# Patient Record
Sex: MALE | Race: WHITE | NOT HISPANIC OR LATINO
[De-identification: names, ages, dates, MRNs, and addresses within clinical notes are randomized per-mention and may not be internally consistent; named-entity substitution may affect disease eponyms.]

---

## 2017-01-03 ENCOUNTER — TRANSCRIPTION ENCOUNTER (OUTPATIENT)
Age: 62
End: 2017-01-03

## 2017-01-06 ENCOUNTER — TRANSCRIPTION ENCOUNTER (OUTPATIENT)
Age: 62
End: 2017-01-06

## 2017-01-11 ENCOUNTER — MEDICATION RENEWAL (OUTPATIENT)
Age: 62
End: 2017-01-11

## 2017-01-19 ENCOUNTER — APPOINTMENT (OUTPATIENT)
Dept: PULMONOLOGY | Facility: CLINIC | Age: 62
End: 2017-01-19

## 2017-01-19 VITALS — HEART RATE: 91 BPM | RESPIRATION RATE: 14 BRPM | OXYGEN SATURATION: 97 %

## 2017-02-28 ENCOUNTER — APPOINTMENT (OUTPATIENT)
Dept: INTERNAL MEDICINE | Facility: CLINIC | Age: 62
End: 2017-02-28

## 2017-02-28 VITALS
HEART RATE: 74 BPM | WEIGHT: 315 LBS | TEMPERATURE: 98.2 F | BODY MASS INDEX: 56.33 KG/M2 | SYSTOLIC BLOOD PRESSURE: 110 MMHG | DIASTOLIC BLOOD PRESSURE: 70 MMHG | OXYGEN SATURATION: 98 %

## 2017-03-13 ENCOUNTER — TRANSCRIPTION ENCOUNTER (OUTPATIENT)
Age: 62
End: 2017-03-13

## 2017-03-30 ENCOUNTER — APPOINTMENT (OUTPATIENT)
Dept: OPHTHALMOLOGY | Facility: CLINIC | Age: 62
End: 2017-03-30

## 2017-04-05 ENCOUNTER — TRANSCRIPTION ENCOUNTER (OUTPATIENT)
Age: 62
End: 2017-04-05

## 2017-04-05 ENCOUNTER — RX RENEWAL (OUTPATIENT)
Age: 62
End: 2017-04-05

## 2017-04-06 ENCOUNTER — OTHER (OUTPATIENT)
Age: 62
End: 2017-04-06

## 2017-04-06 ENCOUNTER — TRANSCRIPTION ENCOUNTER (OUTPATIENT)
Age: 62
End: 2017-04-06

## 2017-04-10 ENCOUNTER — OTHER (OUTPATIENT)
Age: 62
End: 2017-04-10

## 2017-04-10 ENCOUNTER — TRANSCRIPTION ENCOUNTER (OUTPATIENT)
Age: 62
End: 2017-04-10

## 2017-04-19 ENCOUNTER — APPOINTMENT (OUTPATIENT)
Dept: PULMONOLOGY | Facility: CLINIC | Age: 62
End: 2017-04-19

## 2017-04-20 ENCOUNTER — APPOINTMENT (OUTPATIENT)
Dept: PULMONOLOGY | Facility: CLINIC | Age: 62
End: 2017-04-20
Payer: COMMERCIAL

## 2017-04-20 VITALS — OXYGEN SATURATION: 96 %

## 2017-04-20 PROCEDURE — ZZZZZ: CPT

## 2017-04-24 ENCOUNTER — OTHER (OUTPATIENT)
Age: 62
End: 2017-04-24

## 2017-05-02 ENCOUNTER — TRANSCRIPTION ENCOUNTER (OUTPATIENT)
Age: 62
End: 2017-05-02

## 2017-05-30 ENCOUNTER — RX RENEWAL (OUTPATIENT)
Age: 62
End: 2017-05-30

## 2017-06-08 ENCOUNTER — TRANSCRIPTION ENCOUNTER (OUTPATIENT)
Age: 62
End: 2017-06-08

## 2017-06-08 ENCOUNTER — MEDICATION RENEWAL (OUTPATIENT)
Age: 62
End: 2017-06-08

## 2017-06-12 ENCOUNTER — RX RENEWAL (OUTPATIENT)
Age: 62
End: 2017-06-12

## 2017-06-20 ENCOUNTER — APPOINTMENT (OUTPATIENT)
Dept: INTERNAL MEDICINE | Facility: CLINIC | Age: 62
End: 2017-06-20

## 2017-06-20 VITALS
HEART RATE: 82 BPM | SYSTOLIC BLOOD PRESSURE: 130 MMHG | OXYGEN SATURATION: 97 % | BODY MASS INDEX: 47.74 KG/M2 | WEIGHT: 315 LBS | DIASTOLIC BLOOD PRESSURE: 70 MMHG | HEIGHT: 68 IN | TEMPERATURE: 98 F

## 2017-06-23 LAB
ALBUMIN SERPL ELPH-MCNC: 4.1 G/DL
ALP BLD-CCNC: 67 U/L
ALT SERPL-CCNC: 12 U/L
ANION GAP SERPL CALC-SCNC: 16 MMOL/L
AST SERPL-CCNC: 19 U/L
BILIRUB SERPL-MCNC: 0.2 MG/DL
BUN SERPL-MCNC: 20 MG/DL
CALCIUM SERPL-MCNC: 10 MG/DL
CHLORIDE SERPL-SCNC: 104 MMOL/L
CHOLEST SERPL-MCNC: 164 MG/DL
CHOLEST/HDLC SERPL: 3.2 RATIO
CO2 SERPL-SCNC: 23 MMOL/L
CREAT SERPL-MCNC: 1.29 MG/DL
CREAT SPEC-SCNC: 146 MG/DL
GLUCOSE SERPL-MCNC: 114 MG/DL
HBA1C MFR BLD HPLC: 6.3 %
HDLC SERPL-MCNC: 52 MG/DL
LDLC SERPL CALC-MCNC: 78 MG/DL
MICROALBUMIN 24H UR DL<=1MG/L-MCNC: 1.7 MG/DL
MICROALBUMIN/CREAT 24H UR-RTO: 12 MG/G
POTASSIUM SERPL-SCNC: 4.5 MMOL/L
PROT SERPL-MCNC: 6.6 G/DL
SODIUM SERPL-SCNC: 143 MMOL/L
TRIGL SERPL-MCNC: 168 MG/DL

## 2017-06-26 ENCOUNTER — TRANSCRIPTION ENCOUNTER (OUTPATIENT)
Age: 62
End: 2017-06-26

## 2017-06-26 ENCOUNTER — MEDICATION RENEWAL (OUTPATIENT)
Age: 62
End: 2017-06-26

## 2017-07-19 ENCOUNTER — APPOINTMENT (OUTPATIENT)
Dept: INTERNAL MEDICINE | Facility: CLINIC | Age: 62
End: 2017-07-19

## 2017-07-19 VITALS
HEART RATE: 97 BPM | HEIGHT: 67 IN | SYSTOLIC BLOOD PRESSURE: 122 MMHG | DIASTOLIC BLOOD PRESSURE: 84 MMHG | TEMPERATURE: 98.2 F | BODY MASS INDEX: 49.44 KG/M2 | OXYGEN SATURATION: 96 % | WEIGHT: 315 LBS

## 2017-07-20 LAB
25(OH)D3 SERPL-MCNC: 41 NG/ML
BASOPHILS # BLD AUTO: 0.06 K/UL
BASOPHILS NFR BLD AUTO: 0.9 %
EOSINOPHIL # BLD AUTO: 0.44 K/UL
EOSINOPHIL NFR BLD AUTO: 6.6 %
HCT VFR BLD CALC: 42.5 %
HGB BLD-MCNC: 13.3 G/DL
HIV1+2 AB SPEC QL IA.RAPID: NONREACTIVE
IMM GRANULOCYTES NFR BLD AUTO: 0.5 %
LYMPHOCYTES # BLD AUTO: 1.03 K/UL
LYMPHOCYTES NFR BLD AUTO: 15.6 %
MAN DIFF?: NORMAL
MCHC RBC-ENTMCNC: 28.2 PG
MCHC RBC-ENTMCNC: 31.3 GM/DL
MCV RBC AUTO: 90.2 FL
MONOCYTES # BLD AUTO: 0.72 K/UL
MONOCYTES NFR BLD AUTO: 10.9 %
NEUTROPHILS # BLD AUTO: 4.34 K/UL
NEUTROPHILS NFR BLD AUTO: 65.5 %
PLATELET # BLD AUTO: 282 K/UL
RBC # BLD: 4.71 M/UL
RBC # FLD: 15.2 %
WBC # FLD AUTO: 6.62 K/UL

## 2017-08-10 ENCOUNTER — INPATIENT (INPATIENT)
Facility: HOSPITAL | Age: 62
LOS: 4 days | Discharge: HOME | DRG: 394 | End: 2017-08-15
Attending: SURGERY | Admitting: SURGERY
Payer: COMMERCIAL

## 2017-08-10 VITALS
WEIGHT: 315 LBS | SYSTOLIC BLOOD PRESSURE: 153 MMHG | RESPIRATION RATE: 18 BRPM | TEMPERATURE: 98 F | OXYGEN SATURATION: 96 % | HEART RATE: 119 BPM | HEIGHT: 68 IN | DIASTOLIC BLOOD PRESSURE: 73 MMHG

## 2017-08-10 DIAGNOSIS — Z90.49 ACQUIRED ABSENCE OF OTHER SPECIFIED PARTS OF DIGESTIVE TRACT: Chronic | ICD-10-CM

## 2017-08-10 LAB
ALBUMIN SERPL ELPH-MCNC: 3.4 G/DL — SIGNIFICANT CHANGE UP (ref 3.3–5)
ALP SERPL-CCNC: 69 U/L — SIGNIFICANT CHANGE UP (ref 40–120)
ALT FLD-CCNC: 20 U/L — SIGNIFICANT CHANGE UP (ref 10–45)
ANION GAP SERPL CALC-SCNC: 23 MMOL/L — HIGH (ref 5–17)
APPEARANCE UR: CLEAR — SIGNIFICANT CHANGE UP
AST SERPL-CCNC: 32 U/L — SIGNIFICANT CHANGE UP (ref 10–40)
BASOPHILS NFR BLD AUTO: 2 % — SIGNIFICANT CHANGE UP (ref 0–2)
BILIRUB SERPL-MCNC: 0.6 MG/DL — SIGNIFICANT CHANGE UP (ref 0.2–1.2)
BILIRUB UR-MCNC: (no result)
BUN SERPL-MCNC: 27 MG/DL — HIGH (ref 7–23)
CALCIUM SERPL-MCNC: 8.8 MG/DL — SIGNIFICANT CHANGE UP (ref 8.4–10.5)
CHLORIDE SERPL-SCNC: 89 MMOL/L — LOW (ref 96–108)
CO2 SERPL-SCNC: 22 MMOL/L — SIGNIFICANT CHANGE UP (ref 22–31)
COLOR SPEC: YELLOW — SIGNIFICANT CHANGE UP
CREAT SERPL-MCNC: 1.2 MG/DL — SIGNIFICANT CHANGE UP (ref 0.5–1.3)
DIFF PNL FLD: NEGATIVE — SIGNIFICANT CHANGE UP
EXTRA SST TUBE: SIGNIFICANT CHANGE UP
GLUCOSE SERPL-MCNC: 165 MG/DL — HIGH (ref 70–99)
GLUCOSE UR QL: NEGATIVE — SIGNIFICANT CHANGE UP
HCT VFR BLD CALC: 46.9 % — SIGNIFICANT CHANGE UP (ref 39–50)
HGB BLD-MCNC: 15.7 G/DL — SIGNIFICANT CHANGE UP (ref 13–17)
INR BLD: 1.28 — HIGH (ref 0.88–1.16)
KETONES UR-MCNC: 15 MG/DL
LACTATE SERPL-SCNC: 1.5 MMOL/L — SIGNIFICANT CHANGE UP (ref 0.5–2)
LACTATE SERPL-SCNC: 2.2 MMOL/L — HIGH (ref 0.5–2)
LEUKOCYTE ESTERASE UR-ACNC: NEGATIVE — SIGNIFICANT CHANGE UP
LYMPHOCYTES # BLD AUTO: 11 % — LOW (ref 13–44)
MCHC RBC-ENTMCNC: 28.1 PG — SIGNIFICANT CHANGE UP (ref 27–34)
MCHC RBC-ENTMCNC: 33.5 G/DL — SIGNIFICANT CHANGE UP (ref 32–36)
MCV RBC AUTO: 84.1 FL — SIGNIFICANT CHANGE UP (ref 80–100)
MONOCYTES NFR BLD AUTO: 24 % — HIGH (ref 2–14)
NEUTROPHILS NFR BLD AUTO: 50 % — SIGNIFICANT CHANGE UP (ref 43–77)
NITRITE UR-MCNC: NEGATIVE — SIGNIFICANT CHANGE UP
PH UR: 5 — SIGNIFICANT CHANGE UP (ref 5–8)
PLATELET # BLD AUTO: 357 K/UL — SIGNIFICANT CHANGE UP (ref 150–400)
POTASSIUM SERPL-MCNC: 4.2 MMOL/L — SIGNIFICANT CHANGE UP (ref 3.5–5.3)
POTASSIUM SERPL-SCNC: 4.2 MMOL/L — SIGNIFICANT CHANGE UP (ref 3.5–5.3)
PROT SERPL-MCNC: 7.2 G/DL — SIGNIFICANT CHANGE UP (ref 6–8.3)
PROT UR-MCNC: (no result) MG/DL
PROTHROM AB SERPL-ACNC: 14.3 SEC — HIGH (ref 9.8–12.7)
RBC # BLD: 5.58 M/UL — SIGNIFICANT CHANGE UP (ref 4.2–5.8)
RBC # FLD: 14.1 % — SIGNIFICANT CHANGE UP (ref 10.3–16.9)
SODIUM SERPL-SCNC: 134 MMOL/L — LOW (ref 135–145)
SP GR SPEC: 1.02 — SIGNIFICANT CHANGE UP (ref 1–1.03)
UROBILINOGEN FLD QL: 0.2 E.U./DL — SIGNIFICANT CHANGE UP
WBC # BLD: 8.2 K/UL — SIGNIFICANT CHANGE UP (ref 3.8–10.5)
WBC # FLD AUTO: 8.2 K/UL — SIGNIFICANT CHANGE UP (ref 3.8–10.5)

## 2017-08-10 PROCEDURE — 93010 ELECTROCARDIOGRAM REPORT: CPT

## 2017-08-10 PROCEDURE — 71010: CPT | Mod: 26

## 2017-08-10 PROCEDURE — 74177 CT ABD & PELVIS W/CONTRAST: CPT | Mod: 26

## 2017-08-10 PROCEDURE — 99285 EMERGENCY DEPT VISIT HI MDM: CPT | Mod: 25

## 2017-08-10 RX ORDER — MORPHINE SULFATE 50 MG/1
4 CAPSULE, EXTENDED RELEASE ORAL ONCE
Qty: 0 | Refills: 0 | Status: DISCONTINUED | OUTPATIENT
Start: 2017-08-10 | End: 2017-08-10

## 2017-08-10 RX ORDER — DEXTROSE 50 % IN WATER 50 %
25 SYRINGE (ML) INTRAVENOUS ONCE
Qty: 0 | Refills: 0 | Status: DISCONTINUED | OUTPATIENT
Start: 2017-08-10 | End: 2017-08-12

## 2017-08-10 RX ORDER — SODIUM CHLORIDE 9 MG/ML
1000 INJECTION INTRAMUSCULAR; INTRAVENOUS; SUBCUTANEOUS ONCE
Qty: 0 | Refills: 0 | Status: COMPLETED | OUTPATIENT
Start: 2017-08-10 | End: 2017-08-10

## 2017-08-10 RX ORDER — HEPARIN SODIUM 5000 [USP'U]/ML
7500 INJECTION INTRAVENOUS; SUBCUTANEOUS EVERY 8 HOURS
Qty: 0 | Refills: 0 | Status: DISCONTINUED | OUTPATIENT
Start: 2017-08-10 | End: 2017-08-15

## 2017-08-10 RX ORDER — CIPROFLOXACIN LACTATE 400MG/40ML
400 VIAL (ML) INTRAVENOUS ONCE
Qty: 0 | Refills: 0 | Status: COMPLETED | OUTPATIENT
Start: 2017-08-10 | End: 2017-08-10

## 2017-08-10 RX ORDER — DEXTROSE 50 % IN WATER 50 %
25 SYRINGE (ML) INTRAVENOUS ONCE
Qty: 0 | Refills: 0 | Status: DISCONTINUED | OUTPATIENT
Start: 2017-08-10 | End: 2017-08-15

## 2017-08-10 RX ORDER — ONDANSETRON 8 MG/1
4 TABLET, FILM COATED ORAL ONCE
Qty: 0 | Refills: 0 | Status: COMPLETED | OUTPATIENT
Start: 2017-08-10 | End: 2017-08-10

## 2017-08-10 RX ORDER — INSULIN LISPRO 100/ML
VIAL (ML) SUBCUTANEOUS EVERY 6 HOURS
Qty: 0 | Refills: 0 | Status: DISCONTINUED | OUTPATIENT
Start: 2017-08-10 | End: 2017-08-15

## 2017-08-10 RX ORDER — DEXTROSE 50 % IN WATER 50 %
12.5 SYRINGE (ML) INTRAVENOUS ONCE
Qty: 0 | Refills: 0 | Status: DISCONTINUED | OUTPATIENT
Start: 2017-08-10 | End: 2017-08-12

## 2017-08-10 RX ORDER — METRONIDAZOLE 500 MG
500 TABLET ORAL ONCE
Qty: 0 | Refills: 0 | Status: COMPLETED | OUTPATIENT
Start: 2017-08-10 | End: 2017-08-10

## 2017-08-10 RX ORDER — FAMOTIDINE 10 MG/ML
20 INJECTION INTRAVENOUS ONCE
Qty: 0 | Refills: 0 | Status: COMPLETED | OUTPATIENT
Start: 2017-08-10 | End: 2017-08-10

## 2017-08-10 RX ORDER — SODIUM CHLORIDE 9 MG/ML
1000 INJECTION INTRAMUSCULAR; INTRAVENOUS; SUBCUTANEOUS
Qty: 0 | Refills: 0 | Status: DISCONTINUED | OUTPATIENT
Start: 2017-08-10 | End: 2017-08-11

## 2017-08-10 RX ORDER — IOHEXOL 300 MG/ML
50 INJECTION, SOLUTION INTRAVENOUS ONCE
Qty: 0 | Refills: 0 | Status: COMPLETED | OUTPATIENT
Start: 2017-08-10 | End: 2017-08-10

## 2017-08-10 RX ADMIN — MORPHINE SULFATE 4 MILLIGRAM(S): 50 CAPSULE, EXTENDED RELEASE ORAL at 19:41

## 2017-08-10 RX ADMIN — IOHEXOL 50 MILLILITER(S): 300 INJECTION, SOLUTION INTRAVENOUS at 17:24

## 2017-08-10 RX ADMIN — Medication 200 MILLIGRAM(S): at 18:45

## 2017-08-10 RX ADMIN — MORPHINE SULFATE 4 MILLIGRAM(S): 50 CAPSULE, EXTENDED RELEASE ORAL at 20:31

## 2017-08-10 RX ADMIN — MORPHINE SULFATE 4 MILLIGRAM(S): 50 CAPSULE, EXTENDED RELEASE ORAL at 20:29

## 2017-08-10 RX ADMIN — SODIUM CHLORIDE 1000 MILLILITER(S): 9 INJECTION INTRAMUSCULAR; INTRAVENOUS; SUBCUTANEOUS at 17:46

## 2017-08-10 RX ADMIN — ONDANSETRON 4 MILLIGRAM(S): 8 TABLET, FILM COATED ORAL at 17:24

## 2017-08-10 RX ADMIN — FAMOTIDINE 20 MILLIGRAM(S): 10 INJECTION INTRAVENOUS at 17:24

## 2017-08-10 RX ADMIN — MORPHINE SULFATE 4 MILLIGRAM(S): 50 CAPSULE, EXTENDED RELEASE ORAL at 17:24

## 2017-08-10 RX ADMIN — Medication 100 MILLIGRAM(S): at 17:42

## 2017-08-10 RX ADMIN — SODIUM CHLORIDE 2000 MILLILITER(S): 9 INJECTION INTRAMUSCULAR; INTRAVENOUS; SUBCUTANEOUS at 20:25

## 2017-08-10 NOTE — ED ADULT NURSE REASSESSMENT NOTE - NS ED NURSE REASSESS COMMENT FT1
NG tube placed by surgery team.  Connected to medium continuous suctioning.  800mL of brown gastric content in suctioning canister noted.

## 2017-08-10 NOTE — ED ADULT NURSE NOTE - CHPI ED SYMPTOMS NEG
no hematuria/no fever/no diarrhea/no abdominal distension/no chills/no burning urination/no blood in stool/no dysuria

## 2017-08-10 NOTE — ED ADULT NURSE NOTE - OTHER COMPLAINTS
pt c.o abd pain since Saturday with n/v. pt diaphoretic in triage, tachycardic. denies cp/palpitations. no sob. denies fever/chills. ekg in progress.

## 2017-08-10 NOTE — H&P ADULT - HISTORY OF PRESENT ILLNESS
62M Hx of DM, HTN, MO, and a surgical  hx of a partial sigmoidectomy, appendectomy, and cholecystectomy in 2010 at [facility] 2/2 an enlarged sigmoidal diverticulum (according to the pt) presents to the [facility] ED with a 5 day hx of 'burning' mid abdominal pain that at times has migrated. Today he developed nausea and vomiting and reports vomiting gastric content and was NBNB. He notes that his episode of emesis improved his abd pain.  Last BM or flatus was 2 days prior. He has a known hx of a ventral hernia in his LLQ but he says that it never caused him any pain or any obstructive symptoms. Currently he continues to c/o burning mid abdominal pain that ebs and flows. He c/o chills but denies fevers, chest pain, sob, dyspnea, dysuria.     PMH: HTN, DM, MO  Meds: 12u Novolog premeal, 33u Levemir QHS, Metformin 500mg BID, quinapril 20mg qd  All: penicillin --> anaphylaxis  PSH: as stated above  Social: Never smoker, no ETOH, denies IVDA

## 2017-08-10 NOTE — H&P ADULT - ASSESSMENT
62M with MO, DM, HTN and known large ventral hernia here with an SBO 2/2 an incarcerated large ventral hernia.  NGT placed at bedside in ED with immediate return of 1L of succus.  - Will admit to surgery, tele bed  - Trial of nonoperative management   - NPO/IVF/NGT to LIWS for decompression  - Serial abdominal exams  - SQH/SCDs/OOBA  - No current indication for abx  - CXR to confirm placement  - Will hold narcotics and antiemetics  - Discussed with chief and attending on call 62M with MO, DM, HTN and known large ventral hernia here with an SBO 2/2 an incarcerated large ventral hernia.  NGT placed at bedside in ED with immediate return of 1L of succus.  - Will admit to surgery, tele bed  - Trial of nonoperative management   - NPO/IVF/NGT to LIWS for decompression  - Serial abdominal exams  - SQH/SCDs/OOBA  - ISS  - No current indication for abx  - CXR to confirm placement  - Will hold narcotics and antiemetics  - Discussed with chief and attending on call

## 2017-08-10 NOTE — H&P ADULT - NSHPLABSRESULTS_GEN_ALL_CORE
15.7   8.2   )-----------( 357      ( 10 Aug 2017 17:04 )             46.9   08-10    134<L>  |  89<L>  |  27<H>  ----------------------------<  165<H>  4.2   |  22  |  1.20    Ca    8.8      10 Aug 2017 17:04    TPro  7.2  /  Alb  3.4  /  TBili  0.6  /  DBili  x   /  AST  32  /  ALT  20  /  AlkPhos  69  08-10    Lactate: 2.2 --> 1.5    INTERPRETATION:  CT of the ABDOMEN and PELVIS     INDICATION: Abdominal pain. Fever. Tachycardia.    TECHNIQUE: CT of the abdomen and pelvis was performed using oral and   intravenous contrast. Axial, sagittal and coronal images were produced   and reviewed.    PRIOR STUDIES: Right upper quadrant ultrasound from 3/1/2016.    FINDINGS: Images of the lower chest demonstrate small fat-containing left   Bochdalek hernia. There is linear atelectasis in each lower lobe.    The liver is normal in appearance.  Status post cholecystectomy.  The   pancreas, spleen, and adrenal glands adrenal glands are unremarkable.   Right kidney unremarkable. Two left renal cysts, largest 1.2 cm exophytic   cyst lower pole.    No abdominal aortic aneurysm is seen. Small calcified plaque aorta. No   lymphadenopathy is seen.     A large ventral hernia is present in the left lower quadrant. The hernia   is a large that it is incompletely imaged on this exam there is a   high-grade small bowel obstruction with transition point within this   hernia sac. The stomach and multiple small bowel are markedly dilated.   Transition point probably within mid ileum. Collapsed distal ileum and   right colon also present within hernia sac. The remainder of the colon is   collapsed. There are colonic diverticula. Status post partial colonic   resection with anastomosis in sigmoid colon. No free air is seen. There   are inflammatory changes within the subcutaneous fat anterior to the   hernia.    There are also fat-containing supraumbilical ventral hernias.    Images of the pelvis demonstrate the prostate and seminal vesicles to be   normal in appearance.   No filling defects are seen in the urinary   bladder.     Evaluation of the osseous structures demonstrates degenerative changes of   the spine.      IMPRESSION:  There is a high-grade small bowel obstruction with   transition point in a large hernia in the left lower quadrant.

## 2017-08-10 NOTE — H&P ADULT - NSHPPHYSICALEXAM_GEN_ALL_CORE
ICU Vital Signs Last 24 Hrs  T(C): 36.7 (10 Aug 2017 22:58), Max: 36.8 (10 Aug 2017 16:13)  T(F): 98.1 (10 Aug 2017 22:58), Max: 98.3 (10 Aug 2017 16:13)  HR: 97 (10 Aug 2017 22:58) (97 - 119)  BP: 159/70 (10 Aug 2017 22:58) (133/75 - 173/83)  BP(mean): --  ABP: --  ABP(mean): --  RR: 22 (10 Aug 2017 22:58) (16 - 22)  SpO2: 95% (10 Aug 2017 22:58) (95% - 99%)      Gen: Morbidly obese male in NAD  CV: Tachycardic, S1S2   Resp: CTAB  Abd: Protuberant abdomen with faint bowel sounds, soft, ttp mid abdomen without rebound/guarding/rigidity  Difficult to assess distention. Very large pannus with a large LLQ ventral hernia with palpable contents in the pannus.  No overlying skin changes, no deep palpation.  Unable to reduce hernia.  Ext: WWP, minimal peripheral edema ICU Vital Signs Last 24 Hrs  T(C): 36.7 (10 Aug 2017 22:58), Max: 36.8 (10 Aug 2017 16:13)  T(F): 98.1 (10 Aug 2017 22:58), Max: 98.3 (10 Aug 2017 16:13)  HR: 97 (10 Aug 2017 22:58) (97 - 119)  BP: 159/70 (10 Aug 2017 22:58) (133/75 - 173/83)  BP(mean): --  ABP: --  ABP(mean): --  RR: 22 (10 Aug 2017 22:58) (16 - 22)  SpO2: 95% (10 Aug 2017 22:58) (95% - 99%)      Gen: Morbidly obese male in NAD  CV: Tachycardic, S1S2   Resp: CTAB  Abd: Protuberant abdomen with faint bowel sounds, soft, ttp mid abdomen without rebound/guarding/rigidity  Difficult to assess distention. Very large pannus with a large LLQ ventral hernia with palpable contents in the pannus.  No overlying erythema/induration, no tenderness to deep palpation.  Unable to reduce hernia.  Ext: WWP, minimal peripheral edema

## 2017-08-10 NOTE — ED PROVIDER NOTE - OBJECTIVE STATEMENT
61 y/o M y/DM, HTN, obsesity, s/p lap nati, ?CAD_____, p/w intermittant abd pain lower vs epigastric 61 y/o M y/DM, HTN, obsesity, s/p lap nati, ?CAD, p/w intermittant abd pain lower vs epigastric, "moving around" per patient, persistent for past 5 days, now stronger than previous days, passing gas, having normal BMs, + nausea, no vomiting. + night sweats, no documented fever. No travel/sick contacts. Denies melena stool or BRBPR.

## 2017-08-10 NOTE — ED ADULT NURSE NOTE - OBJECTIVE STATEMENT
Pt reports generalized abdominal pain 6/10 x 2 days with nausea and vomiting. pt denies fever/chills, chest pain 0/10, shortness of breathe, dizziness, palpitations. Pt sinus tachycardia on continuous cardiac monitor. Pt denies dysuria, diarrhea.

## 2017-08-10 NOTE — ED PROVIDER NOTE - PHYSICAL EXAMINATION
VITAL SIGNS: I have reviewed nursing notes and confirm.  CONSTITUTIONAL: Well-developed obese man diaphoretic and sitting up in stretcher, no air hunger, speaking clearly in complete sentences, approp affect A&Ox3; in no acute distress.  SKIN: Agree with RN documentation regarding decubitus evaluation. Remainder of skin exam is warm and + diaphoretic, no acute rash.  HEAD: Normocephalic; atraumatic.  EYES: PERRL, EOM intact; conjunctiva and sclera clear.  ENT: No nasal discharge; airway clear.  NECK: Supple; non tender.  CARD: S1, S2 normal; no murmurs, gallops, or rubs. + tachycardic regular rhythm low 100s  RESP: No wheezes, rales or rhonchi, CTA b/l, good excursion  ABD: Soft, + distended/obese abdomen + TTP all quadrants w/out rigidity or guarding, + hyperactive BS  EXT: Normal ROM. No cyanosis, + mild peripheral edema to ankles b/l w/out erythema or TTP, distal pulses intact  LYMPH: No acute cervical adenopathy.  NEURO: Alert, oriented. Grossly unremarkable.  PSYCH: Cooperative, appropriate.

## 2017-08-10 NOTE — ED PROVIDER NOTE - MEDICAL DECISION MAKING DETAILS
abx and fluids given for ?sepsis with elevated lactate, + SBO on CT, surgery bedside placing NGT, pending repeat lactate, admitting to surgical stepdown. pt comfortable in ED, pain well controlled and vitals stable. No vomiting in ED.

## 2017-08-10 NOTE — ED CLERICAL - NS ED CLERK NOTE PRE-ARRIVAL INFORMATION; ADDITIONAL PRE-ARRIVAL INFORMATION
63 Y/O M HANANE THOMAS BEING SENT IN BY DR COLON FROM MEDICAL CLINIC FOR AB PAIN X4 DAYS VOMITING COFFEE GROUNDS DM ULCERTIVE COLITIS

## 2017-08-10 NOTE — ED ADULT TRIAGE NOTE - OTHER COMPLAINTS
pt c.o abd pain since Saturday with n/v. pt diaphoretic in triage, tachycardic. denies cp/palpitations. denies fever/chills. ekg in progress. pt c.o abd pain since Saturday with n/v. pt diaphoretic in triage, tachycardic. denies cp/palpitations. no sob. denies fever/chills. ekg in progress.

## 2017-08-11 LAB
ANION GAP SERPL CALC-SCNC: 12 MMOL/L — SIGNIFICANT CHANGE UP (ref 5–17)
APTT BLD: 26.9 SEC — LOW (ref 27.5–37.4)
BUN SERPL-MCNC: 19 MG/DL — SIGNIFICANT CHANGE UP (ref 7–23)
CALCIUM SERPL-MCNC: 7.5 MG/DL — LOW (ref 8.4–10.5)
CHLORIDE SERPL-SCNC: 100 MMOL/L — SIGNIFICANT CHANGE UP (ref 96–108)
CO2 SERPL-SCNC: 25 MMOL/L — SIGNIFICANT CHANGE UP (ref 22–31)
CREAT SERPL-MCNC: 1.1 MG/DL — SIGNIFICANT CHANGE UP (ref 0.5–1.3)
GLUCOSE SERPL-MCNC: 141 MG/DL — HIGH (ref 70–99)
HBA1C BLD-MCNC: 6 % — HIGH (ref 4–5.6)
HCT VFR BLD CALC: 40.7 % — SIGNIFICANT CHANGE UP (ref 39–50)
HGB BLD-MCNC: 13.5 G/DL — SIGNIFICANT CHANGE UP (ref 13–17)
INR BLD: 1.26 — HIGH (ref 0.88–1.16)
MAGNESIUM SERPL-MCNC: 1.9 MG/DL — SIGNIFICANT CHANGE UP (ref 1.6–2.6)
MCHC RBC-ENTMCNC: 28.5 PG — SIGNIFICANT CHANGE UP (ref 27–34)
MCHC RBC-ENTMCNC: 33.2 G/DL — SIGNIFICANT CHANGE UP (ref 32–36)
MCV RBC AUTO: 85.9 FL — SIGNIFICANT CHANGE UP (ref 80–100)
PHOSPHATE SERPL-MCNC: 3 MG/DL — SIGNIFICANT CHANGE UP (ref 2.5–4.5)
PLATELET # BLD AUTO: 274 K/UL — SIGNIFICANT CHANGE UP (ref 150–400)
POTASSIUM SERPL-MCNC: 3.5 MMOL/L — SIGNIFICANT CHANGE UP (ref 3.5–5.3)
POTASSIUM SERPL-SCNC: 3.5 MMOL/L — SIGNIFICANT CHANGE UP (ref 3.5–5.3)
PROTHROM AB SERPL-ACNC: 14.1 SEC — HIGH (ref 9.8–12.7)
RBC # BLD: 4.74 M/UL — SIGNIFICANT CHANGE UP (ref 4.2–5.8)
RBC # FLD: 14.3 % — SIGNIFICANT CHANGE UP (ref 10.3–16.9)
SODIUM SERPL-SCNC: 137 MMOL/L — SIGNIFICANT CHANGE UP (ref 135–145)
WBC # BLD: 6.2 K/UL — SIGNIFICANT CHANGE UP (ref 3.8–10.5)
WBC # FLD AUTO: 6.2 K/UL — SIGNIFICANT CHANGE UP (ref 3.8–10.5)

## 2017-08-11 PROCEDURE — 71010: CPT | Mod: 26

## 2017-08-11 RX ORDER — SODIUM CHLORIDE 9 MG/ML
1000 INJECTION INTRAMUSCULAR; INTRAVENOUS; SUBCUTANEOUS
Qty: 0 | Refills: 0 | Status: DISCONTINUED | OUTPATIENT
Start: 2017-08-11 | End: 2017-08-14

## 2017-08-11 RX ORDER — TETRACAINE/BENZOCAINE/BUTAMBEN 2%-14%-2%
1 OINTMENT (GRAM) TOPICAL EVERY 6 HOURS
Qty: 0 | Refills: 0 | Status: DISCONTINUED | OUTPATIENT
Start: 2017-08-11 | End: 2017-08-15

## 2017-08-11 RX ADMIN — SODIUM CHLORIDE 150 MILLILITER(S): 9 INJECTION INTRAMUSCULAR; INTRAVENOUS; SUBCUTANEOUS at 07:59

## 2017-08-11 RX ADMIN — HEPARIN SODIUM 7500 UNIT(S): 5000 INJECTION INTRAVENOUS; SUBCUTANEOUS at 16:59

## 2017-08-11 RX ADMIN — Medication 1 SPRAY(S): at 17:16

## 2017-08-11 RX ADMIN — SODIUM CHLORIDE 200 MILLILITER(S): 9 INJECTION INTRAMUSCULAR; INTRAVENOUS; SUBCUTANEOUS at 04:12

## 2017-08-11 RX ADMIN — SODIUM CHLORIDE 200 MILLILITER(S): 9 INJECTION INTRAMUSCULAR; INTRAVENOUS; SUBCUTANEOUS at 00:06

## 2017-08-11 RX ADMIN — Medication 1 SPRAY(S): at 12:34

## 2017-08-11 RX ADMIN — HEPARIN SODIUM 7500 UNIT(S): 5000 INJECTION INTRAVENOUS; SUBCUTANEOUS at 00:06

## 2017-08-11 RX ADMIN — HEPARIN SODIUM 7500 UNIT(S): 5000 INJECTION INTRAVENOUS; SUBCUTANEOUS at 07:55

## 2017-08-11 NOTE — PROGRESS NOTE ADULT - SUBJECTIVE AND OBJECTIVE BOX
O/N: Admitted to surgery with a large obstructing incarcerated ventral hernia with no peritoneal signs.  Lactate cleared (2.2-->1.5) with IVF. Abdominal pain improved with NGT decompression.  Will attempt trial of nonoperative management with adequate NGT decompression and IVF. O/N: Admitted to surgery with a large obstructing incarcerated ventral hernia with no peritoneal signs.  Lactate cleared (2.2-->1.5) with IVF. Abdominal pain improved with NGT decompression.  Will attempt trial of nonoperative management with adequate NGT decompression and IVF.     SUBJECTIVE:  Flatus: [ ] YES [ x] NO             Bowel Movement: [ ] YES [ x] NO  Pain (0-10):            Pain Control Adequate: [x ] YES [ ] NO  Nausea: [ ] YES [ x] NO            Vomiting: [ ] YES [x ] NO  Diarrhea: [ ] YES [ x] NO         Constipation: [ ] YES [ x] NO     Chest Pain: [ ] YES [x ] NO    SOB:  [ ] YES [ x] NO    MEDICATIONS  (STANDING):  heparin  Injectable 7500 Unit(s) SubCutaneous every 8 hours  insulin lispro (HumaLOG) corrective regimen sliding scale   SubCutaneous every 6 hours  dextrose 50% Injectable 12.5 Gram(s) IV Push once  dextrose 50% Injectable 25 Gram(s) IV Push once  dextrose 50% Injectable 25 Gram(s) IV Push once  sodium chloride 0.9%. 1000 milliLiter(s) (150 mL/Hr) IV Continuous <Continuous>    MEDICATIONS  (PRN):      Vital Signs Last 24 Hrs  T(C): 36.9 (10 Aug 2017 23:32), Max: 36.9 (10 Aug 2017 23:32)  T(F): 98.5 (10 Aug 2017 23:32), Max: 98.5 (10 Aug 2017 23:32)  HR: 87 (11 Aug 2017 06:42) (84 - 119)  BP: 140/60 (11 Aug 2017 06:42) (133/75 - 173/83)  BP(mean): 86 (11 Aug 2017 06:42) (86 - 99)  RR: 18 (11 Aug 2017 06:42) (16 - 22)  SpO2: 93% (11 Aug 2017 06:42) (93% - 99%)    PHYSICAL EXAM:      Constitutional: A&Ox3    Respiratory: non labored breathing, no respiratory distress    Cardiovascular: NSR, RRR    Gastrointestinal: soft, non distended, large ventral hernia                 Incision:    Genitourinary: voiding     Extremities: (-) edema                  I&O's Detail    10 Aug 2017 07:01  -  11 Aug 2017 07:00  --------------------------------------------------------  IN:    sodium chloride 0.9%: 1200 mL  Total IN: 1200 mL    OUT:    Nasoenteral Tube: 900 mL    Voided: 400 mL  Total OUT: 1300 mL    Total NET: -100 mL          LABS:                        13.5   6.2   )-----------( 274      ( 11 Aug 2017 06:07 )             40.7     08-11    137  |  100  |  19  ----------------------------<  141<H>  3.5   |  25  |  1.10    Ca    7.5<L>      11 Aug 2017 06:07  Phos  3.0     08-11  Mg     1.9     08-11    TPro  7.2  /  Alb  3.4  /  TBili  0.6  /  DBili  x   /  AST  32  /  ALT  20  /  AlkPhos  69  08-10    PT/INR - ( 11 Aug 2017 06:07 )   PT: 14.1 sec;   INR: 1.26          PTT - ( 11 Aug 2017 06:07 )  PTT:26.9 sec  Urinalysis Basic - ( 10 Aug 2017 20:41 )    Color: x / Appearance: x / SG: x / pH: x  Gluc: x / Ketone: x  / Bili: x / Urobili: x   Blood: x / Protein: x / Nitrite: x   Leuk Esterase: x / RBC: < 5 /HPF / WBC < 5 /HPF   Sq Epi: x / Non Sq Epi: Few /HPF / Bacteria: Present /HPF        RADIOLOGY & ADDITIONAL STUDIES:

## 2017-08-11 NOTE — PROGRESS NOTE ADULT - ATTENDING COMMENTS
Patient seen and examined at bedside.  Reports +flatus around 4PM.  Abdomen obese, soft, NT.  Labs, CT reviewed.  Impression: 62M with small bowel obstruction within large ventral/incisional hernia.  Plan: NPO, NGT, IV fluids  Discussed risks vs. benefits of surgery with patient.  Due to significant loss of domain and current BMI, surgery is likely to have high rate of failure.  However, if patient fails to improve clinically will require operative intervention.

## 2017-08-11 NOTE — PROGRESS NOTE ADULT - ASSESSMENT
- Will admit to surgery, tele bed  - Trial of nonoperative management   - NPO/IVF/NGT to LIWS for decompression  - Serial abdominal exams  - SQH/SCDs/OOBA  - ISS  - No current indication for abx  - CXR to confirm placement  - Will hold narcotics and antiemetics 63YO M w/ SBO 2/2 large ventral hernia   Will admit to surgery, tele bed  - Trial of nonoperative management   - NPO/IVF/NGT to LIWS for decompression  - Serial abdominal exams  - SQH/SCDs/OOBA  - ISS  - No current indication for abx  - CXR to confirm placement  - Will hold narcotics and antiemetics

## 2017-08-12 LAB
ANION GAP SERPL CALC-SCNC: 14 MMOL/L — SIGNIFICANT CHANGE UP (ref 5–17)
BUN SERPL-MCNC: 13 MG/DL — SIGNIFICANT CHANGE UP (ref 7–23)
CALCIUM SERPL-MCNC: 7.6 MG/DL — LOW (ref 8.4–10.5)
CHLORIDE SERPL-SCNC: 103 MMOL/L — SIGNIFICANT CHANGE UP (ref 96–108)
CO2 SERPL-SCNC: 21 MMOL/L — LOW (ref 22–31)
CREAT SERPL-MCNC: 1 MG/DL — SIGNIFICANT CHANGE UP (ref 0.5–1.3)
CULTURE RESULTS: NO GROWTH — SIGNIFICANT CHANGE UP
GLUCOSE SERPL-MCNC: 136 MG/DL — HIGH (ref 70–99)
HCT VFR BLD CALC: 40.6 % — SIGNIFICANT CHANGE UP (ref 39–50)
HGB BLD-MCNC: 13.3 G/DL — SIGNIFICANT CHANGE UP (ref 13–17)
MAGNESIUM SERPL-MCNC: 2.2 MG/DL — SIGNIFICANT CHANGE UP (ref 1.6–2.6)
MCHC RBC-ENTMCNC: 28.5 PG — SIGNIFICANT CHANGE UP (ref 27–34)
MCHC RBC-ENTMCNC: 32.8 G/DL — SIGNIFICANT CHANGE UP (ref 32–36)
MCV RBC AUTO: 87.1 FL — SIGNIFICANT CHANGE UP (ref 80–100)
PHOSPHATE SERPL-MCNC: 2.2 MG/DL — LOW (ref 2.5–4.5)
PLATELET # BLD AUTO: 287 K/UL — SIGNIFICANT CHANGE UP (ref 150–400)
POTASSIUM SERPL-MCNC: 3.6 MMOL/L — SIGNIFICANT CHANGE UP (ref 3.5–5.3)
POTASSIUM SERPL-SCNC: 3.6 MMOL/L — SIGNIFICANT CHANGE UP (ref 3.5–5.3)
RBC # BLD: 4.66 M/UL — SIGNIFICANT CHANGE UP (ref 4.2–5.8)
RBC # FLD: 14.6 % — SIGNIFICANT CHANGE UP (ref 10.3–16.9)
SODIUM SERPL-SCNC: 138 MMOL/L — SIGNIFICANT CHANGE UP (ref 135–145)
SPECIMEN SOURCE: SIGNIFICANT CHANGE UP
WBC # BLD: 7.8 K/UL — SIGNIFICANT CHANGE UP (ref 3.8–10.5)
WBC # FLD AUTO: 7.8 K/UL — SIGNIFICANT CHANGE UP (ref 3.8–10.5)

## 2017-08-12 RX ORDER — POTASSIUM PHOSPHATE, MONOBASIC POTASSIUM PHOSPHATE, DIBASIC 236; 224 MG/ML; MG/ML
15 INJECTION, SOLUTION INTRAVENOUS ONCE
Qty: 0 | Refills: 0 | Status: COMPLETED | OUTPATIENT
Start: 2017-08-12 | End: 2017-08-12

## 2017-08-12 RX ADMIN — POTASSIUM PHOSPHATE, MONOBASIC POTASSIUM PHOSPHATE, DIBASIC 62.5 MILLIMOLE(S): 236; 224 INJECTION, SOLUTION INTRAVENOUS at 17:00

## 2017-08-12 RX ADMIN — Medication 1 SPRAY(S): at 06:55

## 2017-08-12 RX ADMIN — HEPARIN SODIUM 7500 UNIT(S): 5000 INJECTION INTRAVENOUS; SUBCUTANEOUS at 23:27

## 2017-08-12 RX ADMIN — HEPARIN SODIUM 7500 UNIT(S): 5000 INJECTION INTRAVENOUS; SUBCUTANEOUS at 00:28

## 2017-08-12 RX ADMIN — HEPARIN SODIUM 7500 UNIT(S): 5000 INJECTION INTRAVENOUS; SUBCUTANEOUS at 17:03

## 2017-08-12 RX ADMIN — HEPARIN SODIUM 7500 UNIT(S): 5000 INJECTION INTRAVENOUS; SUBCUTANEOUS at 07:53

## 2017-08-12 RX ADMIN — Medication 1 SPRAY(S): at 00:28

## 2017-08-12 NOTE — DIETITIAN INITIAL EVALUATION ADULT. - ENERGY NEEDS
IBW used as pt is currently greater than 120% ideal body weight.   Increased needs secondary to current medical state  Energy: 1960-2240kcal (28-32cal/kg IBW)

## 2017-08-12 NOTE — DIETITIAN INITIAL EVALUATION ADULT. - OTHER INFO
Pt admitted with SBO secondary to incarcerated large ventral hernia; trial of non-operative management with NGT decompression.  Pt remains NPO with NGT to [facility].  Noted (+) flatus.  Pt is a poor historian at present.  No family at bedside.  Nutrition history unable to be obtained.  Pt with no recent complaints of GI distress or pain.  NFKA per EMR documentation.  Skin: intact.

## 2017-08-12 NOTE — CHART NOTE - NSCHARTNOTEFT_GEN_A_CORE
Upon Nutritional Assessment by the Registered Dietitian your patient was determined to meet criteria / has evidence of the following diagnosis/diagnoses:          [ ]  Mild Protein Calorie Malnutrition        [ ]  Moderate Protein Calorie Malnutrition        [ ] Severe Protein Calorie Malnutrition        [ ] Unspecified Protein Calorie Malnutrition        [ ] Underweight / BMI <19        [ x] Morbid Obesity / BMI > 40      Findings as based on:  •  Comprehensive nutrition assessment and consultation  •  Calorie counts (nutrient intake analysis)  •  Food acceptance and intake status from observations by staff  •  Follow up  •  Patient education  •  Intervention secondary to interdisciplinary rounds  •   concerns    Pt with BMI 54.7; 234% IBW.  Nutrition education not appropriate at this time.        Treatment:    The following diet has been recommended:          PROVIDER Section:     By signing this assessment you are acknowledging and agree with the diagnosis/diagnoses assigned by the Registered Dietitian    Comments:

## 2017-08-12 NOTE — PROGRESS NOTE ADULT - ASSESSMENT
- Will admit to surgery, tele bed  - Trial of nonoperative management   - NPO/IVF/NGT to LIWS for decompression  - Serial abdominal exams  - SQH/SCDs/OOBA  - ISS  - No current indication for abx  - CXR to confirm placement  - Will hold narcotics and antiemetics - Will admit to surgery, tele bed  - Trial of nonoperative management   - NPO/IVF/NGT to LIWS for decompression  - Serial abdominal exams  - SQH/SCDs/OOBA  - ISS  - No current indication for abx  - bariatric consult

## 2017-08-12 NOTE — PROGRESS NOTE ADULT - SUBJECTIVE AND OBJECTIVE BOX
O/N: ADRIANNA  8/11: diarrhea x1; + flatus; NGT- 100cc; no flatus O/N: ADRIANNA  8/11: diarrhea x1; + flatus; NGT- 100cc; no flatus     STATUS POST:       SUBJECTIVE: Patient seen and examined bedside by chief resident.    heparin  Injectable 7500 Unit(s) SubCutaneous every 8 hours      Vital Signs Last 24 Hrs  T(C): 36.8 (12 Aug 2017 05:29), Max: 37.4 (11 Aug 2017 18:22)  T(F): 98.2 (12 Aug 2017 05:29), Max: 99.4 (11 Aug 2017 18:22)  HR: 87 (12 Aug 2017 05:30) (78 - 93)  BP: 138/53 (12 Aug 2017 05:30) (120/59 - 155/67)  BP(mean): 79 (12 Aug 2017 05:30) (77 - 100)  RR: 18 (12 Aug 2017 05:30) (16 - 19)  SpO2: 96% (12 Aug 2017 05:30) (93% - 96%)  I&O's Detail    11 Aug 2017 07:01  -  12 Aug 2017 07:00  --------------------------------------------------------  IN:    sodium chloride 0.9%.: 1650 mL  Total IN: 1650 mL    OUT:    Nasoenteral Tube: 500 mL  Total OUT: 500 mL    Total NET: 1150 mL          General: NAD, resting comfortably in bed  Abd: soft, obese, non distended, palpable ventral hernia, non tender     LABS:                        13.3   7.8   )-----------( 287      ( 12 Aug 2017 06:48 )             40.6     08-12    138  |  103  |  13  ----------------------------<  136<H>  3.6   |  21<L>  |  1.00    Ca    7.6<L>      12 Aug 2017 06:48  Phos  2.2     08-12  Mg     2.2     08-12    TPro  7.2  /  Alb  3.4  /  TBili  0.6  /  DBili  x   /  AST  32  /  ALT  20  /  AlkPhos  69  08-10    PT/INR - ( 11 Aug 2017 06:07 )   PT: 14.1 sec;   INR: 1.26          PTT - ( 11 Aug 2017 06:07 )  PTT:26.9 sec  Urinalysis Basic - ( 10 Aug 2017 20:41 )    Color: x / Appearance: x / SG: x / pH: x  Gluc: x / Ketone: x  / Bili: x / Urobili: x   Blood: x / Protein: x / Nitrite: x   Leuk Esterase: x / RBC: < 5 /HPF / WBC < 5 /HPF   Sq Epi: x / Non Sq Epi: Few /HPF / Bacteria: Present /HPF        RADIOLOGY & ADDITIONAL STUDIES:

## 2017-08-13 LAB
ANION GAP SERPL CALC-SCNC: 14 MMOL/L — SIGNIFICANT CHANGE UP (ref 5–17)
BUN SERPL-MCNC: 10 MG/DL — SIGNIFICANT CHANGE UP (ref 7–23)
CALCIUM SERPL-MCNC: 8 MG/DL — LOW (ref 8.4–10.5)
CHLORIDE SERPL-SCNC: 105 MMOL/L — SIGNIFICANT CHANGE UP (ref 96–108)
CO2 SERPL-SCNC: 25 MMOL/L — SIGNIFICANT CHANGE UP (ref 22–31)
CREAT SERPL-MCNC: 0.9 MG/DL — SIGNIFICANT CHANGE UP (ref 0.5–1.3)
GLUCOSE SERPL-MCNC: 116 MG/DL — HIGH (ref 70–99)
HCT VFR BLD CALC: 39.9 % — SIGNIFICANT CHANGE UP (ref 39–50)
HGB BLD-MCNC: 12.9 G/DL — LOW (ref 13–17)
MAGNESIUM SERPL-MCNC: 2.2 MG/DL — SIGNIFICANT CHANGE UP (ref 1.6–2.6)
MCHC RBC-ENTMCNC: 28.3 PG — SIGNIFICANT CHANGE UP (ref 27–34)
MCHC RBC-ENTMCNC: 32.3 G/DL — SIGNIFICANT CHANGE UP (ref 32–36)
MCV RBC AUTO: 87.5 FL — SIGNIFICANT CHANGE UP (ref 80–100)
PHOSPHATE SERPL-MCNC: 2.2 MG/DL — LOW (ref 2.5–4.5)
PLATELET # BLD AUTO: 271 K/UL — SIGNIFICANT CHANGE UP (ref 150–400)
POTASSIUM SERPL-MCNC: 3.8 MMOL/L — SIGNIFICANT CHANGE UP (ref 3.5–5.3)
POTASSIUM SERPL-SCNC: 3.8 MMOL/L — SIGNIFICANT CHANGE UP (ref 3.5–5.3)
RBC # BLD: 4.56 M/UL — SIGNIFICANT CHANGE UP (ref 4.2–5.8)
RBC # FLD: 14.5 % — SIGNIFICANT CHANGE UP (ref 10.3–16.9)
SODIUM SERPL-SCNC: 144 MMOL/L — SIGNIFICANT CHANGE UP (ref 135–145)
WBC # BLD: 12.9 K/UL — HIGH (ref 3.8–10.5)
WBC # FLD AUTO: 12.9 K/UL — HIGH (ref 3.8–10.5)

## 2017-08-13 PROCEDURE — 74177 CT ABD & PELVIS W/CONTRAST: CPT | Mod: 26

## 2017-08-13 RX ORDER — DIATRIZOATE MEGLUMINE 180 MG/ML
30 INJECTION, SOLUTION INTRAVESICAL ONCE
Qty: 0 | Refills: 0 | Status: DISCONTINUED | OUTPATIENT
Start: 2017-08-13 | End: 2017-08-13

## 2017-08-13 RX ORDER — IOHEXOL 300 MG/ML
50 INJECTION, SOLUTION INTRAVENOUS ONCE
Qty: 0 | Refills: 0 | Status: COMPLETED | OUTPATIENT
Start: 2017-08-13 | End: 2017-08-13

## 2017-08-13 RX ORDER — METOPROLOL TARTRATE 50 MG
5 TABLET ORAL ONCE
Qty: 0 | Refills: 0 | Status: COMPLETED | OUTPATIENT
Start: 2017-08-13 | End: 2017-08-13

## 2017-08-13 RX ORDER — POTASSIUM CHLORIDE 20 MEQ
10 PACKET (EA) ORAL
Qty: 0 | Refills: 0 | Status: COMPLETED | OUTPATIENT
Start: 2017-08-13 | End: 2017-08-13

## 2017-08-13 RX ORDER — IOHEXOL 300 MG/ML
50 INJECTION, SOLUTION INTRAVENOUS ONCE
Qty: 0 | Refills: 0 | Status: DISCONTINUED | OUTPATIENT
Start: 2017-08-13 | End: 2017-08-13

## 2017-08-13 RX ORDER — LABETALOL HCL 100 MG
10 TABLET ORAL ONCE
Qty: 0 | Refills: 0 | Status: COMPLETED | OUTPATIENT
Start: 2017-08-13 | End: 2017-08-13

## 2017-08-13 RX ORDER — PANTOPRAZOLE SODIUM 20 MG/1
40 TABLET, DELAYED RELEASE ORAL EVERY 24 HOURS
Qty: 0 | Refills: 0 | Status: DISCONTINUED | OUTPATIENT
Start: 2017-08-13 | End: 2017-08-15

## 2017-08-13 RX ADMIN — Medication 5 MILLIGRAM(S): at 02:07

## 2017-08-13 RX ADMIN — PANTOPRAZOLE SODIUM 40 MILLIGRAM(S): 20 TABLET, DELAYED RELEASE ORAL at 15:46

## 2017-08-13 RX ADMIN — Medication 100 MILLIEQUIVALENT(S): at 18:17

## 2017-08-13 RX ADMIN — SODIUM CHLORIDE 150 MILLILITER(S): 9 INJECTION INTRAMUSCULAR; INTRAVENOUS; SUBCUTANEOUS at 06:45

## 2017-08-13 RX ADMIN — Medication 10 MILLIGRAM(S): at 15:51

## 2017-08-13 RX ADMIN — HEPARIN SODIUM 7500 UNIT(S): 5000 INJECTION INTRAVENOUS; SUBCUTANEOUS at 15:46

## 2017-08-13 RX ADMIN — IOHEXOL 50 MILLILITER(S): 300 INJECTION, SOLUTION INTRAVENOUS at 09:00

## 2017-08-13 RX ADMIN — Medication 100 MILLIEQUIVALENT(S): at 12:21

## 2017-08-13 RX ADMIN — HEPARIN SODIUM 7500 UNIT(S): 5000 INJECTION INTRAVENOUS; SUBCUTANEOUS at 06:55

## 2017-08-13 NOTE — PROGRESS NOTE ADULT - SUBJECTIVE AND OBJECTIVE BOX
O/N: No acute events overnight.   8/12: NGT clamp trial --> 150 cc, NPO/NGT, OOB, No n/v, positive GI fxn O/N: No acute events overnight.   8/12: NGT clamp trial --> 150 cc, NPO/NGT, OOB, No n/v, positive GI fxn          SUBJECTIVE: Patient seen and examined bedside by chief resident. Reports improvement of abd pain, +bm, + flatus. Denied CP, SOB, N, V fever/ chills.    heparin  Injectable 7500 Unit(s) SubCutaneous every 8 hours      Vital Signs Last 24 Hrs  T(C): 37.1 (13 Aug 2017 05:15), Max: 38.1 (12 Aug 2017 21:47)  T(F): 98.7 (13 Aug 2017 05:15), Max: 100.5 (12 Aug 2017 21:47)  HR: 92 (13 Aug 2017 04:22) (80 - 92)  BP: 172/80 (13 Aug 2017 04:22) (138/65 - 174/73)  BP(mean): 115 (13 Aug 2017 04:22) (94 - 115)  RR: 18 (13 Aug 2017 04:22) (14 - 20)  SpO2: 94% (13 Aug 2017 04:22) (93% - 97%)  I&O's Detail    12 Aug 2017 07:01  -  13 Aug 2017 07:00  --------------------------------------------------------  IN:    sodium chloride 0.9%.: 1500 mL  Total IN: 1500 mL    OUT:    Nasoenteral Tube: 700 mL    Voided: 300 mL  Total OUT: 1000 mL    Total NET: 500 mL          General: NAD, resting comfortably in bed  C/V: NSR  Pulm: Nonlabored breathing, no respiratory distress  Abd: soft, obese, large palpable hernia, TTP   Extrem: WWP, no edema, SCDs in place        LABS:                        12.9   12.9  )-----------( 271      ( 13 Aug 2017 07:10 )             39.9     08-13    144  |  105  |  10  ----------------------------<  116<H>  3.8   |  25  |  0.90    Ca    8.0<L>      13 Aug 2017 07:10  Phos  2.2     08-13  Mg     2.2     08-13            RADIOLOGY & ADDITIONAL STUDIES:

## 2017-08-13 NOTE — PROGRESS NOTE ADULT - ASSESSMENT
62 year old male with large ventral hernia repair   pain control   DVT ppx   NPO / NGT  monitor GI fxn   CT with PO contrast through NGT this AM   If okay DC NGT

## 2017-08-14 LAB
ANION GAP SERPL CALC-SCNC: 15 MMOL/L — SIGNIFICANT CHANGE UP (ref 5–17)
BUN SERPL-MCNC: 7 MG/DL — SIGNIFICANT CHANGE UP (ref 7–23)
CALCIUM SERPL-MCNC: 8 MG/DL — LOW (ref 8.4–10.5)
CHLORIDE SERPL-SCNC: 103 MMOL/L — SIGNIFICANT CHANGE UP (ref 96–108)
CO2 SERPL-SCNC: 21 MMOL/L — LOW (ref 22–31)
CREAT SERPL-MCNC: 1 MG/DL — SIGNIFICANT CHANGE UP (ref 0.5–1.3)
GLUCOSE SERPL-MCNC: 137 MG/DL — HIGH (ref 70–99)
HCT VFR BLD CALC: 37.8 % — LOW (ref 39–50)
HGB BLD-MCNC: 12.6 G/DL — LOW (ref 13–17)
MAGNESIUM SERPL-MCNC: 2 MG/DL — SIGNIFICANT CHANGE UP (ref 1.6–2.6)
MCHC RBC-ENTMCNC: 28.5 PG — SIGNIFICANT CHANGE UP (ref 27–34)
MCHC RBC-ENTMCNC: 33.3 G/DL — SIGNIFICANT CHANGE UP (ref 32–36)
MCV RBC AUTO: 85.5 FL — SIGNIFICANT CHANGE UP (ref 80–100)
PHOSPHATE SERPL-MCNC: 2.2 MG/DL — LOW (ref 2.5–4.5)
PLATELET # BLD AUTO: 255 K/UL — SIGNIFICANT CHANGE UP (ref 150–400)
POTASSIUM SERPL-MCNC: 3.5 MMOL/L — SIGNIFICANT CHANGE UP (ref 3.5–5.3)
POTASSIUM SERPL-SCNC: 3.5 MMOL/L — SIGNIFICANT CHANGE UP (ref 3.5–5.3)
RBC # BLD: 4.42 M/UL — SIGNIFICANT CHANGE UP (ref 4.2–5.8)
RBC # FLD: 14.4 % — SIGNIFICANT CHANGE UP (ref 10.3–16.9)
SODIUM SERPL-SCNC: 139 MMOL/L — SIGNIFICANT CHANGE UP (ref 135–145)
WBC # BLD: 13.4 K/UL — HIGH (ref 3.8–10.5)
WBC # FLD AUTO: 13.4 K/UL — HIGH (ref 3.8–10.5)

## 2017-08-14 RX ORDER — POTASSIUM CHLORIDE 20 MEQ
10 PACKET (EA) ORAL
Qty: 0 | Refills: 0 | Status: COMPLETED | OUTPATIENT
Start: 2017-08-14 | End: 2017-08-14

## 2017-08-14 RX ORDER — SODIUM CHLORIDE 9 MG/ML
1000 INJECTION, SOLUTION INTRAVENOUS
Qty: 0 | Refills: 0 | Status: DISCONTINUED | OUTPATIENT
Start: 2017-08-14 | End: 2017-08-15

## 2017-08-14 RX ORDER — POTASSIUM PHOSPHATE, MONOBASIC POTASSIUM PHOSPHATE, DIBASIC 236; 224 MG/ML; MG/ML
15 INJECTION, SOLUTION INTRAVENOUS ONCE
Qty: 0 | Refills: 0 | Status: COMPLETED | OUTPATIENT
Start: 2017-08-14 | End: 2017-08-14

## 2017-08-14 RX ADMIN — Medication 100 MILLIEQUIVALENT(S): at 14:18

## 2017-08-14 RX ADMIN — SODIUM CHLORIDE 150 MILLILITER(S): 9 INJECTION, SOLUTION INTRAVENOUS at 09:44

## 2017-08-14 RX ADMIN — POTASSIUM PHOSPHATE, MONOBASIC POTASSIUM PHOSPHATE, DIBASIC 62.5 MILLIMOLE(S): 236; 224 INJECTION, SOLUTION INTRAVENOUS at 17:43

## 2017-08-14 RX ADMIN — Medication 100 MILLIEQUIVALENT(S): at 09:44

## 2017-08-14 RX ADMIN — Medication 100 MILLIEQUIVALENT(S): at 11:39

## 2017-08-14 RX ADMIN — HEPARIN SODIUM 7500 UNIT(S): 5000 INJECTION INTRAVENOUS; SUBCUTANEOUS at 07:10

## 2017-08-14 RX ADMIN — HEPARIN SODIUM 7500 UNIT(S): 5000 INJECTION INTRAVENOUS; SUBCUTANEOUS at 00:35

## 2017-08-14 RX ADMIN — HEPARIN SODIUM 7500 UNIT(S): 5000 INJECTION INTRAVENOUS; SUBCUTANEOUS at 15:48

## 2017-08-14 RX ADMIN — PANTOPRAZOLE SODIUM 40 MILLIGRAM(S): 20 TABLET, DELAYED RELEASE ORAL at 07:10

## 2017-08-14 NOTE — PROGRESS NOTE ADULT - SUBJECTIVE AND OBJECTIVE BOX
Resolving SBO. No abdominal pain, passing flatus. Abdomen is soft, NT. Advance diet, d/c home tomorrow

## 2017-08-14 NOTE — PROGRESS NOTE ADULT - SUBJECTIVE AND OBJECTIVE BOX
O/n: magdalena  8/13 IV protonix started. Rpt CT w/ contrast, Labetelol given for . NGT fell out durring ct, not replaced. O/n: magdalena  8/13 IV protonix started. Rpt CT w/ contrast, Labetelol given for . NGT fell out durring ct, not replaced.     STATUS POST:       SUBJECTIVE: Patient seen and examined bedside by chief resident. Endorses continued flatus and BM. Denies Cp, SOB, N, V, fever/ chills.     heparin  Injectable 7500 Unit(s) SubCutaneous every 8 hours      Vital Signs Last 24 Hrs  T(C): 37.2 (14 Aug 2017 09:37), Max: 37.2 (14 Aug 2017 09:37)  T(F): 98.9 (14 Aug 2017 09:37), Max: 98.9 (14 Aug 2017 09:37)  HR: 76 (14 Aug 2017 08:51) (76 - 82)  BP: 143/63 (14 Aug 2017 08:51) (143/63 - 172/79)  BP(mean): 91 (14 Aug 2017 08:51) (91 - 113)  RR: 18 (14 Aug 2017 08:51) (18 - 19)  SpO2: 92% (14 Aug 2017 08:51) (92% - 97%)  I&O's Detail    13 Aug 2017 07:01  -  14 Aug 2017 07:00  --------------------------------------------------------  IN:    sodium chloride 0.9%: 3300 mL  Total IN: 3300 mL    OUT:    Nasoenteral Tube: 300 mL    Voided: 1230 mL  Total OUT: 1530 mL    Total NET: 1770 mL          General: NAD, resting comfortably in bed  C/V: NSR  Pulm: Nonlabored breathing, no respiratory distress  Abd: Abd: soft, obese, large palpable hernia, non-tender  Extrem: WWP, no edema, SCDs in place        LABS:                        12.6   13.4  )-----------( 255      ( 14 Aug 2017 07:04 )             37.8     08-14    139  |  103  |  7   ----------------------------<  137<H>  3.5   |  21<L>  |  1.00    Ca    8.0<L>      14 Aug 2017 07:04  Phos  2.2     08-14  Mg     2.0     08-14            RADIOLOGY & ADDITIONAL STUDIES:

## 2017-08-14 NOTE — PROGRESS NOTE ADULT - ASSESSMENT
- Will admit to surgery, tele bed  - Trial of nonoperative management   - NPO/IVF/, Protonix  - Serial abdominal exams  - SQH/SCDs/OOBA  - ISS  - No current indication for abx  - CXR to confirm placement  - Will hold narcotics and antiemetics - Trial of nonoperative management   - NPO/IVF/, Protonix  - Serial abdominal exams  - SQH/SCDs/OOBA  - ISS  - No current indication for abx  - CXR to confirm placement  - Will hold narcotics and antiemetics

## 2017-08-15 ENCOUNTER — TRANSCRIPTION ENCOUNTER (OUTPATIENT)
Age: 62
End: 2017-08-15

## 2017-08-15 VITALS — TEMPERATURE: 98 F

## 2017-08-15 LAB
ANION GAP SERPL CALC-SCNC: 11 MMOL/L — SIGNIFICANT CHANGE UP (ref 5–17)
BUN SERPL-MCNC: 5 MG/DL — LOW (ref 7–23)
CALCIUM SERPL-MCNC: 8.5 MG/DL — SIGNIFICANT CHANGE UP (ref 8.4–10.5)
CHLORIDE SERPL-SCNC: 102 MMOL/L — SIGNIFICANT CHANGE UP (ref 96–108)
CO2 SERPL-SCNC: 26 MMOL/L — SIGNIFICANT CHANGE UP (ref 22–31)
CREAT SERPL-MCNC: 1 MG/DL — SIGNIFICANT CHANGE UP (ref 0.5–1.3)
CULTURE RESULTS: SIGNIFICANT CHANGE UP
CULTURE RESULTS: SIGNIFICANT CHANGE UP
GLUCOSE SERPL-MCNC: 130 MG/DL — HIGH (ref 70–99)
HCT VFR BLD CALC: 37.9 % — LOW (ref 39–50)
HGB BLD-MCNC: 12.3 G/DL — LOW (ref 13–17)
MAGNESIUM SERPL-MCNC: 2 MG/DL — SIGNIFICANT CHANGE UP (ref 1.6–2.6)
MCHC RBC-ENTMCNC: 28 PG — SIGNIFICANT CHANGE UP (ref 27–34)
MCHC RBC-ENTMCNC: 32.5 G/DL — SIGNIFICANT CHANGE UP (ref 32–36)
MCV RBC AUTO: 86.1 FL — SIGNIFICANT CHANGE UP (ref 80–100)
PHOSPHATE SERPL-MCNC: 3 MG/DL — SIGNIFICANT CHANGE UP (ref 2.5–4.5)
PLATELET # BLD AUTO: 255 K/UL — SIGNIFICANT CHANGE UP (ref 150–400)
POTASSIUM SERPL-MCNC: 3.7 MMOL/L — SIGNIFICANT CHANGE UP (ref 3.5–5.3)
POTASSIUM SERPL-SCNC: 3.7 MMOL/L — SIGNIFICANT CHANGE UP (ref 3.5–5.3)
RBC # BLD: 4.4 M/UL — SIGNIFICANT CHANGE UP (ref 4.2–5.8)
RBC # FLD: 14.7 % — SIGNIFICANT CHANGE UP (ref 10.3–16.9)
SODIUM SERPL-SCNC: 139 MMOL/L — SIGNIFICANT CHANGE UP (ref 135–145)
SPECIMEN SOURCE: SIGNIFICANT CHANGE UP
SPECIMEN SOURCE: SIGNIFICANT CHANGE UP
WBC # BLD: 11.3 K/UL — HIGH (ref 3.8–10.5)
WBC # FLD AUTO: 11.3 K/UL — HIGH (ref 3.8–10.5)

## 2017-08-15 PROCEDURE — 96376 TX/PRO/DX INJ SAME DRUG ADON: CPT | Mod: XU

## 2017-08-15 PROCEDURE — 86901 BLOOD TYPING SEROLOGIC RH(D): CPT

## 2017-08-15 PROCEDURE — 43753 TX GASTRO INTUB W/ASP: CPT

## 2017-08-15 PROCEDURE — 96375 TX/PRO/DX INJ NEW DRUG ADDON: CPT | Mod: XU

## 2017-08-15 PROCEDURE — 36415 COLL VENOUS BLD VENIPUNCTURE: CPT

## 2017-08-15 PROCEDURE — 71045 X-RAY EXAM CHEST 1 VIEW: CPT

## 2017-08-15 PROCEDURE — 93005 ELECTROCARDIOGRAM TRACING: CPT | Mod: XU

## 2017-08-15 PROCEDURE — 86850 RBC ANTIBODY SCREEN: CPT

## 2017-08-15 PROCEDURE — 85610 PROTHROMBIN TIME: CPT

## 2017-08-15 PROCEDURE — 84100 ASSAY OF PHOSPHORUS: CPT

## 2017-08-15 PROCEDURE — 83735 ASSAY OF MAGNESIUM: CPT

## 2017-08-15 PROCEDURE — 80048 BASIC METABOLIC PNL TOTAL CA: CPT

## 2017-08-15 PROCEDURE — 83605 ASSAY OF LACTIC ACID: CPT

## 2017-08-15 PROCEDURE — 80053 COMPREHEN METABOLIC PANEL: CPT

## 2017-08-15 PROCEDURE — 83690 ASSAY OF LIPASE: CPT

## 2017-08-15 PROCEDURE — 96374 THER/PROPH/DIAG INJ IV PUSH: CPT | Mod: XU

## 2017-08-15 PROCEDURE — 99285 EMERGENCY DEPT VISIT HI MDM: CPT | Mod: 25

## 2017-08-15 PROCEDURE — 86900 BLOOD TYPING SEROLOGIC ABO: CPT

## 2017-08-15 PROCEDURE — 74177 CT ABD & PELVIS W/CONTRAST: CPT

## 2017-08-15 PROCEDURE — 85027 COMPLETE CBC AUTOMATED: CPT

## 2017-08-15 PROCEDURE — 83036 HEMOGLOBIN GLYCOSYLATED A1C: CPT

## 2017-08-15 PROCEDURE — 81001 URINALYSIS AUTO W/SCOPE: CPT

## 2017-08-15 PROCEDURE — 85730 THROMBOPLASTIN TIME PARTIAL: CPT

## 2017-08-15 PROCEDURE — 87086 URINE CULTURE/COLONY COUNT: CPT

## 2017-08-15 PROCEDURE — 87040 BLOOD CULTURE FOR BACTERIA: CPT

## 2017-08-15 PROCEDURE — 85025 COMPLETE CBC W/AUTO DIFF WBC: CPT

## 2017-08-15 RX ADMIN — SODIUM CHLORIDE 150 MILLILITER(S): 9 INJECTION, SOLUTION INTRAVENOUS at 05:03

## 2017-08-15 RX ADMIN — PANTOPRAZOLE SODIUM 40 MILLIGRAM(S): 20 TABLET, DELAYED RELEASE ORAL at 08:27

## 2017-08-15 RX ADMIN — HEPARIN SODIUM 7500 UNIT(S): 5000 INJECTION INTRAVENOUS; SUBCUTANEOUS at 08:27

## 2017-08-15 RX ADMIN — HEPARIN SODIUM 7500 UNIT(S): 5000 INJECTION INTRAVENOUS; SUBCUTANEOUS at 00:27

## 2017-08-15 NOTE — DISCHARGE NOTE ADULT - ADDITIONAL INSTRUCTIONS
Please follow up with Dr Kelly in clinic in 1-2 weeks after discharge. Please call [phone] for an appointment.

## 2017-08-15 NOTE — DISCHARGE NOTE ADULT - CARE PROVIDER_API CALL
Noel Kelly), Surgery  [address]  [address]  [place] [address]  Phone: [phone]  Fax: [phone]

## 2017-08-15 NOTE — DISCHARGE NOTE ADULT - HOSPITAL COURSE
62 year old male with medical history including MO, DM, HTN and surgical history of colon resection, appendectomy, and cholecystectomy in 2010 with known large ventral hernia here with an small bowel obstruction secondary to an incarcerated large ventral hernia. Patient was made NPO with nasogastric tube in place. He recieved ciprofloxacin and flagyl  antibiotics upon admission, CT abdomen/pelvis with oral contrast on hospital day 3 showed contrast in the rectum.  Nasogastric tube was removed durng hospital day 3. Patient  was stepped down from telemetry to regional floor on hospital day 4. Patient’s diet was advanced as tolerated with continuation of flatus and bowel movements, and resolution of abdominal pain. Upon discharge, patient is  afebrile, hemodynamically stable, tolerating diet, with return of bowel function.

## 2017-08-15 NOTE — DISCHARGE NOTE ADULT - CARE PLAN
Principal Discharge DX:	SBO (small bowel obstruction)  Goal:	Recovery  Instructions for follow-up, activity and diet:	-Continue advancing your diet as tolerated and drinking plenty of fluids.

## 2017-08-15 NOTE — PROGRESS NOTE ADULT - ASSESSMENT
FLD, Protonix  - Serial abdominal exams  - SQH/SCDs/OOBA  - ISS  - No current indication for abx 62M Hx of DM, HTN, MO, and a surgical  hx of a partial sigmoidectomy, appendectomy, and cholecystectomy in 2010, an enlarged sigmoidal diverticulum presented to [facility] ED with a 5 day hx of 'burning' mid abd pain, +n+v gastric content.     - FLD, Protonix  - Serial abdominal exams  - SQH/SCDs/OOBA  - ISS  - No current indication for abx

## 2017-08-15 NOTE — DISCHARGE NOTE ADULT - MEDICATION SUMMARY - MEDICATIONS TO TAKE
I will START or STAY ON the medications listed below when I get home from the hospital:    quinapril 20 mg oral tablet  -- 1 tab(s) by mouth once a day  -- Indication: For HTN (hypertension)    NovoLOG 100 units/mL subcutaneous solution  -- 12 unit(s) subcutaneous 3 times a day  -- Indication: For DM type 2 (diabetes mellitus, type 2)    Levemir 100 units/mL subcutaneous solution  -- 33 unit(s) subcutaneous once a day  -- Indication: For DM type 2 (diabetes mellitus, type 2)    metFORMIN 500 mg oral tablet  -- 1 tab(s) by mouth 2 times a day  -- Indication: For DM type 2 (diabetes mellitus, type 2)

## 2017-08-15 NOTE — PROGRESS NOTE ADULT - SUBJECTIVE AND OBJECTIVE BOX
O/N: ADRIANNA  8/14: Advanced to cld then FLD. Tolerated well. Continued flatus, no BM today. Transfered to regional bed. O/N: ADRIANNA  8/14: Advanced to cld then FLD. Tolerated well. Continued flatus, no BM today. Transfered to regional bed.        SUBJECTIVE: Patient seen and examined bedside by chief resident. Endorses tolerating diet with diarrhea x3 and continued flatus through night. Denies CP, SOB, ABD pain, N, V, Fever/ chills.    heparin  Injectable 7500 Unit(s) SubCutaneous every 8 hours      Vital Signs Last 24 Hrs  T(C): 37.4 (15 Aug 2017 05:21), Max: 37.4 (15 Aug 2017 05:21)  T(F): 99.3 (15 Aug 2017 05:21), Max: 99.3 (15 Aug 2017 05:21)  HR: 88 (15 Aug 2017 06:14) (76 - 88)  BP: 168/67 (15 Aug 2017 06:14) (126/60 - 168/67)  BP(mean): 96 (15 Aug 2017 06:14) (87 - 98)  RR: 18 (15 Aug 2017 06:14) (18 - 18)  SpO2: 95% (15 Aug 2017 06:14) (92% - 95%)  I&O's Detail    14 Aug 2017 07:01  -  15 Aug 2017 07:00  --------------------------------------------------------  IN:    dextrose 5% + sodium chloride 0.45%: 900 mL    IV PiggyBack: 187.5 mL    sodium chloride 0.9%: 150 mL  Total IN: 1237.5 mL    OUT:    Voided: 675 mL  Total OUT: 675 mL    Total NET: 562.5 mL          General: NAD, sitting in chair  C/V: NSR  Pulm: Nonlabored breathing, no respiratory distress  Abd: Abd: soft, obese, large palpable hernia, non-tender  Extrem: WWP, no edema,         LABS:                        12.6   13.4  )-----------( 255      ( 14 Aug 2017 07:04 )             37.8     08-14    139  |  103  |  7   ----------------------------<  137<H>  3.5   |  21<L>  |  1.00    Ca    8.0<L>      14 Aug 2017 07:04  Phos  2.2     08-14  Mg     2.0     08-14            RADIOLOGY & ADDITIONAL STUDIES:

## 2017-08-17 DIAGNOSIS — I10 ESSENTIAL (PRIMARY) HYPERTENSION: ICD-10-CM

## 2017-08-17 DIAGNOSIS — E66.01 MORBID (SEVERE) OBESITY DUE TO EXCESS CALORIES: ICD-10-CM

## 2017-08-17 DIAGNOSIS — K43.7 OTHER AND UNSPECIFIED VENTRAL HERNIA WITH GANGRENE: ICD-10-CM

## 2017-08-17 DIAGNOSIS — E11.9 TYPE 2 DIABETES MELLITUS WITHOUT COMPLICATIONS: ICD-10-CM

## 2017-08-17 DIAGNOSIS — K56.5 INTESTINAL ADHESIONS [BANDS] WITH OBSTRUCTION (POSTINFECTION): ICD-10-CM

## 2017-09-05 ENCOUNTER — TRANSCRIPTION ENCOUNTER (OUTPATIENT)
Age: 62
End: 2017-09-05

## 2017-09-07 ENCOUNTER — TRANSCRIPTION ENCOUNTER (OUTPATIENT)
Age: 62
End: 2017-09-07

## 2017-09-14 ENCOUNTER — TRANSCRIPTION ENCOUNTER (OUTPATIENT)
Age: 62
End: 2017-09-14

## 2017-09-22 ENCOUNTER — TRANSCRIPTION ENCOUNTER (OUTPATIENT)
Age: 62
End: 2017-09-22

## 2017-09-25 ENCOUNTER — OTHER (OUTPATIENT)
Age: 62
End: 2017-09-25

## 2017-09-26 ENCOUNTER — RX RENEWAL (OUTPATIENT)
Age: 62
End: 2017-09-26

## 2017-09-26 LAB
ALBUMIN SERPL ELPH-MCNC: 4.2 G/DL
ALP BLD-CCNC: 63 U/L
ALT SERPL-CCNC: 10 U/L
ANION GAP SERPL CALC-SCNC: 18 MMOL/L
AST SERPL-CCNC: 19 U/L
BASOPHILS # BLD AUTO: 0.05 K/UL
BASOPHILS NFR BLD AUTO: 0.6 %
BILIRUB SERPL-MCNC: 0.5 MG/DL
BUN SERPL-MCNC: 16 MG/DL
CALCIUM SERPL-MCNC: 10 MG/DL
CHLORIDE SERPL-SCNC: 102 MMOL/L
CO2 SERPL-SCNC: 22 MMOL/L
CREAT SERPL-MCNC: 1.18 MG/DL
EOSINOPHIL # BLD AUTO: 0.44 K/UL
EOSINOPHIL NFR BLD AUTO: 5.4 %
GLUCOSE SERPL-MCNC: 125 MG/DL
HCT VFR BLD CALC: 42.9 %
HGB BLD-MCNC: 13.8 G/DL
IMM GRANULOCYTES NFR BLD AUTO: 0.4 %
LYMPHOCYTES # BLD AUTO: 1.46 K/UL
LYMPHOCYTES NFR BLD AUTO: 17.8 %
MAN DIFF?: NORMAL
MCHC RBC-ENTMCNC: 28.7 PG
MCHC RBC-ENTMCNC: 32.2 GM/DL
MCV RBC AUTO: 89.2 FL
MONOCYTES # BLD AUTO: 0.62 K/UL
MONOCYTES NFR BLD AUTO: 7.6 %
NEUTROPHILS # BLD AUTO: 5.61 K/UL
NEUTROPHILS NFR BLD AUTO: 68.2 %
PLATELET # BLD AUTO: 337 K/UL
POTASSIUM SERPL-SCNC: 4.3 MMOL/L
PROT SERPL-MCNC: 7.4 G/DL
RBC # BLD: 4.81 M/UL
RBC # FLD: 15.3 %
SODIUM SERPL-SCNC: 142 MMOL/L
WBC # FLD AUTO: 8.21 K/UL

## 2017-10-02 ENCOUNTER — TRANSCRIPTION ENCOUNTER (OUTPATIENT)
Age: 62
End: 2017-10-02

## 2017-10-02 ENCOUNTER — RESULT REVIEW (OUTPATIENT)
Age: 62
End: 2017-10-02

## 2017-10-05 ENCOUNTER — RX RENEWAL (OUTPATIENT)
Age: 62
End: 2017-10-05

## 2017-10-05 ENCOUNTER — TRANSCRIPTION ENCOUNTER (OUTPATIENT)
Age: 62
End: 2017-10-05

## 2017-10-09 ENCOUNTER — APPOINTMENT (OUTPATIENT)
Dept: PULMONOLOGY | Facility: CLINIC | Age: 62
End: 2017-10-09
Payer: COMMERCIAL

## 2017-10-09 ENCOUNTER — APPOINTMENT (OUTPATIENT)
Dept: OPHTHALMOLOGY | Facility: CLINIC | Age: 62
End: 2017-10-09
Payer: COMMERCIAL

## 2017-10-09 VITALS
HEART RATE: 75 BPM | HEIGHT: 67 IN | WEIGHT: 315 LBS | DIASTOLIC BLOOD PRESSURE: 74 MMHG | OXYGEN SATURATION: 97 % | BODY MASS INDEX: 49.44 KG/M2 | SYSTOLIC BLOOD PRESSURE: 120 MMHG | TEMPERATURE: 97.6 F

## 2017-10-09 DIAGNOSIS — H43.393 OTHER VITREOUS OPACITIES, BILATERAL: ICD-10-CM

## 2017-10-09 PROCEDURE — 99213 OFFICE O/P EST LOW 20 MIN: CPT | Mod: 25

## 2017-10-09 PROCEDURE — 92014 COMPRE OPH EXAM EST PT 1/>: CPT

## 2017-10-09 PROCEDURE — G0008: CPT

## 2017-10-09 PROCEDURE — 94010 BREATHING CAPACITY TEST: CPT

## 2017-10-09 PROCEDURE — 90686 IIV4 VACC NO PRSV 0.5 ML IM: CPT

## 2017-10-09 PROCEDURE — 92134 CPTRZ OPH DX IMG PST SGM RTA: CPT

## 2017-10-11 ENCOUNTER — TRANSCRIPTION ENCOUNTER (OUTPATIENT)
Age: 62
End: 2017-10-11

## 2017-10-17 ENCOUNTER — RX RENEWAL (OUTPATIENT)
Age: 62
End: 2017-10-17

## 2017-10-18 ENCOUNTER — OTHER (OUTPATIENT)
Age: 62
End: 2017-10-18

## 2017-10-20 ENCOUNTER — RX RENEWAL (OUTPATIENT)
Age: 62
End: 2017-10-20

## 2017-10-23 ENCOUNTER — RX RENEWAL (OUTPATIENT)
Age: 62
End: 2017-10-23

## 2017-10-30 ENCOUNTER — TRANSCRIPTION ENCOUNTER (OUTPATIENT)
Age: 62
End: 2017-10-30

## 2017-10-31 ENCOUNTER — APPOINTMENT (OUTPATIENT)
Dept: GASTROENTEROLOGY | Facility: HOSPITAL | Age: 62
End: 2017-10-31

## 2017-10-31 ENCOUNTER — OUTPATIENT (OUTPATIENT)
Dept: OUTPATIENT SERVICES | Facility: HOSPITAL | Age: 62
LOS: 1 days | Discharge: HOME | End: 2017-10-31
Payer: COMMERCIAL

## 2017-10-31 ENCOUNTER — RESULT REVIEW (OUTPATIENT)
Age: 62
End: 2017-10-31

## 2017-10-31 DIAGNOSIS — Z90.49 ACQUIRED ABSENCE OF OTHER SPECIFIED PARTS OF DIGESTIVE TRACT: Chronic | ICD-10-CM

## 2017-10-31 LAB — GLUCOSE BLDC GLUCOMTR-MCNC: 106 MG/DL — HIGH (ref 70–99)

## 2017-10-31 PROCEDURE — 82962 GLUCOSE BLOOD TEST: CPT

## 2017-10-31 PROCEDURE — 45380 COLONOSCOPY AND BIOPSY: CPT | Mod: GC

## 2017-10-31 PROCEDURE — 88305 TISSUE EXAM BY PATHOLOGIST: CPT

## 2017-10-31 PROCEDURE — 45380 COLONOSCOPY AND BIOPSY: CPT

## 2017-11-01 LAB
SURGICAL PATHOLOGY STUDY: SIGNIFICANT CHANGE UP
SURGICAL PATHOLOGY STUDY: SIGNIFICANT CHANGE UP

## 2017-11-08 DIAGNOSIS — Z88.0 ALLERGY STATUS TO PENICILLIN: ICD-10-CM

## 2017-11-08 DIAGNOSIS — Z79.4 LONG TERM (CURRENT) USE OF INSULIN: ICD-10-CM

## 2017-11-08 DIAGNOSIS — J45.909 UNSPECIFIED ASTHMA, UNCOMPLICATED: ICD-10-CM

## 2017-11-08 DIAGNOSIS — K51.90 ULCERATIVE COLITIS, UNSPECIFIED, WITHOUT COMPLICATIONS: ICD-10-CM

## 2017-11-08 DIAGNOSIS — K52.89 OTHER SPECIFIED NONINFECTIVE GASTROENTERITIS AND COLITIS: ICD-10-CM

## 2017-11-08 DIAGNOSIS — E11.9 TYPE 2 DIABETES MELLITUS WITHOUT COMPLICATIONS: ICD-10-CM

## 2017-11-08 DIAGNOSIS — Z79.84 LONG TERM (CURRENT) USE OF ORAL HYPOGLYCEMIC DRUGS: ICD-10-CM

## 2017-11-08 DIAGNOSIS — E66.9 OBESITY, UNSPECIFIED: ICD-10-CM

## 2017-11-08 DIAGNOSIS — G47.33 OBSTRUCTIVE SLEEP APNEA (ADULT) (PEDIATRIC): ICD-10-CM

## 2017-11-08 DIAGNOSIS — I10 ESSENTIAL (PRIMARY) HYPERTENSION: ICD-10-CM

## 2017-11-09 ENCOUNTER — APPOINTMENT (OUTPATIENT)
Dept: GASTROENTEROLOGY | Facility: CLINIC | Age: 62
End: 2017-11-09
Payer: COMMERCIAL

## 2017-11-09 VITALS
DIASTOLIC BLOOD PRESSURE: 85 MMHG | BODY MASS INDEX: 53.25 KG/M2 | HEART RATE: 90 BPM | RESPIRATION RATE: 16 BRPM | WEIGHT: 315 LBS | OXYGEN SATURATION: 98 % | SYSTOLIC BLOOD PRESSURE: 162 MMHG

## 2017-11-09 PROCEDURE — 99213 OFFICE O/P EST LOW 20 MIN: CPT | Mod: GC

## 2017-11-29 ENCOUNTER — RX RENEWAL (OUTPATIENT)
Age: 62
End: 2017-11-29

## 2017-12-04 PROBLEM — E11.9 TYPE 2 DIABETES MELLITUS WITHOUT COMPLICATIONS: Chronic | Status: ACTIVE | Noted: 2017-08-10

## 2017-12-04 PROBLEM — E78.5 HYPERLIPIDEMIA, UNSPECIFIED: Chronic | Status: ACTIVE | Noted: 2017-08-10

## 2017-12-04 PROBLEM — I10 ESSENTIAL (PRIMARY) HYPERTENSION: Chronic | Status: ACTIVE | Noted: 2017-08-10

## 2017-12-11 ENCOUNTER — RX RENEWAL (OUTPATIENT)
Age: 62
End: 2017-12-11

## 2017-12-12 ENCOUNTER — RX RENEWAL (OUTPATIENT)
Age: 62
End: 2017-12-12

## 2017-12-18 ENCOUNTER — APPOINTMENT (OUTPATIENT)
Dept: INTERNAL MEDICINE | Facility: CLINIC | Age: 62
End: 2017-12-18
Payer: COMMERCIAL

## 2017-12-18 ENCOUNTER — RESULT CHARGE (OUTPATIENT)
Age: 62
End: 2017-12-18

## 2017-12-18 VITALS
OXYGEN SATURATION: 95 % | SYSTOLIC BLOOD PRESSURE: 144 MMHG | HEART RATE: 88 BPM | DIASTOLIC BLOOD PRESSURE: 88 MMHG | TEMPERATURE: 98.6 F

## 2017-12-18 LAB — HBA1C MFR BLD HPLC: 6.4

## 2017-12-18 PROCEDURE — 99214 OFFICE O/P EST MOD 30 MIN: CPT | Mod: 25,GC

## 2017-12-18 PROCEDURE — G0447 BEHAVIOR COUNSEL OBESITY 15M: CPT | Mod: 24

## 2017-12-18 PROCEDURE — 83036 HEMOGLOBIN GLYCOSYLATED A1C: CPT | Mod: QW

## 2018-01-02 ENCOUNTER — TRANSCRIPTION ENCOUNTER (OUTPATIENT)
Age: 63
End: 2018-01-02

## 2018-01-03 ENCOUNTER — TRANSCRIPTION ENCOUNTER (OUTPATIENT)
Age: 63
End: 2018-01-03

## 2018-01-03 ENCOUNTER — MEDICATION RENEWAL (OUTPATIENT)
Age: 63
End: 2018-01-03

## 2018-01-04 ENCOUNTER — TRANSCRIPTION ENCOUNTER (OUTPATIENT)
Age: 63
End: 2018-01-04

## 2018-01-09 ENCOUNTER — TRANSCRIPTION ENCOUNTER (OUTPATIENT)
Age: 63
End: 2018-01-09

## 2018-01-16 ENCOUNTER — RX RENEWAL (OUTPATIENT)
Age: 63
End: 2018-01-16

## 2018-01-16 ENCOUNTER — TRANSCRIPTION ENCOUNTER (OUTPATIENT)
Age: 63
End: 2018-01-16

## 2018-02-08 ENCOUNTER — TRANSCRIPTION ENCOUNTER (OUTPATIENT)
Age: 63
End: 2018-02-08

## 2018-02-22 ENCOUNTER — TRANSCRIPTION ENCOUNTER (OUTPATIENT)
Age: 63
End: 2018-02-22

## 2018-02-26 ENCOUNTER — APPOINTMENT (OUTPATIENT)
Dept: SURGERY | Facility: CLINIC | Age: 63
End: 2018-02-26
Payer: COMMERCIAL

## 2018-02-26 VITALS
TEMPERATURE: 98.6 F | OXYGEN SATURATION: 97 % | BODY MASS INDEX: 47.19 KG/M2 | WEIGHT: 315 LBS | DIASTOLIC BLOOD PRESSURE: 100 MMHG | HEART RATE: 96 BPM | SYSTOLIC BLOOD PRESSURE: 161 MMHG | HEIGHT: 68.5 IN

## 2018-02-26 DIAGNOSIS — F15.90 OTHER STIMULANT USE, UNSPECIFIED, UNCOMPLICATED: ICD-10-CM

## 2018-02-26 PROCEDURE — 99204 OFFICE O/P NEW MOD 45 MIN: CPT

## 2018-03-01 ENCOUNTER — RX RENEWAL (OUTPATIENT)
Age: 63
End: 2018-03-01

## 2018-03-05 ENCOUNTER — TRANSCRIPTION ENCOUNTER (OUTPATIENT)
Age: 63
End: 2018-03-05

## 2018-03-05 ENCOUNTER — CLINICAL ADVICE (OUTPATIENT)
Age: 63
End: 2018-03-05

## 2018-03-06 PROBLEM — F15.90 CAFFEINE USE: Status: ACTIVE | Noted: 2018-02-26

## 2018-03-07 ENCOUNTER — RX RENEWAL (OUTPATIENT)
Age: 63
End: 2018-03-07

## 2018-03-07 ENCOUNTER — TRANSCRIPTION ENCOUNTER (OUTPATIENT)
Age: 63
End: 2018-03-07

## 2018-03-12 ENCOUNTER — RX RENEWAL (OUTPATIENT)
Age: 63
End: 2018-03-12

## 2018-03-16 ENCOUNTER — TRANSCRIPTION ENCOUNTER (OUTPATIENT)
Age: 63
End: 2018-03-16

## 2018-03-19 ENCOUNTER — TRANSCRIPTION ENCOUNTER (OUTPATIENT)
Age: 63
End: 2018-03-19

## 2018-03-22 ENCOUNTER — TRANSCRIPTION ENCOUNTER (OUTPATIENT)
Age: 63
End: 2018-03-22

## 2018-03-29 ENCOUNTER — TRANSCRIPTION ENCOUNTER (OUTPATIENT)
Age: 63
End: 2018-03-29

## 2018-04-10 ENCOUNTER — RX RENEWAL (OUTPATIENT)
Age: 63
End: 2018-04-10

## 2018-04-11 ENCOUNTER — TRANSCRIPTION ENCOUNTER (OUTPATIENT)
Age: 63
End: 2018-04-11

## 2018-04-20 ENCOUNTER — APPOINTMENT (OUTPATIENT)
Dept: BARIATRICS | Facility: CLINIC | Age: 63
End: 2018-04-20
Payer: COMMERCIAL

## 2018-04-20 VITALS
BODY MASS INDEX: 47.19 KG/M2 | OXYGEN SATURATION: 97 % | HEART RATE: 86 BPM | WEIGHT: 315 LBS | DIASTOLIC BLOOD PRESSURE: 77 MMHG | HEIGHT: 68.5 IN | SYSTOLIC BLOOD PRESSURE: 120 MMHG | TEMPERATURE: 98.4 F

## 2018-04-20 PROCEDURE — 99203 OFFICE O/P NEW LOW 30 MIN: CPT

## 2018-04-25 ENCOUNTER — APPOINTMENT (OUTPATIENT)
Dept: PULMONOLOGY | Facility: CLINIC | Age: 63
End: 2018-04-25
Payer: COMMERCIAL

## 2018-04-25 VITALS
BODY MASS INDEX: 47.19 KG/M2 | TEMPERATURE: 98.2 F | HEART RATE: 92 BPM | HEIGHT: 68.5 IN | WEIGHT: 315 LBS | OXYGEN SATURATION: 95 % | SYSTOLIC BLOOD PRESSURE: 124 MMHG | DIASTOLIC BLOOD PRESSURE: 86 MMHG

## 2018-04-25 PROCEDURE — 94010 BREATHING CAPACITY TEST: CPT

## 2018-04-25 PROCEDURE — 99213 OFFICE O/P EST LOW 20 MIN: CPT | Mod: 25

## 2018-05-07 ENCOUNTER — TRANSCRIPTION ENCOUNTER (OUTPATIENT)
Age: 63
End: 2018-05-07

## 2018-05-08 ENCOUNTER — RX RENEWAL (OUTPATIENT)
Age: 63
End: 2018-05-08

## 2018-05-09 ENCOUNTER — RX RENEWAL (OUTPATIENT)
Age: 63
End: 2018-05-09

## 2018-05-16 ENCOUNTER — APPOINTMENT (OUTPATIENT)
Dept: INTERNAL MEDICINE | Facility: CLINIC | Age: 63
End: 2018-05-16
Payer: COMMERCIAL

## 2018-05-16 VITALS
DIASTOLIC BLOOD PRESSURE: 77 MMHG | TEMPERATURE: 98.8 F | BODY MASS INDEX: 47.74 KG/M2 | SYSTOLIC BLOOD PRESSURE: 137 MMHG | HEIGHT: 68 IN | OXYGEN SATURATION: 93 % | HEART RATE: 88 BPM | WEIGHT: 315 LBS

## 2018-05-16 PROCEDURE — 99213 OFFICE O/P EST LOW 20 MIN: CPT | Mod: 25,GE,Q5

## 2018-05-16 PROCEDURE — 36415 COLL VENOUS BLD VENIPUNCTURE: CPT

## 2018-05-16 RX ORDER — INSULIN ASPART 100 [IU]/ML
INJECTION, SOLUTION INTRAVENOUS; SUBCUTANEOUS
Refills: 0 | Status: COMPLETED | COMMUNITY
End: 2018-05-16

## 2018-05-16 RX ORDER — BUDESONIDE 180 UG/1
180 AEROSOL, POWDER RESPIRATORY (INHALATION)
Refills: 0 | Status: COMPLETED | COMMUNITY
End: 2018-05-16

## 2018-05-16 RX ORDER — MELATONIN 3 MG
TABLET ORAL
Refills: 0 | Status: COMPLETED | COMMUNITY
End: 2018-05-16

## 2018-05-16 RX ORDER — QUINAPRIL HYDROCHLORIDE 5 MG/1
TABLET, FILM COATED ORAL
Refills: 0 | Status: COMPLETED | COMMUNITY
End: 2018-05-16

## 2018-05-16 RX ORDER — ALBUTEROL SULFATE 90 UG/1
108 (90 BASE) AEROSOL, METERED RESPIRATORY (INHALATION)
Refills: 0 | Status: COMPLETED | COMMUNITY
End: 2018-05-16

## 2018-05-16 RX ORDER — INSULIN DETEMIR 100 [IU]/ML
INJECTION, SOLUTION SUBCUTANEOUS
Refills: 0 | Status: COMPLETED | COMMUNITY
End: 2018-05-16

## 2018-05-17 LAB
ALBUMIN SERPL ELPH-MCNC: 3.9 G/DL
ALP BLD-CCNC: 68 U/L
ALT SERPL-CCNC: 15 U/L
ANION GAP SERPL CALC-SCNC: 12 MMOL/L
AST SERPL-CCNC: 20 U/L
BILIRUB SERPL-MCNC: 0.2 MG/DL
BUN SERPL-MCNC: 22 MG/DL
CALCIUM SERPL-MCNC: 9.6 MG/DL
CHLORIDE SERPL-SCNC: 106 MMOL/L
CO2 SERPL-SCNC: 24 MMOL/L
CREAT SERPL-MCNC: 1.14 MG/DL
GLUCOSE SERPL-MCNC: 158 MG/DL
HBA1C MFR BLD HPLC: 6.4 %
POTASSIUM SERPL-SCNC: 4.5 MMOL/L
PROT SERPL-MCNC: 6.5 G/DL
SODIUM SERPL-SCNC: 142 MMOL/L

## 2018-05-17 NOTE — HISTORY OF PRESENT ILLNESS
[FreeTextEntry1] : pt is here for a follow up visit [de-identified] : 63 year old male with PMH DM2 on insulin, HLD, HTN, asthma, morbid obesity (BMP > 50), UC s/p partial colectomy, multiple abdominal hernias presenting for follow up visit. Since his has visit, patient has seen Dr. Douglas regarding possible hernia repair who stated patient needs to lose weight prior to a hernia repair due to high risk.  Dr. Douglas sent him to a bariatric surgeon, Dr. Butler, who stated he is unable to due a laparoscopic procedure due the hernia, but could potentially do an endoscopic procedure, though it does not have the success rates of other procedures.  Patient is aware that he needs to lose weight in order to have the procedures.  He complains of intermittent headaches he states are due to a left-sided nasal polyp. Headaches occur 1-2 times per month, start in his nose then spread to his left forehead and temple.  He states he feels like "pain" which he cannot further describe, accompanied by dizziness he cannot further describe. Had 1 in the past month. Denies any falls, confusion, blurry vision, or other associated symptoms.  States his diet consists of two large meals, with occasional sugar-free cookies.  He would like to start eating multiple small meals during the day, but has difficulty doing it.

## 2018-05-17 NOTE — HEALTH RISK ASSESSMENT
[0] : 2) Feeling down, depressed, or hopeless: Not at all (0) [No falls in past year] : Patient reported no falls in the past year [] : No [[id number]] : 0

## 2018-05-17 NOTE — REVIEW OF SYSTEMS
[Negative] : Psychiatric [Fever] : no fever [Chills] : no chills [Fatigue] : no fatigue [Night Sweats] : no night sweats [Discharge] : no discharge [Pain] : no pain [Redness] : no redness [Dryness] : no dryness [Itching] : no itching [Hearing Loss] : no hearing loss [Nosebleeds] : no nosebleeds [Nasal Discharge] : no nasal discharge [Sore Throat] : no sore throat [Chest Pain] : no chest pain [Palpitations] : no palpitations [Lower Ext Edema] : no lower extremity edema [Shortness Of Breath] : no shortness of breath [Wheezing] : no wheezing [Cough] : no cough [Nausea] : no nausea [Constipation] : no constipation [Diarrhea] : no diarrhea [Vomiting] : no vomiting [Dysuria] : no dysuria [Incontinence] : no incontinence [Hesitancy] : no hesitancy [Fainting] : no fainting [Confusion] : no confusion [Memory Loss] : no memory loss

## 2018-05-17 NOTE — ASSESSMENT
[FreeTextEntry1] : 63 year old male with PMH morbid obesity, DM2 on insulin, asthma, HTN, HLD, UC s/p partial colectomy, multiple abdominal hernias, presenting for follow-up visit. \par \par 1) DM2 on insulin: last HgbA1c 6.4 (POCT), patient without any changes to diet or insulin regimen.  Seen by ophthalmology within past year per patient\par - podiatry referral, last seen over 1 year ago per patient\par - endocrinology referral; though patient well controlled diabetes has significant difficulty with weight, so endocrine may be able to further assist \par - check A1c today with CMP for renal function\par \par 2) Obesity: seen by Dr. Butler, bariatric surgery, who states given patient's hernia he is unable to laparoscopic bariatric procedure. Discussed dietary changes with patient, who has seen nutritionist in past without any change to diet.  \par - patient willing to try eating small amount few hours prior to dinner, to see if it will help him eat less at dinner. Discussed multiple other changes, including eating lunch, which patient not willing to do. \par - endocrinology referral for assistance with weight loss\par \par 3) ventral hernia: one prior episode of SBO which resolved with conservative measures. Seen by Dr. Douglas, surgery, who states he is unable to repair hernia at this time due to morbid obesity. \par \par 4) HTN: well controlled, continue quinapril\par \par 5) HLD: continue atorvastatin 40mg daily\par \par 6) UC: controlled, continue balsalazide per Dr. Thomson. Last colonoscopy 10/2017\par \par 7) Asthma - currently asymptomatic; switched from Pulmicort to Advair by Dr. Barr. Continue Ventolin.\par

## 2018-05-17 NOTE — PHYSICAL EXAM
[No Acute Distress] : no acute distress [Well-Appearing] : well-appearing [Normal Voice/Communication] : normal voice/communication [Normal Sclera/Conjunctiva] : normal sclera/conjunctiva [PERRL] : pupils equal round and reactive to light [EOMI] : extraocular movements intact [Normal Outer Ear/Nose] : the outer ears and nose were normal in appearance [Normal Oropharynx] : the oropharynx was normal [Normal Nasal Mucosa] : the nasal mucosa was normal [No JVD] : no jugular venous distention [Supple] : supple [No Lymphadenopathy] : no lymphadenopathy [No Respiratory Distress] : no respiratory distress  [Clear to Auscultation] : lungs were clear to auscultation bilaterally [No Accessory Muscle Use] : no accessory muscle use [Normal Rate] : normal rate  [Regular Rhythm] : with a regular rhythm [Normal S1, S2] : normal S1 and S2 [No Murmur] : no murmur heard [Soft] : abdomen soft [Normal Supraclavicular Nodes] : no supraclavicular lymphadenopathy [Normal Posterior Cervical Nodes] : no posterior cervical lymphadenopathy [Normal Anterior Cervical Nodes] : no anterior cervical lymphadenopathy [No CVA Tenderness] : no CVA  tenderness [No Rash] : no rash [Coordination Grossly Intact] : coordination grossly intact [Speech Grossly Normal] : speech grossly normal [Memory Grossly Normal] : memory grossly normal [Normal Mood] : the mood was normal [de-identified] : obese [de-identified] : very large LLQ hernia, with bowel present, nontender

## 2018-06-06 ENCOUNTER — RX RENEWAL (OUTPATIENT)
Age: 63
End: 2018-06-06

## 2018-06-08 ENCOUNTER — TRANSCRIPTION ENCOUNTER (OUTPATIENT)
Age: 63
End: 2018-06-08

## 2018-06-27 ENCOUNTER — RX RENEWAL (OUTPATIENT)
Age: 63
End: 2018-06-27

## 2018-06-29 ENCOUNTER — RX RENEWAL (OUTPATIENT)
Age: 63
End: 2018-06-29

## 2018-07-05 ENCOUNTER — APPOINTMENT (OUTPATIENT)
Dept: OTOLARYNGOLOGY | Facility: CLINIC | Age: 63
End: 2018-07-05
Payer: COMMERCIAL

## 2018-07-05 VITALS
HEIGHT: 68 IN | SYSTOLIC BLOOD PRESSURE: 149 MMHG | BODY MASS INDEX: 47.74 KG/M2 | WEIGHT: 315 LBS | HEART RATE: 83 BPM | DIASTOLIC BLOOD PRESSURE: 83 MMHG

## 2018-07-05 DIAGNOSIS — I83.90 ASYMPTOMATIC VARICOSE VEINS OF UNSPECIFIED LOWER EXTREMITY: ICD-10-CM

## 2018-07-05 DIAGNOSIS — R06.2 WHEEZING: ICD-10-CM

## 2018-07-05 DIAGNOSIS — Z99.89 DEPENDENCE ON OTHER ENABLING MACHINES AND DEVICES: ICD-10-CM

## 2018-07-05 DIAGNOSIS — Z87.19 PERSONAL HISTORY OF OTHER DISEASES OF THE DIGESTIVE SYSTEM: ICD-10-CM

## 2018-07-05 PROCEDURE — 31231 NASAL ENDOSCOPY DX: CPT

## 2018-07-05 PROCEDURE — 99203 OFFICE O/P NEW LOW 30 MIN: CPT | Mod: 25

## 2018-07-06 ENCOUNTER — RX RENEWAL (OUTPATIENT)
Age: 63
End: 2018-07-06

## 2018-07-30 ENCOUNTER — RX RENEWAL (OUTPATIENT)
Age: 63
End: 2018-07-30

## 2018-08-01 ENCOUNTER — APPOINTMENT (OUTPATIENT)
Dept: OTOLARYNGOLOGY | Facility: CLINIC | Age: 63
End: 2018-08-01
Payer: COMMERCIAL

## 2018-08-01 VITALS
DIASTOLIC BLOOD PRESSURE: 69 MMHG | SYSTOLIC BLOOD PRESSURE: 134 MMHG | HEIGHT: 68 IN | BODY MASS INDEX: 47.74 KG/M2 | WEIGHT: 315 LBS | HEART RATE: 85 BPM

## 2018-08-01 DIAGNOSIS — Z11.59 ENCOUNTER FOR SCREENING FOR OTHER VIRAL DISEASES: ICD-10-CM

## 2018-08-01 DIAGNOSIS — Z11.4 ENCOUNTER FOR SCREENING FOR HUMAN IMMUNODEFICIENCY VIRUS [HIV]: ICD-10-CM

## 2018-08-01 DIAGNOSIS — Z92.29 PERSONAL HISTORY OF OTHER DRUG THERAPY: ICD-10-CM

## 2018-08-01 PROCEDURE — 99214 OFFICE O/P EST MOD 30 MIN: CPT

## 2018-08-08 PROBLEM — Z11.4 SCREENING FOR HIV (HUMAN IMMUNODEFICIENCY VIRUS): Status: RESOLVED | Noted: 2017-07-19 | Resolved: 2018-08-08

## 2018-08-09 ENCOUNTER — RX RENEWAL (OUTPATIENT)
Age: 63
End: 2018-08-09

## 2018-08-16 ENCOUNTER — APPOINTMENT (OUTPATIENT)
Dept: INTERNAL MEDICINE | Facility: CLINIC | Age: 63
End: 2018-08-16
Payer: SELF-PAY

## 2018-08-16 ENCOUNTER — APPOINTMENT (OUTPATIENT)
Dept: INTERNAL MEDICINE | Facility: CLINIC | Age: 63
End: 2018-08-16

## 2018-08-16 VITALS — BODY MASS INDEX: 55.27 KG/M2 | WEIGHT: 315 LBS

## 2018-08-16 PROCEDURE — 00006N: CUSTOM

## 2018-08-20 ENCOUNTER — LABORATORY RESULT (OUTPATIENT)
Age: 63
End: 2018-08-20

## 2018-08-20 ENCOUNTER — APPOINTMENT (OUTPATIENT)
Dept: ENDOCRINOLOGY | Facility: CLINIC | Age: 63
End: 2018-08-20
Payer: COMMERCIAL

## 2018-08-20 VITALS
WEIGHT: 315 LBS | BODY MASS INDEX: 47.74 KG/M2 | DIASTOLIC BLOOD PRESSURE: 68 MMHG | HEART RATE: 83 BPM | HEIGHT: 68 IN | SYSTOLIC BLOOD PRESSURE: 132 MMHG

## 2018-08-20 PROCEDURE — 99205 OFFICE O/P NEW HI 60 MIN: CPT

## 2018-09-03 ENCOUNTER — RX RENEWAL (OUTPATIENT)
Age: 63
End: 2018-09-03

## 2018-09-04 ENCOUNTER — RX RENEWAL (OUTPATIENT)
Age: 63
End: 2018-09-04

## 2018-09-05 ENCOUNTER — APPOINTMENT (OUTPATIENT)
Dept: OTOLARYNGOLOGY | Facility: CLINIC | Age: 63
End: 2018-09-05
Payer: COMMERCIAL

## 2018-09-05 VITALS
WEIGHT: 315 LBS | BODY MASS INDEX: 47.74 KG/M2 | HEART RATE: 79 BPM | DIASTOLIC BLOOD PRESSURE: 78 MMHG | HEIGHT: 68 IN | SYSTOLIC BLOOD PRESSURE: 126 MMHG

## 2018-09-05 PROCEDURE — 31231 NASAL ENDOSCOPY DX: CPT

## 2018-09-05 PROCEDURE — 99213 OFFICE O/P EST LOW 20 MIN: CPT | Mod: 25

## 2018-09-17 ENCOUNTER — RX RENEWAL (OUTPATIENT)
Age: 63
End: 2018-09-17

## 2018-09-24 VITALS — WEIGHT: 315 LBS | BODY MASS INDEX: 54.31 KG/M2

## 2018-10-12 VITALS — WEIGHT: 315 LBS | BODY MASS INDEX: 53.37 KG/M2

## 2018-10-29 ENCOUNTER — APPOINTMENT (OUTPATIENT)
Dept: OPHTHALMOLOGY | Facility: CLINIC | Age: 63
End: 2018-10-29
Payer: COMMERCIAL

## 2018-10-29 DIAGNOSIS — H40.003 PREGLAUCOMA, UNSPECIFIED, BILATERAL: ICD-10-CM

## 2018-10-29 PROCEDURE — 92014 COMPRE OPH EXAM EST PT 1/>: CPT

## 2018-10-29 PROCEDURE — 92133 CPTRZD OPH DX IMG PST SGM ON: CPT

## 2018-11-05 ENCOUNTER — RX RENEWAL (OUTPATIENT)
Age: 63
End: 2018-11-05

## 2018-11-08 ENCOUNTER — TRANSCRIPTION ENCOUNTER (OUTPATIENT)
Age: 63
End: 2018-11-08

## 2018-11-13 ENCOUNTER — APPOINTMENT (OUTPATIENT)
Dept: GASTROENTEROLOGY | Facility: CLINIC | Age: 63
End: 2018-11-13
Payer: COMMERCIAL

## 2018-11-13 VITALS
HEART RATE: 74 BPM | OXYGEN SATURATION: 97 % | BODY MASS INDEX: 47.74 KG/M2 | WEIGHT: 315 LBS | SYSTOLIC BLOOD PRESSURE: 126 MMHG | HEIGHT: 68 IN | DIASTOLIC BLOOD PRESSURE: 82 MMHG | RESPIRATION RATE: 14 BRPM | TEMPERATURE: 98.2 F

## 2018-11-13 PROCEDURE — 99214 OFFICE O/P EST MOD 30 MIN: CPT | Mod: 25

## 2018-11-13 PROCEDURE — 36415 COLL VENOUS BLD VENIPUNCTURE: CPT

## 2018-11-14 LAB
ALBUMIN SERPL ELPH-MCNC: 3.8 G/DL
ALP BLD-CCNC: 69 U/L
ALT SERPL-CCNC: 16 U/L
ANION GAP SERPL CALC-SCNC: 12 MMOL/L
AST SERPL-CCNC: 22 U/L
BASOPHILS # BLD AUTO: 0.13 K/UL
BASOPHILS NFR BLD AUTO: 1.8 %
BILIRUB SERPL-MCNC: 0.4 MG/DL
BUN SERPL-MCNC: 17 MG/DL
CALCIUM SERPL-MCNC: 9 MG/DL
CHLORIDE SERPL-SCNC: 106 MMOL/L
CO2 SERPL-SCNC: 25 MMOL/L
CREAT SERPL-MCNC: 1.02 MG/DL
CRP SERPL-MCNC: 0.54 MG/DL
EOSINOPHIL # BLD AUTO: 0.51 K/UL
EOSINOPHIL NFR BLD AUTO: 7.3 %
GLUCOSE SERPL-MCNC: 93 MG/DL
HCT VFR BLD CALC: 40.1 %
HGB BLD-MCNC: 12.6 G/DL
IMM GRANULOCYTES NFR BLD AUTO: 0.4 %
LYMPHOCYTES # BLD AUTO: 1.3 K/UL
LYMPHOCYTES NFR BLD AUTO: 18.5 %
MAN DIFF?: NORMAL
MCHC RBC-ENTMCNC: 28.1 PG
MCHC RBC-ENTMCNC: 31.4 GM/DL
MCV RBC AUTO: 89.3 FL
MONOCYTES # BLD AUTO: 0.62 K/UL
MONOCYTES NFR BLD AUTO: 8.8 %
NEUTROPHILS # BLD AUTO: 4.44 K/UL
NEUTROPHILS NFR BLD AUTO: 63.2 %
PLATELET # BLD AUTO: 301 K/UL
POTASSIUM SERPL-SCNC: 4.2 MMOL/L
PROT SERPL-MCNC: 6.6 G/DL
RBC # BLD: 4.49 M/UL
RBC # FLD: 15 %
SODIUM SERPL-SCNC: 143 MMOL/L
WBC # FLD AUTO: 7.03 K/UL

## 2018-11-29 LAB — CALPROTECTIN FECAL: <16 UG/G

## 2018-12-11 ENCOUNTER — TRANSCRIPTION ENCOUNTER (OUTPATIENT)
Age: 63
End: 2018-12-11

## 2018-12-11 ENCOUNTER — MEDICATION RENEWAL (OUTPATIENT)
Age: 63
End: 2018-12-11

## 2018-12-12 ENCOUNTER — RX RENEWAL (OUTPATIENT)
Age: 63
End: 2018-12-12

## 2018-12-26 ENCOUNTER — RX RENEWAL (OUTPATIENT)
Age: 63
End: 2018-12-26

## 2018-12-31 ENCOUNTER — APPOINTMENT (OUTPATIENT)
Dept: INTERNAL MEDICINE | Facility: CLINIC | Age: 63
End: 2018-12-31
Payer: COMMERCIAL

## 2018-12-31 ENCOUNTER — INPATIENT (INPATIENT)
Facility: HOSPITAL | Age: 63
LOS: 0 days | Discharge: HOME | DRG: 607 | End: 2019-01-01
Attending: INTERNAL MEDICINE | Admitting: INTERNAL MEDICINE
Payer: COMMERCIAL

## 2018-12-31 ENCOUNTER — EMERGENCY (EMERGENCY)
Facility: HOSPITAL | Age: 63
LOS: 1 days | Discharge: HOME | End: 2018-12-31
Attending: EMERGENCY MEDICINE | Admitting: EMERGENCY MEDICINE
Payer: COMMERCIAL

## 2018-12-31 VITALS
SYSTOLIC BLOOD PRESSURE: 171 MMHG | WEIGHT: 315 LBS | HEART RATE: 86 BPM | TEMPERATURE: 99 F | DIASTOLIC BLOOD PRESSURE: 89 MMHG | OXYGEN SATURATION: 96 % | RESPIRATION RATE: 20 BRPM

## 2018-12-31 VITALS
HEART RATE: 135 BPM | OXYGEN SATURATION: 96 % | SYSTOLIC BLOOD PRESSURE: 102 MMHG | RESPIRATION RATE: 24 BRPM | DIASTOLIC BLOOD PRESSURE: 66 MMHG | TEMPERATURE: 98 F

## 2018-12-31 VITALS
SYSTOLIC BLOOD PRESSURE: 126 MMHG | OXYGEN SATURATION: 96 % | HEART RATE: 77 BPM | RESPIRATION RATE: 12 BRPM | WEIGHT: 315 LBS | BODY MASS INDEX: 47.74 KG/M2 | TEMPERATURE: 98.2 F | HEIGHT: 68 IN | DIASTOLIC BLOOD PRESSURE: 79 MMHG

## 2018-12-31 VITALS
OXYGEN SATURATION: 98 % | TEMPERATURE: 99 F | RESPIRATION RATE: 20 BRPM | DIASTOLIC BLOOD PRESSURE: 75 MMHG | SYSTOLIC BLOOD PRESSURE: 138 MMHG | HEART RATE: 75 BPM

## 2018-12-31 DIAGNOSIS — S89.92XA UNSPECIFIED INJURY OF LEFT LOWER LEG, INITIAL ENCOUNTER: ICD-10-CM

## 2018-12-31 DIAGNOSIS — Z90.49 ACQUIRED ABSENCE OF OTHER SPECIFIED PARTS OF DIGESTIVE TRACT: Chronic | ICD-10-CM

## 2018-12-31 DIAGNOSIS — E78.5 HYPERLIPIDEMIA, UNSPECIFIED: ICD-10-CM

## 2018-12-31 DIAGNOSIS — Z79.84 LONG TERM (CURRENT) USE OF ORAL HYPOGLYCEMIC DRUGS: ICD-10-CM

## 2018-12-31 DIAGNOSIS — E11.9 TYPE 2 DIABETES MELLITUS WITHOUT COMPLICATIONS: ICD-10-CM

## 2018-12-31 DIAGNOSIS — Z79.899 OTHER LONG TERM (CURRENT) DRUG THERAPY: ICD-10-CM

## 2018-12-31 DIAGNOSIS — M25.562 PAIN IN LEFT KNEE: ICD-10-CM

## 2018-12-31 DIAGNOSIS — Z88.0 ALLERGY STATUS TO PENICILLIN: ICD-10-CM

## 2018-12-31 DIAGNOSIS — L03.116 CELLULITIS OF LEFT LOWER LIMB: ICD-10-CM

## 2018-12-31 DIAGNOSIS — I10 ESSENTIAL (PRIMARY) HYPERTENSION: ICD-10-CM

## 2018-12-31 LAB
ALBUMIN SERPL ELPH-MCNC: 4 G/DL — SIGNIFICANT CHANGE UP (ref 3.3–5)
ALP SERPL-CCNC: 68 U/L — SIGNIFICANT CHANGE UP (ref 40–120)
ALT FLD-CCNC: 19 U/L — SIGNIFICANT CHANGE UP (ref 10–45)
ANION GAP SERPL CALC-SCNC: 14 MMOL/L — SIGNIFICANT CHANGE UP (ref 5–17)
ANION GAP SERPL CALC-SCNC: 21 MMOL/L — HIGH (ref 5–17)
AST SERPL-CCNC: 28 U/L — SIGNIFICANT CHANGE UP (ref 10–40)
BASOPHILS NFR BLD AUTO: 1 % — SIGNIFICANT CHANGE UP (ref 0–2)
BASOPHILS NFR BLD AUTO: 1.1 % — SIGNIFICANT CHANGE UP (ref 0–2)
BILIRUB SERPL-MCNC: 0.4 MG/DL — SIGNIFICANT CHANGE UP (ref 0.2–1.2)
BUN SERPL-MCNC: 17 MG/DL — SIGNIFICANT CHANGE UP (ref 7–23)
BUN SERPL-MCNC: 17 MG/DL — SIGNIFICANT CHANGE UP (ref 7–23)
CALCIUM SERPL-MCNC: 8.7 MG/DL — SIGNIFICANT CHANGE UP (ref 8.4–10.5)
CALCIUM SERPL-MCNC: 9 MG/DL — SIGNIFICANT CHANGE UP (ref 8.4–10.5)
CHLORIDE SERPL-SCNC: 100 MMOL/L — SIGNIFICANT CHANGE UP (ref 96–108)
CHLORIDE SERPL-SCNC: 102 MMOL/L — SIGNIFICANT CHANGE UP (ref 96–108)
CO2 SERPL-SCNC: 19 MMOL/L — LOW (ref 22–31)
CO2 SERPL-SCNC: 22 MMOL/L — SIGNIFICANT CHANGE UP (ref 22–31)
CREAT SERPL-MCNC: 1 MG/DL — SIGNIFICANT CHANGE UP (ref 0.5–1.3)
CREAT SERPL-MCNC: 1.18 MG/DL — SIGNIFICANT CHANGE UP (ref 0.5–1.3)
CRP SERPL-MCNC: 0.52 MG/DL — HIGH (ref 0–0.4)
EOSINOPHIL NFR BLD AUTO: 4.8 % — SIGNIFICANT CHANGE UP (ref 0–6)
EOSINOPHIL NFR BLD AUTO: 6.4 % — HIGH (ref 0–6)
ERYTHROCYTE [SEDIMENTATION RATE] IN BLOOD: 11 MM/HR — SIGNIFICANT CHANGE UP
GLUCOSE SERPL-MCNC: 110 MG/DL — HIGH (ref 70–99)
GLUCOSE SERPL-MCNC: 186 MG/DL — HIGH (ref 70–99)
HCT VFR BLD CALC: 41.3 % — SIGNIFICANT CHANGE UP (ref 39–50)
HCT VFR BLD CALC: 47.7 % — SIGNIFICANT CHANGE UP (ref 39–50)
HGB BLD-MCNC: 13.5 G/DL — SIGNIFICANT CHANGE UP (ref 13–17)
HGB BLD-MCNC: 15.7 G/DL — SIGNIFICANT CHANGE UP (ref 13–17)
LACTATE SERPL-SCNC: 4.3 MMOL/L — CRITICAL HIGH (ref 0.5–2)
LYMPHOCYTES # BLD AUTO: 20 % — SIGNIFICANT CHANGE UP (ref 13–44)
LYMPHOCYTES # BLD AUTO: 38.6 % — SIGNIFICANT CHANGE UP (ref 13–44)
MCHC RBC-ENTMCNC: 28.4 PG — SIGNIFICANT CHANGE UP (ref 27–34)
MCHC RBC-ENTMCNC: 28.7 PG — SIGNIFICANT CHANGE UP (ref 27–34)
MCHC RBC-ENTMCNC: 32.7 G/DL — SIGNIFICANT CHANGE UP (ref 32–36)
MCHC RBC-ENTMCNC: 32.9 G/DL — SIGNIFICANT CHANGE UP (ref 32–36)
MCV RBC AUTO: 86.3 FL — SIGNIFICANT CHANGE UP (ref 80–100)
MCV RBC AUTO: 87.7 FL — SIGNIFICANT CHANGE UP (ref 80–100)
MONOCYTES NFR BLD AUTO: 12.2 % — SIGNIFICANT CHANGE UP (ref 2–14)
MONOCYTES NFR BLD AUTO: 9.3 % — SIGNIFICANT CHANGE UP (ref 2–14)
NEUTROPHILS NFR BLD AUTO: 46.2 % — SIGNIFICANT CHANGE UP (ref 43–77)
NEUTROPHILS NFR BLD AUTO: 60.4 % — SIGNIFICANT CHANGE UP (ref 43–77)
PLATELET # BLD AUTO: 268 K/UL — SIGNIFICANT CHANGE UP (ref 150–400)
PLATELET # BLD AUTO: 387 K/UL — SIGNIFICANT CHANGE UP (ref 150–400)
POTASSIUM SERPL-MCNC: 4.2 MMOL/L — SIGNIFICANT CHANGE UP (ref 3.5–5.3)
POTASSIUM SERPL-MCNC: 4.4 MMOL/L — SIGNIFICANT CHANGE UP (ref 3.5–5.3)
POTASSIUM SERPL-SCNC: 4.2 MMOL/L — SIGNIFICANT CHANGE UP (ref 3.5–5.3)
POTASSIUM SERPL-SCNC: 4.4 MMOL/L — SIGNIFICANT CHANGE UP (ref 3.5–5.3)
PROT SERPL-MCNC: 7.3 G/DL — SIGNIFICANT CHANGE UP (ref 6–8.3)
RBC # BLD: 4.71 M/UL — SIGNIFICANT CHANGE UP (ref 4.2–5.8)
RBC # BLD: 5.53 M/UL — SIGNIFICANT CHANGE UP (ref 4.2–5.8)
RBC # FLD: 14 % — SIGNIFICANT CHANGE UP (ref 10.3–16.9)
RBC # FLD: 14.1 % — SIGNIFICANT CHANGE UP (ref 10.3–16.9)
SODIUM SERPL-SCNC: 136 MMOL/L — SIGNIFICANT CHANGE UP (ref 135–145)
SODIUM SERPL-SCNC: 142 MMOL/L — SIGNIFICANT CHANGE UP (ref 135–145)
WBC # BLD: 6.4 K/UL — SIGNIFICANT CHANGE UP (ref 3.8–10.5)
WBC # BLD: 7 K/UL — SIGNIFICANT CHANGE UP (ref 3.8–10.5)
WBC # FLD AUTO: 6.4 K/UL — SIGNIFICANT CHANGE UP (ref 3.8–10.5)
WBC # FLD AUTO: 7 K/UL — SIGNIFICANT CHANGE UP (ref 3.8–10.5)

## 2018-12-31 PROCEDURE — 99223 1ST HOSP IP/OBS HIGH 75: CPT | Mod: GC

## 2018-12-31 PROCEDURE — 96374 THER/PROPH/DIAG INJ IV PUSH: CPT

## 2018-12-31 PROCEDURE — 99284 EMERGENCY DEPT VISIT MOD MDM: CPT | Mod: 25

## 2018-12-31 PROCEDURE — 80048 BASIC METABOLIC PNL TOTAL CA: CPT

## 2018-12-31 PROCEDURE — 73562 X-RAY EXAM OF KNEE 3: CPT | Mod: 26,LT

## 2018-12-31 PROCEDURE — 86140 C-REACTIVE PROTEIN: CPT

## 2018-12-31 PROCEDURE — 71045 X-RAY EXAM CHEST 1 VIEW: CPT | Mod: 26

## 2018-12-31 PROCEDURE — 99214 OFFICE O/P EST MOD 30 MIN: CPT | Mod: GC

## 2018-12-31 PROCEDURE — 85025 COMPLETE CBC W/AUTO DIFF WBC: CPT

## 2018-12-31 PROCEDURE — 99284 EMERGENCY DEPT VISIT MOD MDM: CPT

## 2018-12-31 PROCEDURE — 99285 EMERGENCY DEPT VISIT HI MDM: CPT

## 2018-12-31 PROCEDURE — 85652 RBC SED RATE AUTOMATED: CPT

## 2018-12-31 PROCEDURE — 73562 X-RAY EXAM OF KNEE 3: CPT

## 2018-12-31 RX ORDER — SODIUM CHLORIDE 9 MG/ML
1000 INJECTION INTRAMUSCULAR; INTRAVENOUS; SUBCUTANEOUS ONCE
Qty: 0 | Refills: 0 | Status: COMPLETED | OUTPATIENT
Start: 2018-12-31 | End: 2018-12-31

## 2018-12-31 RX ORDER — DIPHENHYDRAMINE HCL 50 MG
50 CAPSULE ORAL ONCE
Qty: 0 | Refills: 0 | Status: COMPLETED | OUTPATIENT
Start: 2018-12-31 | End: 2018-12-31

## 2018-12-31 RX ORDER — EPINEPHRINE 0.3 MG/.3ML
0.3 INJECTION INTRAMUSCULAR; SUBCUTANEOUS ONCE
Qty: 0 | Refills: 0 | Status: COMPLETED | OUTPATIENT
Start: 2018-12-31 | End: 2018-12-31

## 2018-12-31 RX ORDER — FAMOTIDINE 10 MG/ML
20 INJECTION INTRAVENOUS ONCE
Qty: 0 | Refills: 0 | Status: COMPLETED | OUTPATIENT
Start: 2018-12-31 | End: 2018-12-31

## 2018-12-31 RX ADMIN — FAMOTIDINE 20 MILLIGRAM(S): 10 INJECTION INTRAVENOUS at 19:10

## 2018-12-31 RX ADMIN — SODIUM CHLORIDE 2000 MILLILITER(S): 9 INJECTION INTRAMUSCULAR; INTRAVENOUS; SUBCUTANEOUS at 19:48

## 2018-12-31 RX ADMIN — Medication 125 MILLIGRAM(S): at 19:10

## 2018-12-31 RX ADMIN — Medication 100 MILLIGRAM(S): at 16:37

## 2018-12-31 RX ADMIN — SODIUM CHLORIDE 2000 MILLILITER(S): 9 INJECTION INTRAMUSCULAR; INTRAVENOUS; SUBCUTANEOUS at 19:10

## 2018-12-31 RX ADMIN — Medication 50 MILLIGRAM(S): at 19:10

## 2018-12-31 RX ADMIN — EPINEPHRINE 0.3 MILLIGRAM(S): 0.3 INJECTION INTRAMUSCULAR; SUBCUTANEOUS at 19:10

## 2018-12-31 NOTE — H&P ADULT - PROBLEM SELECTOR PLAN 4
-last known AIC in August 6.7  -takes metformin 1000bid, Levemir 33u and Novolog 12u tid  -c/w sliding scale  -monitor FS -s/p bendryl 50iv, epinephrine 0.e, pepcid 20 and solumedrol 125 in the ED  -patient appears comfortable, no increased work of breathing, protecting airway  -has b/l UE erythema   -c/w monitor -likely 2/2 increased work of breathing in the setting of allergic reaction  -other ddx: severe sepsis  -f/u blood culture  -s/p 2L NC -likely 2/2 increased work of breathing in the setting of allergic reaction  -other ddx: severe sepsis vs DKA  -f/u blood culture  -s/p 2L NC

## 2018-12-31 NOTE — ED ADULT TRIAGE NOTE - CHIEF COMPLAINT QUOTE
Patient states "I am having an allergic reaction."  Patient complaining of SOB, tremors and diffuse rash after taking PO antiobiotics from [facility] ED approximately 30 minutes ago.  Patient upgraded to MD Grady, taken to Res[facility] room.

## 2018-12-31 NOTE — H&P ADULT - FAMILY HISTORY
No pertinent family history in first degree relatives Father  Still living? Unknown  Family history of MI (myocardial infarction), Age at diagnosis: Age Unknown

## 2018-12-31 NOTE — H&P ADULT - PROBLEM SELECTOR PROBLEM 6
Depression, unspecified depression type Type 2 diabetes mellitus with complication, unspecified whether long term insulin use

## 2018-12-31 NOTE — H&P ADULT - PROBLEM SELECTOR PLAN 7
-hx of ulcerative colitis and s/p removal of partial sigmoidectomy with abscess  -c/w balsalazide 750 tid -hx of ulcerative colitis and s/p removal of partial sigmoidectomy with abscess  -c/w balsalazide 750 tid    #Ventral Hernia  -need outpatient follow-up

## 2018-12-31 NOTE — ED ADULT TRIAGE NOTE - CHIEF COMPLAINT QUOTE
"I had a knee injury and my doctor saw me today and told me that I need to have blood work, maybe a CT scan because he thinks my left knee may be infected.

## 2018-12-31 NOTE — H&P ADULT - NSHPLABSRESULTS_GEN_ALL_CORE
LABS:                         15.7   6.4   )-----------( 387      ( 31 Dec 2018 19:24 )             47.7     12-31    142  |  102  |  17  ----------------------------<  186<H>  4.4   |  19<L>  |  1.18    Ca    9.0      31 Dec 2018 19:24    TPro  7.3  /  Alb  4.0  /  TBili  0.4  /  DBili  x   /  AST  28  /  ALT  19  /  AlkPhos  68  12-31              Lactate, Blood: 2.0 mmoL/L (12-31 @ 20:56)  Lactate, Blood: 4.3 mmoL/L (12-31 @ 19:24)      RADIOLOGY, EKG & ADDITIONAL TESTS: Reviewed. LABS:                         15.7   6.4   )-----------( 387      ( 31 Dec 2018 19:24 )             47.7     12-31    142  |  102  |  17  ----------------------------<  186<H>  4.4   |  19<L>  |  1.18    Ca    9.0      31 Dec 2018 19:24    TPro  7.3  /  Alb  4.0  /  TBili  0.4  /  DBili  x   /  AST  28  /  ALT  19  /  AlkPhos  68  12-31              Lactate, Blood: 2.0 mmoL/L (12-31 @ 20:56)  Lactate, Blood: 4.3 mmoL/L (12-31 @ 19:24)      RADIOLOGY, EKG & ADDITIONAL TESTS: Reviewed.    EKG: NSR 98, possible LAD with LAFB and septal infarct, age indeterminate

## 2018-12-31 NOTE — ED PROVIDER NOTE - OBJECTIVE STATEMENT
64 y/o M w/hx HTN, HLD, IDDM, Asthma on advair/ventolin, large chronic L sided abd wall hernia, seen earlier today at [facility] ED for poorly healing R anterior shin/knee wound/cellulitis w/following note: here with left knee wound, poorly healing after fall a little over a month ago.  Notes he had a lot of swelling initially, with a wound that was deep but scabbed over eventually.  Scab then fell off and a wound was left in the anterior knee that intermittently drained some fluid.  Denies fever.  Ambulating normally.  Normal rom of knee.  Saw his doctor today who referred to the ED for further eval. --- Pt was discharged on clindamycin as pt is pen allergic, for mild cellulitis. After taking 1 dose, pt became flushed w/itchy raised red rash, difficulty breathing, and feeling as though his throat may close. No n/v or confusion. No visual changes. Took 2 puffs of albuterol inhaler w/out improvement and returned to ED.

## 2018-12-31 NOTE — H&P ADULT - PROBLEM SELECTOR PLAN 2
-s/p bendryl 50iv, epinephrine 0.e, pepcid 20 and solumedrol 125 in the ED  -patient appears comfortable, no increased work of breathing, protecting airway  -has b/l UE erythema   -c/w monitor -    #LLE edema  -unilateral peripheral edema likely 2/2 PVD  -other ddx: CHF - low suspicion -presenting with R knee eschar - no purulence noted  -allergic to PCN and clindamycin  -will start vancomycin 1g q8 (spoke to Dr. Adams)  -f/u vancomycin trough  -f/u wound consult    #LLE edema  -unilateral peripheral edema likely 2/2 PVD and recent fall  -other ddx: CHF - patient has a systolic murmur, will need echocardiogram in the outpatient setting -presenting with R knee eschar - no purulence noted  -allergic to PCN and clindamycin  -will start vancomycin 1.5g q12 (spoke to Dr. Adams)  -f/u vancomycin trough  -f/u wound consult    #LLE edema  -unilateral peripheral edema likely 2/2 PVD and recent fall  -other ddx: CHF - patient has a systolic murmur, will need echocardiogram in the outpatient setting

## 2018-12-31 NOTE — H&P ADULT - ASSESSMENT
62 y/o male with h/o HTN, HLD, IDDM, Asthma on advair/ventolin, large chronic L sided abd wall hernia, discharged from ED earlier for poorly healing R anterior knee wound presenting with allergic reaction to clindamycin.

## 2018-12-31 NOTE — H&P ADULT - NSHPSOCIALHISTORY_GEN_ALL_CORE
Lives alone, doesn't use cane or walker. Denies EtOH, smoking or recreational drug use. Lives alone, doesn't use cane or walker. Denies EtOH, smoking or recreational drug use. Pt is a retired .

## 2018-12-31 NOTE — ED ADULT NURSE NOTE - CHIEF COMPLAINT QUOTE
Patient states "I am having an allergic reaction."  Patient complaining of SOB, tremors and diffuse rash after taking PO antiobiotics from [facility] ED approximately 30 minutes ago.  Patient upgraded to MD Grady, taken to Resus room.

## 2018-12-31 NOTE — H&P ADULT - HISTORY OF PRESENT ILLNESS
62 y/o male with h/o HTN, HLD, IDDM, Asthma on advair/ventolin, large chronic L sided abd wall hernia, discharged from ED earlier for poorly healing R anterior knee wound presenting with allergic reaction for few hours. Pt had a fall     itis w/following note: here with left knee wound, poorly healing after fall a little over a month ago.  Notes he had a lot of swelling initially, with a wound that was deep but scabbed over eventually.  Scab then fell off and a wound was left in the anterior knee that intermittently drained some fluid.  Denies fever.  Ambulating normally.  Normal rom of knee.  Saw his doctor today who referred to the ED for further eval. --- Pt was discharged on clindamycin as pt is pen allergic, for mild cellulitis. After taking 1 dose, pt became flushed w/itchy raised red rash, difficulty breathing, and feeling as though his throat may close. No n/v or confusion. No visual changes. Took 2 puffs of albuterol inhaler w/out improvement and returned to ED.    In the ED, vitals T98.1, P92, /64, R18, saturating 95% 3L NC. 64 y/o male with h/o HTN, HLD, IDDM, Asthma on advair/ventolin, large chronic L sided abd wall hernia, discharged from ED earlier for poorly healing R anterior knee wound presenting with allergic reaction for few hours. Pt had a fall on a snowy day about 1 week before Thanksgiving, fell on his left and developed abrasion. Pt tried using bacitracin without any improvement in healing. Earlier today, he went to see his PCP (Dr. Min/Julian Gibson) who noticed eschar over his R knee and advised him to go to ED for possible debridement. When patient visited [facility] ED, patient was given IV clindamycin 600 and discharged on PO clindamycin. About an hour after having IV clindamycin, patient developed diffuse upper extremity erythema and SOB. Patient subsequently returned back to the ED.     In the ED, vitals T98.1, P135, R24, /64, saturating 95% 3L NC. Pt received benadryl 50IV, epinephrine 0.3, Pepcid 20, solumedrol 125, NS 2L.     ROS: No fever, chills, SOB, palpitations, n/v or diarrhea. Exercise tolerance is two flights at baseline. Has LLE edema since the fall.

## 2018-12-31 NOTE — ED ADULT NURSE NOTE - NSIMPLEMENTINTERV_GEN_ALL_ED
Implemented All Universal Safety Interventions:  [place] to call system. Call bell, personal items and telephone within reach. Instruct patient to call for assistance. Room bathroom lighting operational. Non-slip footwear when patient is off stretcher. Physically safe environment: no spills, clutter or unnecessary equipment. Stretcher in lowest position, wheels locked, appropriate side rails in place.

## 2018-12-31 NOTE — ED PROVIDER NOTE - MEDICAL DECISION MAKING DETAILS
+ LLE cellulitis w/out meeting sepsis criteria, anaphylactic reaction to clindamycin. Given pt's persistent infection and questionable allergies to now several denominations of antibiotic, will admit for further treatment of anaphylactic sx, holding abx therapy pending ID evaluation. + LLE cellulitis w/out meeting sepsis criteria, anaphylactic reaction to clindamycin. Given pt's persistent infection and questionable allergies to now several denominations of antibiotic, will admit for further treatment of anaphylactic sx, holding abx therapy pending ID evaluation. After 4hr observation period in ED, pt not exhibiting continued sx of anaphylaxis.

## 2018-12-31 NOTE — H&P ADULT - PROBLEM SELECTOR PROBLEM 4
Type 2 diabetes mellitus with complication, unspecified whether long term insulin use Allergic reaction to drug, initial encounter Lactic acidosis

## 2018-12-31 NOTE — ED ADULT NURSE NOTE - OBJECTIVE STATEMENT
64 y/o male c/o allergic reaction s/p taking clindamycin here at [facility] about one hour ago for LLE wound/cellulitis. pt became flushed w/itchy raised red rash, difficulty breathing, and feeling as though his throat may close. No n/v or confusion. No visual changes. Took 2 puffs of albuterol inhaler w/out improvement and returned to ED. pt upgraded to MD Grady, medicated as prescribed, EKG completed, placed on continuous cardiac monitor. VSS with improvement, airway patent, o2 sat 95% on room air. pt a&ox4, speaking in full sentences, diaphoretic. open wound noted just below left knee cap. Large chronic  L sided abdominal hernia. pt reports feeling better after medication administration.

## 2018-12-31 NOTE — ED PROVIDER NOTE - MUSCULOSKELETAL, MLM
left knee with 1x2 cm prepatellar wound, not able to express pus or probe to bone.  mild surrounding swelling/ erythema without fluctuance.  normal ROM at knee joint without pain.  No focal bony tenderness

## 2018-12-31 NOTE — ED ADULT NURSE NOTE - OBJECTIVE STATEMENT
pt fell in early Nov ,injury to left knee , had a laceration , left knee wound appears infected x past few days with exudate and surrounding redness , no fever now but pt states that he had chills last week

## 2018-12-31 NOTE — ED PROVIDER NOTE - PHYSICAL EXAMINATION
VITAL SIGNS: I have reviewed nursing notes and confirm.  CONSTITUTIONAL: Well-developed; well-nourished overweight male diaphoretic with whole body urticarial rash/flushing w/mild air hunger though speaking calmly in complete sentences, + moderate distress.  SKIN: Agree with RN documentation regarding decubitus evaluation. Remainder of skin exam is flushed, blanching erythematous maculopapular rash and diaphoretic across entire body  HEAD: Normocephalic; atraumatic.  EYES: PERRL, EOM intact; conjunctiva and sclera clear.  ENT: No nasal discharge; airway clear.  NECK: Supple; non tender.  CARD: S1, S2 normal; no murmurs, gallops, or rubs. + tachycardic regular rhythm  RESP: + diffuse expiratory wheeze all lung fields, good excursion, + tachypnea w/out accessory muscle recruitment  ABD: Normal bowel sounds; soft; non-distended; non-tender; + large L sided soft abd wall hernia  EXT: Normal ROM. No clubbing, cyanosis or edema.  LYMPH: No acute cervical adenopathy.  NEURO: Alert, oriented. Grossly unremarkable.  PSYCH: Cooperative, appropriate.

## 2018-12-31 NOTE — ED ADULT NURSE NOTE - EXTENSIONS OF SELF_ADULT
Detail Level: Simple Plan: Pt was given triamcinolone cream and miconazole cream, helped some.   By dermatitis up [facility] Regimen: Gave handout for dry skin creams None

## 2018-12-31 NOTE — H&P ADULT - PROBLEM SELECTOR PLAN 6
-hx of depression  -takes buproprion 150mg XL qd, duloxetine 30mg qd  -c/w home meds -last known AIC in August 6.7  -takes metformin 1000bid, Levemir 33u and Novolog 12u tid  -c/w sliding scale  -monitor FS -last known AIC in August 6.7, follows with Dr. Llamas ([facility])  -takes metformin 1000bid, Levemir 33u and Novolog 12u tid  -c/w sliding scale  -monitor FS  -if FS>200, consider dosing NPH in the AM

## 2018-12-31 NOTE — H&P ADULT - PROBLEM SELECTOR PLAN 3
-hx of HTN  -c/w quinapril 40qd (home med) -likely 2/2 increased work of breathing in the setting of allergic reaction  -other ddx: severe sepsis  -f/u blood culture  -s/p 3L NC -Although patient meets severe sepsis criteria given P125, R24 and lactic acidosis with source of cellulitis, his tachycardia, tachypnea and lactic acidosis from tachypnea are from allergic reaction to clindamycin   -s/p 2L  -reperfusion exam completed

## 2018-12-31 NOTE — ED ADULT NURSE NOTE - NSIMPLEMENTINTERV_GEN_ALL_ED
Implemented All Universal Safety Interventions:  [place] to call system. Call bell, personal items and telephone within reach. Instruct patient to call for assistance. Room bathroom lighting operational. Non-slip footwear when patient is off stretcher. Physically safe environment: no spills, clutter or unnecessary equipment. Stretcher in lowest position, wheels locked, appropriate side rails in place.

## 2018-12-31 NOTE — H&P ADULT - PROBLEM SELECTOR PLAN 5
-c/w Lipitor 40qd ([facility]) -hx of HTN  -c/w quinapril 40qd (home med)    #HLD  -c/w Lipitor 40qd (home med)    #Depression   -hx of depression  -takes buproprion 150mg XL qd, duloxetine 30mg qd  -c/w home meds

## 2018-12-31 NOTE — ED PROVIDER NOTE - MEDICAL DECISION MAKING DETAILS
poorly healing knee wound after trauma.  does not appear septic joint as normal gait/ rom without pain and no fever.  no palpable fluctuance to suggest underlying abscess but does appear mildly cellulitic.  will treat with clinidamycin (pcn allergic).  labs with normal wbc.  xray without fracture or bony involvement

## 2018-12-31 NOTE — ED PROVIDER NOTE - CARE PLAN
Principal Discharge DX:	Anaphylaxis, initial encounter Principal Discharge DX:	Anaphylaxis, initial encounter  Secondary Diagnosis:	Cellulitis of left lower extremity

## 2018-12-31 NOTE — ED PROVIDER NOTE - NSFOLLOWUPINSTRUCTIONS_ED_ALL_ED_FT
Please see you primary care provider in 24-48 hours.  Return to the ER if symptoms worsen or other concerns. (fever, increased pain or swelling)    Wound Infection    WHAT YOU NEED TO KNOW:    A wound infection occurs when bacteria enters a break in the skin. The infection may involve just the skin, or affect deeper tissues or organs close to the wound.     DISCHARGE INSTRUCTIONS:    Return to the emergency department if:     You feel short of breath.       Your heart is beating faster than usual.       You feel confused.       Blood soaks through your bandages.      Your wound comes apart or feels like it is ripping.       You have severe pain.      You see red streaks coming from the infected area.    Contact your healthcare provider if:     You have a fever or chills.       You have more pain, redness, or swelling near your wound.      Your symptoms do not improve.       The skin around your wound feels numb.      You have questions or concerns about your condition or care.    Medicines: You may need any of the following:     NSAIDs, such as ibuprofen, help decrease swelling, pain, and fever. This medicine is available with or without a doctor's order. NSAIDs can cause stomach bleeding or kidney problems in certain people. If you take blood thinner medicine, always ask your healthcare provider if NSAIDs are safe for you. Always read the medicine label and follow directions.      Antibiotics help treat a bacterial infection.       Take your medicine as directed. Contact your healthcare provider if you think your medicine is not helping or if you have side effects. Tell him or her if you are allergic to any medicine. Keep a list of the medicines, vitamins, and herbs you take. Include the amounts, and when and why you take them. Bring the list or the pill bottles to follow-up visits. Carry your medicine list with you in case of an emergency.    Care for your wound as directed: Keep your wound clean and dry. You may need to cover your wound when you bathe so it does not get wet. Clean your wound as directed with soap and water or wound . Put on new, clean bandages as directed. Change your bandages when they get wet or dirty.    Help your wound heal:     Eat a variety of healthy foods. Examples include fruits, vegetables, whole-grain breads, low-fat dairy products, beans, lean meats, and fish. Healthy foods may help you heal faster. You may also need to take vitamins and minerals. Ask if you need to be on a special diet.      Manage other health conditions. Follow your healthcare provider's directions to manage health conditions that can cause slow wound healing. Examples include high blood pressure and diabetes.      Do not smoke. Nicotine and other chemicals in cigarettes and cigars can cause slow wound healing. Ask your healthcare provider for information if you currently smoke and need help to quit. E-cigarettes or smokeless tobacco still contain nicotine. Talk to your healthcare provider before you use these products.     Follow up with your healthcare provider in 1 to 2 days: Write down your questions so you remember to ask them during your visits.        Cellulitis    Cellulitis is a skin infection caused by bacteria. This condition occurs most often in the arms and lower legs but can occur anywhere over the body. Symptoms include redness, swelling, warm skin, tenderness, and chills/fever. If you were prescribed an antibiotic medicine, take it as told by your health care provider. Do not stop taking the antibiotic even if you start to feel better.    SEEK IMMEDIATE MEDICAL CARE IF YOU HAVE ANY OF THE FOLLOWING SYMPTOMS: worsening fever, red streaks coming from affected area, vomiting or diarrhea, or dizziness/lightheadedness.

## 2018-12-31 NOTE — H&P ADULT - PROBLEM SELECTOR PLAN 1
-meets severe sepsis criteria given P125, R24 and lactic acidosis with source of cellulitis. However, tachycardia, tachypnea and lactic acidosis from tachypnea can also be from allergic reaction to clindamycin.   -s/p 2L  -reperfusion exam completed -s/p bendryl 50iv, epinephrine 0.e, pepcid 20 and solumedrol 125 in the ED  -patient appears comfortable, no increased work of breathing, protecting airway  -has b/l UE erythema which is improving    -c/w monitor

## 2018-12-31 NOTE — H&P ADULT - NSHPPHYSICALEXAM_GEN_ALL_CORE
VITAL SIGNS:  T(C): 36.7 (12-31-18 @ 23:18), Max: 37.3 (12-31-18 @ 15:54)  T(F): 98.1 (12-31-18 @ 23:18), Max: 99.1 (12-31-18 @ 15:54)  HR: 84 (12-31-18 @ 23:18) (75 - 135)  BP: 134/71 (12-31-18 @ 23:18) (102/66 - 171/89)  BP(mean): --  RR: 18 (12-31-18 @ 23:18) (18 - 24)  SpO2: 98% (12-31-18 @ 23:18) (95% - 98%)  Wt(kg): --    PHYSICAL EXAM:    Constitutional: WDWN resting comfortably in bed; NAD  Head: NC/AT  Eyes: PERRL, EOMI, anicteric sclera  ENT: no nasal discharge; uvula midline, no oropharyngeal erythema or exudates; MMM  Neck: supple; no JVD or thyromegaly  Respiratory: CTA B/L; no W/R/R, no retractions  Cardiac: +S1/S2; RRR; no M/R/G; PMI non-displaced  Gastrointestinal: abdomen soft, NT/ND; no rebound or guarding; +BSx4  Genitourinary: normal external genitalia  Back: spine midline, no bony tenderness or step-offs; no CVAT B/L  Extremities: WWP, no clubbing or cyanosis; no peripheral edema  Musculoskeletal: NROM x4; no joint swelling, tenderness or erythema  Vascular: 2+ radial, femoral, DP/PT pulses B/L  Dermatologic: skin warm, dry and intact; no rashes, wounds, or scars  Lymphatic: no submandibular or cervical LAD  Neurologic: AAOx3; CNII-XII grossly intact; no focal deficits  Psychiatric: affect and characteristics of appearance, verbalizations, behaviors are appropriate VITAL SIGNS:  T(C): 36.7 (12-31-18 @ 23:18), Max: 37.3 (12-31-18 @ 15:54)  T(F): 98.1 (12-31-18 @ 23:18), Max: 99.1 (12-31-18 @ 15:54)  HR: 84 (12-31-18 @ 23:18) (75 - 135)  BP: 134/71 (12-31-18 @ 23:18) (102/66 - 171/89)  BP(mean): --  RR: 18 (12-31-18 @ 23:18) (18 - 24)  SpO2: 98% (12-31-18 @ 23:18) (95% - 98%)  Wt(kg): --    PHYSICAL EXAM:    Constitutional: WDWN resting comfortably in bed; NAD  HEENT: NC/AT, PERRL, EOMI, anicteric sclera, MMM, no JVD  Respiratory: good air entry b/l  Cardiac: +S1/S2, 3/6 systolic murmur radiating to carotids; RRR   Gastrointestinal: abdomen soft, NT/Difffusely distended; no rebound or guarding; +BSx4  Genitourinary: no quispe  Extremities: 1+ peripheral edema L>R, L knee has black eschar without purulent discharge - mild erythema around left ankle  Neurologic: AAOx3; CNII-XII grossly intact; no focal deficits VITAL SIGNS:  T(C): 36.7 (12-31-18 @ 23:18), Max: 37.3 (12-31-18 @ 15:54)  T(F): 98.1 (12-31-18 @ 23:18), Max: 99.1 (12-31-18 @ 15:54)  HR: 84 (12-31-18 @ 23:18) (75 - 135)  BP: 134/71 (12-31-18 @ 23:18) (102/66 - 171/89)  BP(mean): --  RR: 18 (12-31-18 @ 23:18) (18 - 24)  SpO2: 98% (12-31-18 @ 23:18) (95% - 98%)  Wt(kg): --    PHYSICAL EXAM:    Constitutional: WDWN resting comfortably in bed; NAD  HEENT: NC/AT, PERRL, EOMI, anicteric sclera, MMM, no JVD  Respiratory: good air entry b/l  Cardiac: +S1/S2, 3/6 systolic murmur radiating to carotids; RRR   Gastrointestinal: abdomen soft, NT/Difffusely distended with ventral hernia; no rebound or guarding; +BSx4  Genitourinary: no quispe  Extremities: 1+ peripheral edema L>R, L knee has black eschar without purulent discharge - mild erythema around left ankle  Neurologic: AAOx3; CNII-XII grossly intact; no focal deficits VITAL SIGNS:  T(C): 36.7 (12-31-18 @ 23:18), Max: 37.3 (12-31-18 @ 15:54)  T(F): 98.1 (12-31-18 @ 23:18), Max: 99.1 (12-31-18 @ 15:54)  HR: 84 (12-31-18 @ 23:18) (75 - 135)  BP: 134/71 (12-31-18 @ 23:18) (102/66 - 171/89)  BP(mean): --  RR: 18 (12-31-18 @ 23:18) (18 - 24)  SpO2: 98% (12-31-18 @ 23:18) (95% - 98%)  Wt(kg): --    PHYSICAL EXAM:    Constitutional: WDWN resting comfortably in bed; NAD  HEENT: NC/AT, PERRL, EOMI, anicteric sclera, MMM, no JVD  Respiratory: good air entry b/l  Cardiac: +S1/S2, 3/6 systolic murmur radiating to carotids; RRR   Gastrointestinal: abdomen soft, NT/Difffusely distended with ventral hernia; no rebound or guarding; +BSx4  Genitourinary: no quispe  Extremities: 1+ peripheral edema L>R, L knee has black eschar without purulent discharge - mild erythema around left ankle  Neurologic: AAOx3; CNII-XII grossly intact; no focal deficits  Psych: normal affect  Skin: L knee has black eschar without purulent discharge - mild erythema around left ankle

## 2018-12-31 NOTE — ED PROVIDER NOTE - SKIN, MLM
Skin normal color for race, warm, dry and intact. arms and trunk with faint erythematous rash with overlying excoriations

## 2018-12-31 NOTE — ED PROVIDER NOTE - OBJECTIVE STATEMENT
here with left knee wound, poorly healing after fall a little over a month ago.  Notes he had a lot of swelling initially, with a wound that was deep but scabbed over eventually.  Scab then fell off and a wound was left in the anterior knee that intermittently drained some fluid.  Denies fever.  Ambulating normally.  Normal rom of knee.  Saw his doctor today who referred to the ED for further eval.  History of DM but says his sugars have been well controlled lately

## 2019-01-01 ENCOUNTER — TRANSCRIPTION ENCOUNTER (OUTPATIENT)
Age: 64
End: 2019-01-01

## 2019-01-01 VITALS
HEART RATE: 81 BPM | DIASTOLIC BLOOD PRESSURE: 70 MMHG | TEMPERATURE: 99 F | SYSTOLIC BLOOD PRESSURE: 136 MMHG | RESPIRATION RATE: 16 BRPM | OXYGEN SATURATION: 95 %

## 2019-01-01 DIAGNOSIS — L03.116 CELLULITIS OF LEFT LOWER LIMB: ICD-10-CM

## 2019-01-01 DIAGNOSIS — T78.40XA ALLERGY, UNSPECIFIED, INITIAL ENCOUNTER: ICD-10-CM

## 2019-01-01 DIAGNOSIS — R65.10 SYSTEMIC INFLAMMATORY RESPONSE SYNDROME (SIRS) OF NON-INFECTIOUS ORIGIN WITHOUT ACUTE ORGAN DYSFUNCTION: ICD-10-CM

## 2019-01-01 DIAGNOSIS — J45.909 UNSPECIFIED ASTHMA, UNCOMPLICATED: ICD-10-CM

## 2019-01-01 DIAGNOSIS — A41.9 SEPSIS, UNSPECIFIED ORGANISM: ICD-10-CM

## 2019-01-01 DIAGNOSIS — E78.5 HYPERLIPIDEMIA, UNSPECIFIED: ICD-10-CM

## 2019-01-01 DIAGNOSIS — Z91.89 OTHER SPECIFIED PERSONAL RISK FACTORS, NOT ELSEWHERE CLASSIFIED: ICD-10-CM

## 2019-01-01 DIAGNOSIS — F32.9 MAJOR DEPRESSIVE DISORDER, SINGLE EPISODE, UNSPECIFIED: ICD-10-CM

## 2019-01-01 DIAGNOSIS — E11.8 TYPE 2 DIABETES MELLITUS WITH UNSPECIFIED COMPLICATIONS: ICD-10-CM

## 2019-01-01 DIAGNOSIS — E87.2 ACIDOSIS: ICD-10-CM

## 2019-01-01 DIAGNOSIS — Z29.9 ENCOUNTER FOR PROPHYLACTIC MEASURES, UNSPECIFIED: ICD-10-CM

## 2019-01-01 DIAGNOSIS — K51.919 ULCERATIVE COLITIS, UNSPECIFIED WITH UNSPECIFIED COMPLICATIONS: ICD-10-CM

## 2019-01-01 DIAGNOSIS — I10 ESSENTIAL (PRIMARY) HYPERTENSION: ICD-10-CM

## 2019-01-01 LAB
ANION GAP SERPL CALC-SCNC: 12 MMOL/L — SIGNIFICANT CHANGE UP (ref 5–17)
BUN SERPL-MCNC: 18 MG/DL — SIGNIFICANT CHANGE UP (ref 7–23)
CALCIUM SERPL-MCNC: 8.5 MG/DL — SIGNIFICANT CHANGE UP (ref 8.4–10.5)
CHLORIDE SERPL-SCNC: 105 MMOL/L — SIGNIFICANT CHANGE UP (ref 96–108)
CO2 SERPL-SCNC: 24 MMOL/L — SIGNIFICANT CHANGE UP (ref 22–31)
CREAT SERPL-MCNC: 1.07 MG/DL — SIGNIFICANT CHANGE UP (ref 0.5–1.3)
GLUCOSE BLDC GLUCOMTR-MCNC: 114 MG/DL — HIGH (ref 70–99)
GLUCOSE BLDC GLUCOMTR-MCNC: 120 MG/DL — HIGH (ref 70–99)
GLUCOSE BLDC GLUCOMTR-MCNC: 140 MG/DL — HIGH (ref 70–99)
GLUCOSE SERPL-MCNC: 173 MG/DL — HIGH (ref 70–99)
HBA1C BLD-MCNC: 5.8 % — HIGH (ref 4–5.6)
LACTATE SERPL-SCNC: 1.1 MMOL/L — SIGNIFICANT CHANGE UP (ref 0.5–2)
MAGNESIUM SERPL-MCNC: 1.9 MG/DL — SIGNIFICANT CHANGE UP (ref 1.6–2.6)
POTASSIUM SERPL-MCNC: 4.7 MMOL/L — SIGNIFICANT CHANGE UP (ref 3.5–5.3)
POTASSIUM SERPL-SCNC: 4.7 MMOL/L — SIGNIFICANT CHANGE UP (ref 3.5–5.3)
SODIUM SERPL-SCNC: 141 MMOL/L — SIGNIFICANT CHANGE UP (ref 135–145)

## 2019-01-01 PROCEDURE — 93971 EXTREMITY STUDY: CPT | Mod: 26,LT

## 2019-01-01 PROCEDURE — 96375 TX/PRO/DX INJ NEW DRUG ADDON: CPT

## 2019-01-01 PROCEDURE — 96374 THER/PROPH/DIAG INJ IV PUSH: CPT

## 2019-01-01 PROCEDURE — 83036 HEMOGLOBIN GLYCOSYLATED A1C: CPT

## 2019-01-01 PROCEDURE — 96372 THER/PROPH/DIAG INJ SC/IM: CPT | Mod: XU

## 2019-01-01 PROCEDURE — 99285 EMERGENCY DEPT VISIT HI MDM: CPT | Mod: 25

## 2019-01-01 PROCEDURE — 36415 COLL VENOUS BLD VENIPUNCTURE: CPT

## 2019-01-01 PROCEDURE — 80053 COMPREHEN METABOLIC PANEL: CPT

## 2019-01-01 PROCEDURE — 82962 GLUCOSE BLOOD TEST: CPT

## 2019-01-01 PROCEDURE — 94640 AIRWAY INHALATION TREATMENT: CPT

## 2019-01-01 PROCEDURE — 85025 COMPLETE CBC W/AUTO DIFF WBC: CPT

## 2019-01-01 PROCEDURE — 93971 EXTREMITY STUDY: CPT

## 2019-01-01 PROCEDURE — 71045 X-RAY EXAM CHEST 1 VIEW: CPT

## 2019-01-01 PROCEDURE — 80048 BASIC METABOLIC PNL TOTAL CA: CPT

## 2019-01-01 PROCEDURE — 99238 HOSP IP/OBS DSCHRG MGMT 30/<: CPT

## 2019-01-01 PROCEDURE — 83605 ASSAY OF LACTIC ACID: CPT

## 2019-01-01 PROCEDURE — 83735 ASSAY OF MAGNESIUM: CPT

## 2019-01-01 RX ORDER — BUPROPION HYDROCHLORIDE 150 MG/1
150 TABLET, EXTENDED RELEASE ORAL DAILY
Qty: 0 | Refills: 0 | Status: DISCONTINUED | OUTPATIENT
Start: 2019-01-01 | End: 2019-01-01

## 2019-01-01 RX ORDER — ATORVASTATIN CALCIUM 80 MG/1
40 TABLET, FILM COATED ORAL AT BEDTIME
Qty: 0 | Refills: 0 | Status: DISCONTINUED | OUTPATIENT
Start: 2019-01-01 | End: 2019-01-01

## 2019-01-01 RX ORDER — LISINOPRIL 2.5 MG/1
40 TABLET ORAL EVERY 24 HOURS
Qty: 0 | Refills: 0 | Status: DISCONTINUED | OUTPATIENT
Start: 2019-01-01 | End: 2019-01-01

## 2019-01-01 RX ORDER — IPRATROPIUM/ALBUTEROL SULFATE 18-103MCG
3 AEROSOL WITH ADAPTER (GRAM) INHALATION EVERY 6 HOURS
Qty: 0 | Refills: 0 | Status: DISCONTINUED | OUTPATIENT
Start: 2019-01-01 | End: 2019-01-01

## 2019-01-01 RX ORDER — AZTREONAM 2 G
1 VIAL (EA) INJECTION
Qty: 14 | Refills: 0
Start: 2019-01-01 | End: 2019-01-07

## 2019-01-01 RX ORDER — METFORMIN HYDROCHLORIDE 850 MG/1
1 TABLET ORAL
Qty: 0 | Refills: 0 | COMMUNITY

## 2019-01-01 RX ORDER — HEPARIN SODIUM 5000 [USP'U]/ML
7500 INJECTION INTRAVENOUS; SUBCUTANEOUS EVERY 12 HOURS
Qty: 0 | Refills: 0 | Status: DISCONTINUED | OUTPATIENT
Start: 2019-01-01 | End: 2019-01-01

## 2019-01-01 RX ORDER — FAMOTIDINE 10 MG/ML
1 INJECTION INTRAVENOUS
Qty: 4 | Refills: 0
Start: 2019-01-01 | End: 2019-01-02

## 2019-01-01 RX ORDER — BALSALAZIDE DISODIUM 750 MG/1
750 CAPSULE ORAL THREE TIMES A DAY
Qty: 0 | Refills: 0 | Status: DISCONTINUED | OUTPATIENT
Start: 2019-01-01 | End: 2019-01-01

## 2019-01-01 RX ORDER — DULOXETINE HYDROCHLORIDE 30 MG/1
30 CAPSULE, DELAYED RELEASE ORAL DAILY
Qty: 0 | Refills: 0 | Status: DISCONTINUED | OUTPATIENT
Start: 2019-01-01 | End: 2019-01-01

## 2019-01-01 RX ORDER — INSULIN LISPRO 100/ML
VIAL (ML) SUBCUTANEOUS
Qty: 0 | Refills: 0 | Status: DISCONTINUED | OUTPATIENT
Start: 2019-01-01 | End: 2019-01-01

## 2019-01-01 RX ORDER — VANCOMYCIN HCL 1 G
1500 VIAL (EA) INTRAVENOUS EVERY 8 HOURS
Qty: 0 | Refills: 0 | Status: DISCONTINUED | OUTPATIENT
Start: 2019-01-01 | End: 2019-01-01

## 2019-01-01 RX ORDER — QUINAPRIL HYDROCHLORIDE 40 MG/1
1 TABLET, FILM COATED ORAL
Qty: 0 | Refills: 0 | COMMUNITY

## 2019-01-01 RX ORDER — DIPHENHYDRAMINE HCL 50 MG
1 CAPSULE ORAL
Qty: 12 | Refills: 0
Start: 2019-01-01 | End: 2019-01-02

## 2019-01-01 RX ORDER — VANCOMYCIN HCL 1 G
1500 VIAL (EA) INTRAVENOUS EVERY 12 HOURS
Qty: 0 | Refills: 0 | Status: DISCONTINUED | OUTPATIENT
Start: 2019-01-01 | End: 2019-01-01

## 2019-01-01 RX ORDER — BALSALAZIDE DISODIUM 750 MG/1
1 CAPSULE ORAL
Qty: 0 | Refills: 0 | DISCHARGE
Start: 2019-01-01

## 2019-01-01 RX ADMIN — BALSALAZIDE DISODIUM 750 MILLIGRAM(S): 750 CAPSULE ORAL at 17:14

## 2019-01-01 RX ADMIN — BALSALAZIDE DISODIUM 750 MILLIGRAM(S): 750 CAPSULE ORAL at 10:15

## 2019-01-01 RX ADMIN — BUPROPION HYDROCHLORIDE 150 MILLIGRAM(S): 150 TABLET, EXTENDED RELEASE ORAL at 12:22

## 2019-01-01 RX ADMIN — Medication 3 MILLILITER(S): at 04:36

## 2019-01-01 RX ADMIN — LISINOPRIL 40 MILLIGRAM(S): 2.5 TABLET ORAL at 10:14

## 2019-01-01 RX ADMIN — DULOXETINE HYDROCHLORIDE 30 MILLIGRAM(S): 30 CAPSULE, DELAYED RELEASE ORAL at 12:22

## 2019-01-01 RX ADMIN — HEPARIN SODIUM 7500 UNIT(S): 5000 INJECTION INTRAVENOUS; SUBCUTANEOUS at 06:57

## 2019-01-01 RX ADMIN — Medication 300 MILLIGRAM(S): at 04:33

## 2019-01-01 NOTE — PROGRESS NOTE ADULT - PROBLEM SELECTOR PLAN 1
Allergy to clindamycin; s/p bendryl 50iv, epinephrine 0.e, pepcid 20 and solumedrol 125 in the ED; patient appears comfortable, no increased work of breathing, protecting airway  -continue hemodynamic monitoring    #Stable cutaneous eruption. Per skin exam above, patient has numerous excoriated grouped pink papules with superficial scale scattered on arms, legs, torso with diffuse erythema, more prominently on extensor surfaces. Associated with severe itch. States rash started when patient first scraped his knee.   -- Rash is currently stable. Continue to monitor

## 2019-01-01 NOTE — PROGRESS NOTE ADULT - PROBLEM SELECTOR PLAN 10
1) PCP Contacted on Admission: (Y/N) --> No.  Name & Phone #: Dr. Julian Gibson/Dr. Min  2) Date of Contact with PCP:  3) PCP Contacted at Discharge: (Y/N)  4) Summary of Handoff Given to PCP:   5) Post-Discharge Appointment Date and Location:

## 2019-01-01 NOTE — DISCHARGE NOTE ADULT - PLAN OF CARE
Resolution of allergic symptoms You came to the hospital because you developed an allergy to an antibiotic, clindamycin, given to you to treat your knee abrasion. To treat your severe allergy, we gave you benadryl, steroids, epinephrine, and pepcid. With this your allergy subsided. We gave you some vancomycin antibiotic for your knee abrasion. We also did an ultrasound of your left leg which showed us that there are no blood clots in that leg. We are discharging you on 1 week of bactrim, an antibiotic, for your knee injury. We are also giving you a few days supply of pepcid and benadryl to use in case your allergic symptoms recur. Please follow up with your PCP in 1-2 weeks of discharge.

## 2019-01-01 NOTE — DISCHARGE NOTE ADULT - PATIENT PORTAL LINK FT
You can access the [facility] Patient Portal, offered by [facility] [place], by registering with the following website: [email or web address]

## 2019-01-01 NOTE — DISCHARGE NOTE ADULT - MEDICATION SUMMARY - MEDICATIONS TO TAKE
I will START or STAY ON the medications listed below when I get home from the hospital:    balsalazide 750 mg oral capsule  -- 1 cap(s) by mouth 3 times a day  -- Indication: For Ulcerative colitis with complication, unspecified location    quinapril 20 mg oral tablet  -- 2 tab(s) by mouth once a day  -- Indication: For Htn    DULoxetine 30 mg oral delayed release capsule  -- 1 cap(s) by mouth once a day  -- Indication: For Depression, unspecified depression type    metFORMIN 500 mg oral tablet  -- 2 tab(s) by mouth 2 times a day  -- Indication: For Diabetes    NovoLOG 100 units/mL subcutaneous solution  -- 12 unit(s) subcutaneous 3 times a day  -- Indication: For Diabetes    Levemir 100 units/mL subcutaneous solution  -- 33 unit(s) subcutaneous once a day  -- Indication: For Diabetes    Allergy (Diphenhydramine HCl) 25 mg oral capsule  -- 1 cap(s) by mouth every 4 hours as needed for allergic symptoms such as   -- May cause drowsiness.  Alcohol may intensify this effect.  Use care when operating dangerous machinery.  Obtain medical advice before taking any non-prescription drugs as some may affect the action of this medication.    -- Indication: For Allergic reaction to drug, initial encounter    Lipitor 40 mg oral tablet  -- 1 tab(s) by mouth once a day  -- Indication: For Hyperlipidemia, unspecified hyperlipidemia type    Ventolin HFA 90 mcg/inh inhalation aerosol  -- 2 puff(s) inhaled 4 times a day  -- Indication: For Asthma, unspecified asthma severity, unspecified whether complicated, unspecified whether persistent    Advair Diskus 250 mcg-50 mcg inhalation powder  -- 1 puff(s) inhaled 2 times a day  -- Indication: For Allergic reaction to drug, initial encounter    Pepcid 20 mg oral tablet  -- 1 tab(s) by mouth 2 times a day   -- It is very important that you take or use this exactly as directed.  Do not skip doses or discontinue unless directed by your doctor.  Obtain medical advice before taking any non-prescription drugs as some may affect the action of this medication.    -- Indication: For Allergic reaction to drug, initial encounter    buPROPion 150 mg/24 hours (XL) oral tablet, extended release  -- 1 tab(s) by mouth every 24 hours  -- Indication: For Depression, unspecified depression type    Bactrim  mg-160 mg oral tablet  -- 1 tab(s) by mouth 2 times a day   -- Avoid prolonged or excessive exposure to direct and/or artificial sunlight while taking this medication.  Finish all this medication unless otherwise directed by prescriber.  Medication should be taken with plenty of water.    -- Indication: For Eschar

## 2019-01-01 NOTE — PROGRESS NOTE ADULT - PROBLEM SELECTOR PLAN 4
-likely 2/2 increased work of breathing in the setting of allergic reaction  -other ddx: severe sepsis vs DKA  -f/u blood culture - NGTD   -s/p 2L NC

## 2019-01-01 NOTE — PROGRESS NOTE ADULT - PROBLEM SELECTOR PROBLEM 8
Asthma, unspecified asthma severity, unspecified whether complicated, unspecified whether persistent
normal...

## 2019-01-01 NOTE — PROGRESS NOTE ADULT - SUBJECTIVE AND OBJECTIVE BOX
INTERVAL HPI/OVERNIGHT EVENTS:  O/N: ADRIANNA    This AM: Patient has pain around eschar sites but states it is improved from time of admission. He is complaining of a rash that started at the time he first scraped his knee on the sidewalk. The rash is diffuse on arms, legs, and torso, and itches. He states the rash is stabilizing now and was worse before. He denies wheezing, dyspnea, hives, or other symptoms. ROS otherwise negative.     ICU Vital Signs Last 24 Hrs  T(C): 36.7 (01 Jan 2019 04:08), Max: 37.3 (31 Dec 2018 15:54)  T(F): 98.1 (01 Jan 2019 04:08), Max: 99.1 (31 Dec 2018 15:54)  HR: 84 (01 Jan 2019 04:08) (75 - 135)  BP: 11/66 (01 Jan 2019 04:08) (11/66 - 171/89)  RR: 18 (01 Jan 2019 04:08) (18 - 24)  SpO2: 98% (01 Jan 2019 04:08) (95% - 98%)    PHYSICAL EXAM:    Constitutional: NAD, morbidly obese white male, eating breakfast   HEENT: MMM  Respiratory: good air entry b/l  Cardiac: +S1/S2, 3/6 systolic murmur radiating to carotids; RRR   Gastrointestinal: abdomen soft, NT/Difffusely distended with ventral hernia; no rebound or guarding; +BSx4  Genitourinary: no quispe  Extremities: 1+ peripheral edema L>R, L knee has black eschar without purulent discharge - mild erythema around left ankle  Neurologic: AAOx3; CNII-XII grossly intact; no focal deficits  Psych: normal affect  Skin: L knee has black eschar without purulent discharge - mild erythema around left ankle; numerous excoriated grouped pink papules with superficial scale scattered on arms, legs, torso with diffuse erythema, more prominently on extensor surfaces     MEDICATIONS  (STANDING):  ALBUTerol/ipratropium for Nebulization 3 milliLiter(s) Nebulizer every 6 hours  atorvastatin 40 milliGRAM(s) Oral at bedtime  balsalazide 750 milliGRAM(s) Oral three times a day  buPROPion XL . 150 milliGRAM(s) Oral daily  DULoxetine 30 milliGRAM(s) Oral daily  heparin  Injectable 7500 Unit(s) SubCutaneous every 12 hours  insulin lispro (HumaLOG) corrective regimen sliding scale   SubCutaneous Before meals and at bedtime  lisinopril 40 milliGRAM(s) Oral every 24 hours  vancomycin  IVPB 1500 milliGRAM(s) IV Intermittent every 12 hours    MEDICATIONS  (PRN):      Allergies    clindamycin (Short breath; Flushing)  penicillin (Short breath)    Intolerances        LABS:                        15.7   6.4   )-----------( 387      ( 31 Dec 2018 19:24 )             47.7     01-01    141  |  105  |  18  ----------------------------<  173<H>  4.7   |  24  |  1.07    Ca    8.5      01 Jan 2019 05:21  Mg     1.9     01-01    TPro  7.3  /  Alb  4.0  /  TBili  0.4  /  DBili  x   /  AST  28  /  ALT  19  /  AlkPhos  68  12-31          RADIOLOGY & ADDITIONAL TESTS:  Reviewed

## 2019-01-01 NOTE — PROGRESS NOTE ADULT - PROBLEM SELECTOR PLAN 6
-last known AIC in August 6.7, follows with Dr. Llamas ([facility])  -takes metformin 1000bid, Levemir 33u and Novolog 12u tid  -c/w sliding scale  -monitor FS  -if FS>200, consider dosing NPH in the AM

## 2019-01-01 NOTE — DISCHARGE NOTE ADULT - ADDITIONAL INSTRUCTIONS
Please follow up with Julian Gibson MD at [facility] in 1-2 wks of discharge.

## 2019-01-01 NOTE — PROGRESS NOTE ADULT - PROBLEM SELECTOR PLAN 5
-hx of HTN  -c/w quinapril 40qd (home med)    #HLD  -c/w Lipitor 40qd (home med)    #Depression   -hx of depression  -takes buproprion 150mg XL qd, duloxetine 30mg qd  -c/w home meds

## 2019-01-01 NOTE — PROGRESS NOTE ADULT - PROBLEM SELECTOR PLAN 7
-hx of ulcerative colitis and s/p removal of partial sigmoidectomy with abscess  -c/w balsalazide 750 tid    #Ventral Hernia  -need outpatient follow-up

## 2019-01-01 NOTE — PROGRESS NOTE ADULT - PROBLEM SELECTOR PLAN 2
Initially presented with R knee eschar for which he was given clindamycin. developed allergy to clindamycin. Continues to have eschar. No purulence noted  -Continue vancomycin 1.5g q12   -F/U ID recs   -f/u vancomycin trough    #LLE edema  -unilateral peripheral edema likely 2/2 PVD and recent fall  -other ddx: CHF - patient has a systolic murmur, will need echocardiogram in the outpatient setting

## 2019-01-01 NOTE — DISCHARGE NOTE ADULT - HOSPITAL COURSE
64 y/o male with h/o HTN, HLD, IDDM, Asthma on advair/ventolin, large chronic L sided abd wall hernia, discharged from ED earlier for poorly healing R anterior knee wound presented with allergic reaction for few hours. Pt had a fall on a snowy day about 1 week before Thanksgiving, fell on his left and developed abrasion. Pt tried using bacitracin without any improvement in healing. On day of presentation he went to see his PCP (Dr. Min/Julian Gibson) who noticed eschar over his R knee and advised him to go to ED for possible debridement. When patient visited [facility] ED, patient was given IV clindamycin 600 and discharged on PO clindamycin. About an hour after having IV clindamycin, patient developed diffuse upper extremity erythema and SOB. Patient subsequently returned back to the ED. In the ED, vitals T98.1, P135, R24, /64, saturating 95% 3L NC. Pt received benadryl 50IV, epinephrine 0.3, Pepcid 20, solumedrol 125, NS 2L. ROS: No fever, chills, SOB, palpitations, n/v or diarrhea. Exercise tolerance is two flights at baseline. Has LLE edema since the fall.  Patient was started on vancomycin for knee eschar. His allergic symptoms subsided. LLE U/S showed no thrombosis. Patient is now hemodynamically stable and is being discharged on 1 week of bactrim as well as a few days of pepcid and benadryl to use in case his allergic symptoms recur.

## 2019-01-01 NOTE — PROGRESS NOTE ADULT - PROBLEM SELECTOR PLAN 3
Although patient meets severe sepsis criteria given P125, R24 and lactic acidosis with source of cellulitis, his tachycardia, tachypnea and lactic acidosis from tachypnea are from allergic reaction to clindamycin   -s/p 2L  -reperfusion exam completed

## 2019-01-01 NOTE — DISCHARGE NOTE ADULT - CARE PLAN
Principal Discharge DX:	Allergic reaction to drug, initial encounter  Goal:	Resolution of allergic symptoms  Assessment and plan of treatment:	You came to the hospital because you developed an allergy to an antibiotic, clindamycin, given to you to treat your knee abrasion. To treat your severe allergy, we gave you benadryl, steroids, epinephrine, and pepcid. With this your allergy subsided. We gave you some vancomycin antibiotic for your knee abrasion. We also did an ultrasound of your left leg which showed us that there are no blood clots in that leg. We are discharging you on 1 week of bactrim, an antibiotic, for your knee injury. We are also giving you a few days supply of pepcid and benadryl to use in case your allergic symptoms recur. Please follow up with your PCP in 1-2 weeks of discharge.

## 2019-01-01 NOTE — DISCHARGE NOTE ADULT - CONDITIONS AT DISCHARGE
Pt has been well during shift. No complaint of pain or allergy symptoms. Pt L leg wound has no signs of worsening infection. PT 20g R hand IV has been removed.

## 2019-01-01 NOTE — PROGRESS NOTE ADULT - ATTENDING COMMENTS
Pt. seen and examined by me at 10:45AM; I have read Dr. Saeed's note, I agree w/ her findings and plan of care as documented; cont. abx, can change to PO Bactrim to complete at least 7-day course, elevate leg, check Doppler U/S; Clinda added to allergy list on EMR, will give rx for Pepcid and Benadryl PRN, prednisone x2 more days; Pt. advised to f/u at [facility] within 1 week

## 2019-01-03 DIAGNOSIS — E78.5 HYPERLIPIDEMIA, UNSPECIFIED: ICD-10-CM

## 2019-01-03 DIAGNOSIS — R21 RASH AND OTHER NONSPECIFIC SKIN ERUPTION: ICD-10-CM

## 2019-01-03 DIAGNOSIS — K51.90 ULCERATIVE COLITIS, UNSPECIFIED, WITHOUT COMPLICATIONS: ICD-10-CM

## 2019-01-03 DIAGNOSIS — F32.9 MAJOR DEPRESSIVE DISORDER, SINGLE EPISODE, UNSPECIFIED: ICD-10-CM

## 2019-01-03 DIAGNOSIS — Z87.828 PERSONAL HISTORY OF OTHER (HEALED) PHYSICAL INJURY AND TRAUMA: ICD-10-CM

## 2019-01-03 DIAGNOSIS — L03.116 CELLULITIS OF LEFT LOWER LIMB: ICD-10-CM

## 2019-01-03 DIAGNOSIS — E11.9 TYPE 2 DIABETES MELLITUS WITHOUT COMPLICATIONS: ICD-10-CM

## 2019-01-03 DIAGNOSIS — K43.9 VENTRAL HERNIA WITHOUT OBSTRUCTION OR GANGRENE: ICD-10-CM

## 2019-01-03 DIAGNOSIS — E66.01 MORBID (SEVERE) OBESITY DUE TO EXCESS CALORIES: ICD-10-CM

## 2019-01-03 DIAGNOSIS — I10 ESSENTIAL (PRIMARY) HYPERTENSION: ICD-10-CM

## 2019-01-03 DIAGNOSIS — T36.8X5A ADVERSE EFFECT OF OTHER SYSTEMIC ANTIBIOTICS, INITIAL ENCOUNTER: ICD-10-CM

## 2019-01-03 DIAGNOSIS — J45.909 UNSPECIFIED ASTHMA, UNCOMPLICATED: ICD-10-CM

## 2019-01-03 DIAGNOSIS — L53.9 ERYTHEMATOUS CONDITION, UNSPECIFIED: ICD-10-CM

## 2019-01-08 ENCOUNTER — APPOINTMENT (OUTPATIENT)
Dept: INTERNAL MEDICINE | Facility: CLINIC | Age: 64
End: 2019-01-08
Payer: COMMERCIAL

## 2019-01-08 VITALS — TEMPERATURE: 99 F | DIASTOLIC BLOOD PRESSURE: 85 MMHG | SYSTOLIC BLOOD PRESSURE: 138 MMHG | HEART RATE: 90 BPM

## 2019-01-08 DIAGNOSIS — L29.9 PRURITUS, UNSPECIFIED: ICD-10-CM

## 2019-01-08 PROCEDURE — 99495 TRANSJ CARE MGMT MOD F2F 14D: CPT

## 2019-01-09 ENCOUNTER — APPOINTMENT (OUTPATIENT)
Dept: OTOLARYNGOLOGY | Facility: CLINIC | Age: 64
End: 2019-01-09
Payer: COMMERCIAL

## 2019-01-09 VITALS
HEART RATE: 96 BPM | WEIGHT: 315 LBS | BODY MASS INDEX: 47.74 KG/M2 | DIASTOLIC BLOOD PRESSURE: 70 MMHG | SYSTOLIC BLOOD PRESSURE: 121 MMHG | HEIGHT: 68 IN

## 2019-01-09 PROCEDURE — 99213 OFFICE O/P EST LOW 20 MIN: CPT | Mod: 25

## 2019-01-09 PROCEDURE — 31231 NASAL ENDOSCOPY DX: CPT

## 2019-01-09 NOTE — END OF VISIT
[] : Resident [[email or web address]] : follow up after recent hospitalization- patient was sent to [facility] for infected knee abrasion with large defect in skin , after scab fell off with purulent material- review of hospital records - x ray no evidence of bone involvement discharged on bactrim( allergic reaction to clindamycin)- patient last dose of bactrim is today. significant improvement in ertythema and size of opening improved by at least 2/3. pink granulation tissue , non tender to touch , no warmth.\par patient non toxic - no other complaints\par continue to keep area clean \par monitor for any changes once antibiotic completed\par follow up in two weeks.\par I asked NP mei clemente to assess wound in event patient returns and i am unavailable to assess changes in wound size.\par patient encouraged to continue to meet with [place] and maintain progress in weight loss.\par return in two weeks.

## 2019-01-09 NOTE — HISTORY OF PRESENT ILLNESS
[de-identified] : 63M with IDDM, HLD, HTN, asthma, morbid obesity (BMP > 50), UC s/p partial colectomy, multiple abdominal hernias presents for follow up of left knee ulcer. Patient was at [facility] on Dec 31 and sent to ED then for left knee ulcer for IV Abx. Pt received IV clindamycin and discharged from ED with PO clindamycin. Pt then returned to ED a few hours later with allergic reaction (diffuse body rash and SOB). Pt  was treated with epinephrine, solumedrol, pepcid, and benadryl. He was discharged the next day on bactrim for cellulitis and benadryl as needed for itching. Since discharge, pt has been compliant with Bactrim, has one more dose tonight. His left knee ulcer is still not healing, however, not purulent with no discharge, and not painful, and denies fevers or chills. \par \par \par

## 2019-01-09 NOTE — PHYSICAL EXAM
[No Acute Distress] : no acute distress [Well Nourished] : well nourished [Well Developed] : well developed [Normal Sclera/Conjunctiva] : normal sclera/conjunctiva [PERRL] : pupils equal round and reactive to light [EOMI] : extraocular movements intact [Normal Outer Ear/Nose] : the outer ears and nose were normal in appearance [Normal Oropharynx] : the oropharynx was normal [No JVD] : no jugular venous distention [Supple] : supple [No Respiratory Distress] : no respiratory distress  [Clear to Auscultation] : lungs were clear to auscultation bilaterally [No Accessory Muscle Use] : no accessory muscle use [Normal Rate] : normal rate  [Regular Rhythm] : with a regular rhythm [Normal S1, S2] : normal S1 and S2 [No Murmur] : no murmur heard [Pedal Pulses Present] : the pedal pulses are present [No Extremity Clubbing/Cyanosis] : no extremity clubbing/cyanosis [No Joint Swelling] : no joint swelling [Normal Gait] : normal gait [Normal Affect] : the affect was normal [Alert and Oriented x3] : oriented to person, place, and time [Normal Insight/Judgement] : insight and judgment were intact [de-identified] : Obese male, protruding large ventral hernia [de-identified] :  Large LLQ incisional hernia, non-reducible, non-tender, hyperactive BS, whole abdomen non tender . \par  Large LLQ inscisional hernia, non-reducible, non-tender, hyperactive BS, whole abdomen non tender . \par  Large LLQ inscisional hernia, non-reducible, non-tender, hyperactive BS, whole abdomen non tender . \par  Large LLQ inscisional hernia, non-reducible, non-tender, hyperactive BS, whole abdomen non tender . \par Large LLQ ventral hernia, +BS, nontender, not reducible due to size [de-identified] : Ulcer on left knee about 1cm in diameter, .5 cm deep with surrounding erythema, though erythema likely dermatitis from rectangular dressing. No active drainage, no purulence, no induration, no fluctuance, non tender [de-identified] : maculopapular rash with excoriations (pt was scratching his arms during visit). Rash is sparing folds of skin, back, face, palms, and soles

## 2019-01-09 NOTE — REVIEW OF SYSTEMS
[Recent Change In Weight] : ~T recent weight change [Negative] : Eyes [Itching] : itching [Skin Rash] : skin rash [Fever] : no fever [Chills] : no chills [Fatigue] : no fatigue [Chest Pain] : no chest pain [Palpitations] : no palpitations [Claudication] : no  leg claudication [Orthopena] : no orthopnea [Shortness Of Breath] : no shortness of breath [Wheezing] : no wheezing [Cough] : no cough [Dyspnea on Exertion] : not dyspnea on exertion [Nausea] : no nausea [Constipation] : no constipation [Diarrhea] : no diarrhea [Vomiting] : no vomiting [Heartburn] : no heartburn [Melena] : no melena [Dysuria] : no dysuria [Incontinence] : no incontinence [Hematuria] : no hematuria [Joint Stiffness] : no joint stiffness [Back Pain] : no back pain [Joint Swelling] : no joint swelling [Mole Changes] : no mole changes [Headache] : no headache [Dizziness] : no dizziness [Fainting] : no fainting [Unsteady Walk] : no ataxia [Memory Loss] : no memory loss [Suicidal] : not suicidal [Insomnia] : no insomnia [Anxiety] : no anxiety [Easy Bleeding] : no easy bleeding [Easy Bruising] : no easy bruising [FreeTextEntry2] : intentional weight loss [FreeTextEntry4] : Nasal  [FreeTextEntry7] : Occasional abdominal pain from hernia

## 2019-01-09 NOTE — ASSESSMENT
[FreeTextEntry1] : 63M with IDDM, HLD, HTN, asthma, morbid obesity (BMP > 50), UC s/p partial colectomy, multiple abdominal hernias presents for follow up of left knee ulcer.\par \par # Left Knee Ulcer: poor wound healing but does not appear infected at this time, no signs or symptoms of systemic infection\par - finish course of Bactrim today\par \par \par # Maculopapular Rash: started since November, pruritic, sparing back, skin folds, palms and soles. Medications reviewed with pt, no new medications started in November. Rash worsened from allergic reaction to Clindamycin. Given history of multiple meds allergies, pruritic nature, and reported improvement with steroids, likely atopic\par - Trial of Loratadine daily\par - Benadryl cream\par - Dermatology referral\par \par # Obesity\par - Continuing Nutrition plan for weight loss\par - Pt not candidate for bariatric surgery per Dr. Butler due to large ventral hernia\par \par # Nasal Polyps\par - Following with ENT on 1/9/19, referral placed\par \par # HTN \par - c/w quinapril 40mg daily\par \par # DM2: on basal/bolus insulin, well controlled. HbA1C was 5.8 on 12/31 on [facility]\par - c/w Metformin 100mg BID\par - c/w Levemir 33 qHS/ Novolog 12 units TIDAC\par - continue follow with Endocrine, Dr. Llamas\par - Ophtho and Podiatry referral in place\par \par # Ventral Hernia\par - Follows with Gen Surg, Dr. Douglas\par - Needs weight loss prior to surgery\par \par # UC\par - Well controlled, on Balsalazide\par - continue follow with GI (Dr Kevin/ Dr. Thomson)\par \par #HCM:\par - C-scope Oct 2017\par - Pneumovax 2016\par - Tdap 2015\par - Received Zostavax, will discuss Shingrix next visit\par - Did not get Flu vaccine this year, need to discuss next visit\par \par RTC in 2 weeks to recheck left knee ulcer\par

## 2019-01-14 ENCOUNTER — RX RENEWAL (OUTPATIENT)
Age: 64
End: 2019-01-14

## 2019-01-14 VITALS — BODY MASS INDEX: 50.63 KG/M2 | WEIGHT: 315 LBS

## 2019-01-22 ENCOUNTER — MED ADMIN CHARGE (OUTPATIENT)
Age: 64
End: 2019-01-22

## 2019-01-22 ENCOUNTER — APPOINTMENT (OUTPATIENT)
Dept: INTERNAL MEDICINE | Facility: CLINIC | Age: 64
End: 2019-01-22
Payer: COMMERCIAL

## 2019-01-22 VITALS
WEIGHT: 315 LBS | SYSTOLIC BLOOD PRESSURE: 140 MMHG | BODY MASS INDEX: 47.74 KG/M2 | RESPIRATION RATE: 15 BRPM | HEIGHT: 68 IN | TEMPERATURE: 98.6 F | HEART RATE: 78 BPM | DIASTOLIC BLOOD PRESSURE: 87 MMHG | OXYGEN SATURATION: 96 %

## 2019-01-22 DIAGNOSIS — Z80.42 FAMILY HISTORY OF MALIGNANT NEOPLASM OF PROSTATE: ICD-10-CM

## 2019-01-22 DIAGNOSIS — Z83.42 FAMILY HISTORY OF FAMILIAL HYPERCHOLESTEROLEMIA: ICD-10-CM

## 2019-01-22 DIAGNOSIS — Z82.49 FAMILY HISTORY OF ISCHEMIC HEART DISEASE AND OTHER DISEASES OF THE CIRCULATORY SYSTEM: ICD-10-CM

## 2019-01-22 DIAGNOSIS — Z82.5 FAMILY HISTORY OF ASTHMA AND OTHER CHRONIC LOWER RESPIRATORY DISEASES: ICD-10-CM

## 2019-01-22 PROCEDURE — 90686 IIV4 VACC NO PRSV 0.5 ML IM: CPT

## 2019-01-22 PROCEDURE — 99214 OFFICE O/P EST MOD 30 MIN: CPT | Mod: GC,25

## 2019-01-22 PROCEDURE — G0008: CPT

## 2019-01-22 NOTE — HISTORY OF PRESENT ILLNESS
[FreeTextEntry1] : Follow up of L knee ulcer  [de-identified] : 63M with IDDM, HLD, HTN, asthma, morbid obesity (BMP > 50), UC s/p partial colectomy, multiple abdominal hernias presents for follow up of left knee ulcer. He completed his course of Bactrim, states the abrasion/scab is improving, however continues to report pruritus around the scab. He overall feels well, does report intermittent daytime sleepiness. Has been compliant with CPAP machines, does take intermittent naps throughout the day. Denies any worsening purulence of the left knee wound, fevers, chills, chest pain, shortness of breath, abd pain, n/v/d, or any other complaints at this time. He does report increased food intake/binge eating several times. He feels he does not have control over what and how much he eats, and has struggled with it for quite some time. \par \par

## 2019-01-22 NOTE — PHYSICAL EXAM
[No Acute Distress] : no acute distress [Well Nourished] : well nourished [Well Developed] : well developed [Well-Appearing] : well-appearing [PERRL] : pupils equal round and reactive to light [EOMI] : extraocular movements intact [Normal Oropharynx] : the oropharynx was normal [No JVD] : no jugular venous distention [Supple] : supple [No Respiratory Distress] : no respiratory distress  [Clear to Auscultation] : lungs were clear to auscultation bilaterally [No Accessory Muscle Use] : no accessory muscle use [Normal Rate] : normal rate  [Regular Rhythm] : with a regular rhythm [Normal S1, S2] : normal S1 and S2 [No Murmur] : no murmur heard [Soft] : abdomen soft [Non Tender] : non-tender [No HSM] : no HSM [No Joint Swelling] : no joint swelling [Normal Gait] : normal gait [Normal Affect] : the affect was normal [Alert and Oriented x3] : oriented to person, place, and time [Normal Insight/Judgement] : insight and judgment were intact [de-identified] : morbidly obese abdomen, large ventral hernia  [de-identified] : LLE 1+ pitting edema, dried eschar measuring 15mm, with mild surrounding erythema due to excoriations, no surrounding induration or fluctuance, Non-tender, normal range of motion.  [de-identified] : mild maculopapular rash noted to bilateral upper extremities, with excoriations from pruritus

## 2019-01-22 NOTE — END OF VISIT
[] : Resident [FreeTextEntry3] : Pt pleasant and conversant, expressed some frustration at his number of medical providers/frequency of visits. On exam, L knee w/ dry eschar, no joint effusion or warmth, no pain with ROM. Discussed importance of lifestyle changes, nutrition follow up, consider wellness visit with Joanne Joe for weight loss in the future. DM2 well controlled, would consider GLP-1 agonist given his comorbid obesity, advised him to discuss medical management of obesity further at upcoming endocrine appointment (discussion initiated in August per last endocrine note) as weight loss surgery contraindicated at this time. Pt will establish with Dr. Fitzpatrick as his resident PCP, f/u in 3 months.

## 2019-01-22 NOTE — REVIEW OF SYSTEMS
[Recent Change In Weight] : ~T recent weight change [Lower Ext Edema] : lower extremity edema [Abdominal Pain] : abdominal pain [Anxiety] : anxiety [Depression] : depression [Fever] : no fever [Chills] : no chills [Fatigue] : no fatigue [Chest Pain] : no chest pain [Palpitations] : no palpitations [Nausea] : no nausea [Vomiting] : no vomiting [Dysuria] : no dysuria [Hematuria] : no hematuria [Joint Pain] : no joint pain [Joint Stiffness] : no joint stiffness [Back Pain] : no back pain [Headache] : no headache [Dizziness] : no dizziness [Fainting] : no fainting [Suicidal] : not suicidal [FreeTextEntry2] : increase in weight [FreeTextEntry5] : left lower extremity swelling [FreeTextEntry7] : intermittent abd pain due to hernia [de-identified] : has history of anxiety/depression

## 2019-01-22 NOTE — ASSESSMENT
[FreeTextEntry1] : 63M with PMH IDDM, HLD, HTN, asthma, morbid obesity (BMI >50), UC s/p partial colectomy, large ventral and incisional hernia presenting to the office for follow up of left knee ulcer. \par \par #Left knee ulcer- continued wound healing, dried eschar remaining, delayed wound healing likely 2/2 underlying diabetes \par - recommended to keep the wound clean/dry, but can use Aquaphor/vaseline or other moisturizers to keep surrounding skin hydrated\par - advised to RTO if presence of fevers, discharge/purulence from wound\par \par #IDDM type 2- last A1c 5.8 (12/31) \par - continued to recommend lifestyle modifications including dietary/physical exercise\par - continue Metformin 1000 mg BID, Levemir 33 qhs/novolog 12 units TID before meals\par - seen by Ophtho within the last year, developing early signs of cataracts, no intervention at this time\par - Podiatry visit scheduled for 1/23/19\par - follows up with Dr. Llamas, would likely benefit from medical management of obesity, possible addition of GLP-1 agonist to assist with weight loss. Next appt for 2/12/19 \par \par # Maculopapular rash- improving since being off Clindamycin \par - continue Loratidine \par - Benadryl cream as needed \par \par # Morbid obesity \par - continue Nutrition follow ups\par - Due to large ventral hernia, pt not a candidate for bariatric surgery per Dr. Butler\par \par #Ventral and Incisional Hernia\par - Follows up with Dr. Douglas, needs to have significant weight loss prior to surgery \par \par #Ulcerative colitis s/p partial colectomy\par - well controlled on Balsalazide\par - follows up with GI (Dr. Kevin/Dr. Thomson) \par \par #Anxiety/Depression\par - well controlled on Buproprion and Duloxetin\par - will do PHQ2 at next visit\par #HCM\par - flu vaccine today \par - C-scope 2017\par - all other vaccines UTD\par \par RTO in 3 months with Dr. Maxine Fitzpatrick for follow up.

## 2019-02-12 ENCOUNTER — APPOINTMENT (OUTPATIENT)
Dept: ENDOCRINOLOGY | Facility: CLINIC | Age: 64
End: 2019-02-12
Payer: COMMERCIAL

## 2019-02-12 VITALS
BODY MASS INDEX: 47.74 KG/M2 | SYSTOLIC BLOOD PRESSURE: 134 MMHG | DIASTOLIC BLOOD PRESSURE: 72 MMHG | HEART RATE: 77 BPM | HEIGHT: 68 IN | WEIGHT: 315 LBS

## 2019-02-12 PROCEDURE — 99214 OFFICE O/P EST MOD 30 MIN: CPT | Mod: 25

## 2019-02-12 PROCEDURE — 82962 GLUCOSE BLOOD TEST: CPT

## 2019-02-12 NOTE — PHYSICAL EXAM
[Alert] : alert [Normal Sclera/Conjunctiva] : normal sclera/conjunctiva [Normal Outer Ear/Nose] : the ears and nose were normal in appearance [No Neck Mass] : no neck mass was observed [No Respiratory Distress] : no respiratory distress [Normal PMI] : the apical impulse was normal [No Edema] : there was no peripheral edema [No Stigmata of Cushings Syndrome] : no stigmata of cushings syndrome [Normal Gait] : normal gait [No Rash] : no rash [Cranial Nerves Intact] : cranial nerves 2-12 were intact [Oriented x3] : oriented to person, place, and time [de-identified] : large, distended [de-identified] : left knee healing ulcer

## 2019-02-12 NOTE — ADDENDUM
[FreeTextEntry1] : I, Karina Pino, acted soley as a scribe for Dr. Americo Llamas on this date. 02/12/2019.

## 2019-02-12 NOTE — END OF VISIT
[FreeTextEntry3] : All medical record entries made by the Scribe were at my, Dr. Americo Llamas, direction and personally dictated by me on 02/12/2019. I have reviewed the chart and agree that the record accurately reflects my personal performance of the history, physical exam, assessment and plan. I have also personally directed, reviewed and agreed with the chart. \par \par   [>50% of Time Spent on Counseling for ____] : Greater than 50% of the encounter time was spent on counseling for [unfilled] [Time Spent: ___ minutes] : I have spent [unfilled] minutes of face to face time with the patient

## 2019-02-12 NOTE — ASSESSMENT
[FreeTextEntry1] : #1-Type II diabetes, well controlled recent A1 C: 6 .7% in August 2018\par Continue present management\par Follow-up with podiatrist and a Ophthalmologist\par \par #2- Morbid obesity, BMI 52\par Benefits of weight loss explained\par Recommend to see the exercises physiologist and nutritionist\par Conversation of drug therapy to reduce weight, Liraglutide, patient  declined it \par Labs , Send metabolic/ lipid, ad thyroid profile\par \par Contact patient with results\par Return in 3 to 6 months [Importance of Diet and Exercise] : importance of diet and exercise to improve glycemic control, achieve weight loss and improve cardiovascular health [Self Monitoring of Blood Glucose] : self monitoring of blood glucose

## 2019-02-12 NOTE — HISTORY OF PRESENT ILLNESS
[FreeTextEntry1] : 8/20/18: A1c 6.7, triglyc 269, LDL-c 50, s.creat 1.18, BMI 53.83, /78 \par 5/16/18: A1c 6.4, s.creat 1.14, BMI 54.43, /77 \par 12/18/17: A1c 6.4, /88 \par \par \par 62 y/o M pt, BMI 52, /72, with a Hx of DM and obesity presents today for f/u visit. Pt with a Hx of multiple GI surgeries including ventral hernia repair; pt's current hernia repair surgery postponed upon waiting for weight reduction. Patient is not aware of any diabetes related complications. Pt has been evaluated by bariatric team and was not recommended surgery 2/2 ventral hernia. Pt attends [employer] for weight loss with dieticians Leonel Green but is looking to find other weight loss programs as he is not seeing great results. \par \par Pt states he went from 360 to 330 lbs, but recently gained 15 lbs back. Pt had a fall in November 2018 that had an infection; pt was treated with Clindamycin in an ED where he was found to be allergic to that abx. Denies polyuria, nocturia, chest pain\par Medicines:  metformin bid

## 2019-02-20 LAB
ALBUMIN SERPL ELPH-MCNC: 4.3 G/DL
ALP BLD-CCNC: 81 U/L
ALT SERPL-CCNC: 20 U/L
ANION GAP SERPL CALC-SCNC: 14 MMOL/L
AST SERPL-CCNC: 18 U/L
BILIRUB SERPL-MCNC: 0.4 MG/DL
BUN SERPL-MCNC: 16 MG/DL
CALCIUM SERPL-MCNC: 9.6 MG/DL
CHLORIDE SERPL-SCNC: 104 MMOL/L
CHOLEST SERPL-MCNC: 120 MG/DL
CHOLEST/HDLC SERPL: 2.4 RATIO
CO2 SERPL-SCNC: 26 MMOL/L
CREAT SERPL-MCNC: 1.07 MG/DL
CREAT SPEC-SCNC: 174 MG/DL
GLUCOSE BLDC GLUCOMTR-MCNC: 85
GLUCOSE SERPL-MCNC: 87 MG/DL
HBA1C MFR BLD HPLC: 6.1 %
HDLC SERPL-MCNC: 49 MG/DL
LDLC SERPL CALC-MCNC: 45 MG/DL
MICROALBUMIN 24H UR DL<=1MG/L-MCNC: 2.1 MG/DL
MICROALBUMIN/CREAT 24H UR-RTO: 12 MG/G
POTASSIUM SERPL-SCNC: 4.7 MMOL/L
PROT SERPL-MCNC: 6.8 G/DL
SODIUM SERPL-SCNC: 144 MMOL/L
TRIGL SERPL-MCNC: 131 MG/DL
TSH SERPL-ACNC: 1.22 UIU/ML

## 2019-02-26 ENCOUNTER — MEDICATION RENEWAL (OUTPATIENT)
Age: 64
End: 2019-02-26

## 2019-02-26 ENCOUNTER — TRANSCRIPTION ENCOUNTER (OUTPATIENT)
Age: 64
End: 2019-02-26

## 2019-03-11 NOTE — ASSESSMENT
[FreeTextEntry1] : Mr. Nur was evaluated for the following:\par \par 1.) Nasal polyps - smaller bilateral\par 2.) Chronic pansinusitis - asymptomatic\par \par PLAN:\par - Continue Fluticasone steroid spray.\par \par Return in 1 year

## 2019-03-11 NOTE — PHYSICAL EXAM
[Nasal Endoscopy Performed] : nasal endoscopy was performed, see procedure section for findings [Midline] : trachea located in midline position [Normal] : no neck adenopathy

## 2019-03-11 NOTE — PROCEDURE
[FreeTextEntry6] : \par Indication:  chronic sinusitis\par -Verbal consent was obtained from patient prior to exam. \par - Charles-Synephrine spray applied to nose bilaterally.\par Nasal endoscopy was performed with flexible scope.\par Findings: \par -- Inferior turbinates mildly edematous bilateral.  Inferior meatus clear bilateral.\par -- Septum was  deviated to the left.  \par -- Mucus clear bilateral nose\par -- Middle turbinates edematous bilateral.  Superior turbinates normal bilateral\par -- Middle meatus occupied by small polyps bilateral.  SER clear on right; small polyp on left.\par -- Nasopharynx without mass or exudate\par -- Adenoids were absent  \par -- Eustachian orifices were clear bilateral\par \par The patient tolerated the procedure well.\par \par \par

## 2019-03-11 NOTE — HISTORY OF PRESENT ILLNESS
[de-identified] : Mr. THOMAS  is a 63 year old M being seen today in the office for f/u.\par \par Patient is feeling well today, and does not have any complaints regarding nose/sinuses today. \par He is here for polyp surveillance. \par Has been using Fluticasone daily. \par \par Had left knee wound after fall in early December.\par c/o rash on both arms also.\par \par \par PRIOR HX:\par Intermittent dull pain left nose/ethmoid area x a few years; can occur q few days to once a week; lasts a few hours. No associated triggers or ameliorating factor. \par Diagnosed with bilateral nasal polyps in July 2018.\par Has allergies to cats/dogs and mold.\par \par \par CT SINUS noncontrast (7/20/2018) at [facility]:\par -- Moderate mucosal thickening with polypoid densities involving the frontal sinus ostia and there is opacification of the frontal recesses. \par -- Moderate mucosal thickening involving the ethmoid air cells.  There is flattening of the left fovea ethmoidalis, consistent with normal anatomic variation. \par -- Mild circumferential mucosal thickening involving the sphenoid sinuses with opacification of the right sphenoethmoidal recess and severe narrowing of the left sphenoethmoidal recess. \par -- Large volume of mucosal thickening/polypoid density involving the maxillary sinuses with almost complete opacification of the right maxillary sinus. There is opacification of the ostiomeatal units.\par -- Mild rightward deviation of the nasal septum. Multiple polypoid mucosal densities within the right middle meatus and left middle meatus, one of the polypoid mucosal densities within the right middle meatus contacting the right inferior turbinate. There is partial opacification of the superior nasal airways. \par -- Right mastoid air cells are hypopneumatized. There is opacification of multiple inferior left mastoid air cells. There is narrowing of the external auditory canals. \par -- Degenerative changes involving the temporomandibular joints. Degenerative changes involving the C1-C2 joint.\par (Images were reviewed.)\par \par ROS unchanged since 7/5/2018. (Please see intake form)

## 2019-03-15 ENCOUNTER — RX RENEWAL (OUTPATIENT)
Age: 64
End: 2019-03-15

## 2019-03-17 ENCOUNTER — TRANSCRIPTION ENCOUNTER (OUTPATIENT)
Age: 64
End: 2019-03-17

## 2019-03-18 ENCOUNTER — TRANSCRIPTION ENCOUNTER (OUTPATIENT)
Age: 64
End: 2019-03-18

## 2019-03-20 ENCOUNTER — RX RENEWAL (OUTPATIENT)
Age: 64
End: 2019-03-20

## 2019-04-18 NOTE — ED ADULT NURSE NOTE - CADM POA URETHRAL CATHETER
Patient calling. States Monday into Tuesday and all day Tuesday, had an anxiety attack.  States she is under a lot of stress at home (having family issues at home, issues with her son, had a litter of puppies in the house, roland has a drinking problem and didn't come home for 2 days). States her legs were shaking, she was seeing stars and almost blacked out. States she did lay down for awhile at that time. Yesterday was very depressed, just emotionally drained and exhausted. Today woke with nausea and a headache. Did have a few emesis of just stomach acid. States she has no appetite, has to force herself to eat.     Patient denies any thoughts to harm herself or others.     Also had a period last month that was late, then had spotting over the weekend, then it just stopped.  States she did have intercourse x2 while she was spotting, no condom was used.      Does have an appointment on May 9 with a psychologist, and is taking Paroxetine 30 mg daily, and states she has been taking her medication.     Reason for Disposition  • Patient sounds very upset or troubled to the triager    Protocols used: ANXIETY AND PANIC ATTACK-A-    Advised patient she should be evaluated and patient agrees. Appointment made for today with MIGUEL ANGLE Loo.    No

## 2019-04-25 ENCOUNTER — RX RENEWAL (OUTPATIENT)
Age: 64
End: 2019-04-25

## 2019-04-29 ENCOUNTER — OTHER (OUTPATIENT)
Age: 64
End: 2019-04-29

## 2019-04-30 ENCOUNTER — RX RENEWAL (OUTPATIENT)
Age: 64
End: 2019-04-30

## 2019-05-02 ENCOUNTER — APPOINTMENT (OUTPATIENT)
Dept: INTERNAL MEDICINE | Facility: CLINIC | Age: 64
End: 2019-05-02

## 2019-05-03 LAB
ANION GAP SERPL CALC-SCNC: 14 MMOL/L
BASOPHILS # BLD AUTO: 0.12 K/UL
BASOPHILS NFR BLD AUTO: 1.5 %
BUN SERPL-MCNC: 21 MG/DL
CALCIUM SERPL-MCNC: 9.6 MG/DL
CHLORIDE SERPL-SCNC: 103 MMOL/L
CO2 SERPL-SCNC: 24 MMOL/L
CREAT SERPL-MCNC: 1.16 MG/DL
EOSINOPHIL # BLD AUTO: 0.53 K/UL
EOSINOPHIL NFR BLD AUTO: 6.6 %
GLUCOSE SERPL-MCNC: 112 MG/DL
HCT VFR BLD CALC: 44.1 %
HGB BLD-MCNC: 14.1 G/DL
IMM GRANULOCYTES NFR BLD AUTO: 0.5 %
LYMPHOCYTES # BLD AUTO: 1.76 K/UL
LYMPHOCYTES NFR BLD AUTO: 21.8 %
MAN DIFF?: NORMAL
MCHC RBC-ENTMCNC: 28.7 PG
MCHC RBC-ENTMCNC: 32 GM/DL
MCV RBC AUTO: 89.8 FL
MONOCYTES # BLD AUTO: 0.84 K/UL
MONOCYTES NFR BLD AUTO: 10.4 %
NEUTROPHILS # BLD AUTO: 4.78 K/UL
NEUTROPHILS NFR BLD AUTO: 59.2 %
PLATELET # BLD AUTO: 311 K/UL
POTASSIUM SERPL-SCNC: 4.6 MMOL/L
RBC # BLD: 4.91 M/UL
RBC # FLD: 14.3 %
SODIUM SERPL-SCNC: 141 MMOL/L
WBC # FLD AUTO: 8.07 K/UL

## 2019-05-14 ENCOUNTER — APPOINTMENT (OUTPATIENT)
Dept: GASTROENTEROLOGY | Facility: HOSPITAL | Age: 64
End: 2019-05-14

## 2019-05-14 ENCOUNTER — OUTPATIENT (OUTPATIENT)
Dept: OUTPATIENT SERVICES | Facility: HOSPITAL | Age: 64
LOS: 1 days | Discharge: HOME | End: 2019-05-14
Payer: COMMERCIAL

## 2019-05-14 ENCOUNTER — RESULT REVIEW (OUTPATIENT)
Age: 64
End: 2019-05-14

## 2019-05-14 DIAGNOSIS — Z90.49 ACQUIRED ABSENCE OF OTHER SPECIFIED PARTS OF DIGESTIVE TRACT: Chronic | ICD-10-CM

## 2019-05-14 LAB — GLUCOSE BLDC GLUCOMTR-MCNC: 119 MG/DL — HIGH (ref 70–99)

## 2019-05-14 PROCEDURE — 45380 COLONOSCOPY AND BIOPSY: CPT

## 2019-05-14 PROCEDURE — 88305 TISSUE EXAM BY PATHOLOGIST: CPT

## 2019-05-14 PROCEDURE — 82962 GLUCOSE BLOOD TEST: CPT

## 2019-05-15 LAB — SURGICAL PATHOLOGY STUDY: SIGNIFICANT CHANGE UP

## 2019-05-28 ENCOUNTER — RX RENEWAL (OUTPATIENT)
Age: 64
End: 2019-05-28

## 2019-05-29 ENCOUNTER — APPOINTMENT (OUTPATIENT)
Dept: OTOLARYNGOLOGY | Facility: CLINIC | Age: 64
End: 2019-05-29
Payer: COMMERCIAL

## 2019-05-29 VITALS
BODY MASS INDEX: 47.74 KG/M2 | HEIGHT: 68 IN | WEIGHT: 315 LBS | DIASTOLIC BLOOD PRESSURE: 90 MMHG | SYSTOLIC BLOOD PRESSURE: 133 MMHG | HEART RATE: 73 BPM

## 2019-05-29 DIAGNOSIS — Z87.19 PERSONAL HISTORY OF OTHER DISEASES OF THE DIGESTIVE SYSTEM: ICD-10-CM

## 2019-05-29 PROCEDURE — 69210 REMOVE IMPACTED EAR WAX UNI: CPT

## 2019-05-29 PROCEDURE — 99214 OFFICE O/P EST MOD 30 MIN: CPT | Mod: 25

## 2019-05-29 PROCEDURE — 31231 NASAL ENDOSCOPY DX: CPT

## 2019-06-02 PROBLEM — Z87.19 HISTORY OF VENTRAL HERNIA: Status: RESOLVED | Noted: 2017-12-18 | Resolved: 2019-06-02

## 2019-06-02 RX ORDER — CAMPHOR 0.45 %
1-0.1 GEL (GRAM) TOPICAL 3 TIMES DAILY
Qty: 1 | Refills: 0 | Status: DISCONTINUED | COMMUNITY
Start: 2019-01-08 | End: 2019-05-29

## 2019-06-02 RX ORDER — LORATADINE 10 MG/1
10 TABLET ORAL DAILY
Qty: 30 | Refills: 1 | Status: DISCONTINUED | COMMUNITY
Start: 2019-01-08 | End: 2019-05-29

## 2019-06-02 NOTE — PHYSICAL EXAM
[Nasal Endoscopy Performed] : nasal endoscopy was performed, see procedure section for findings [Midline] : trachea located in midline position [Normal] : no neck adenopathy [de-identified] : Copious wet wax was removed from right EAC with suction.  Left wax not as much, also removed with suction.

## 2019-06-02 NOTE — HISTORY OF PRESENT ILLNESS
[de-identified] : Mr. THOMAS reports that  the left paranasal pain returned twice in past 2 weeks.\par Had had no pain for over 6 months.  He has known nasal polyps since July 2018.\par He has been using Fluticasone daily.   Never treated with oral steroids due to his diabetes.\par Has allergies to cats, dogs and mold.\par \par Ears feels waxy and clogged over past few weeks.\par Denies HL, tinnitus, ear d/c or pain.\par \par CT SINUS noncontrast (7/20/2018) at [facility]:\par -- Moderate mucosal thickening with polypoid densities involving the frontal sinus ostia and there is opacification of the frontal recesses. \par -- Moderate mucosal thickening involving the ethmoid air cells.  There is flattening of the left fovea ethmoidalis, consistent with normal anatomic variation. \par -- Mild circumferential mucosal thickening involving the sphenoid sinuses with opacification of the right sphenoethmoidal recess and severe narrowing of the left sphenoethmoidal recess. \par -- Large volume of mucosal thickening/polypoid density involving the maxillary sinuses with almost complete opacification of the right maxillary sinus. There is opacification of the ostiomeatal units.\par -- Mild rightward deviation of the nasal septum. Multiple polypoid mucosal densities within the right middle meatus and left middle meatus, one of the polypoid mucosal densities within the right middle meatus contacting the right inferior turbinate. There is partial opacification of the superior nasal airways. \par -- Right mastoid air cells are hypopneumatized. There is opacification of multiple inferior left mastoid air cells. There is narrowing of the external auditory canals. \par -- Degenerative changes involving the temporomandibular joints. Degenerative changes involving the C1-C2 joint.\par \par

## 2019-06-02 NOTE — PROCEDURE
[FreeTextEntry6] : \par Indication:  chronic sinusitis\par -Verbal consent was obtained from patient prior to exam. \par - Charles-Synephrine spray applied to nose bilaterally.\par Nasal endoscopy was performed with flexible scope.\par Findings: \par -- Inferior turbinates mildly edematous bilateral.  Inferior meatus clear bilateral.\par -- Septum was  deviated to the left.  \par -- Mucus clear bilateral nose\par -- Middle turbinates edematous bilateral.  Superior turbinates normal bilateral\par -- Middle meatus occupied by polyps. larger on right side. SER clear on right; small polyp on left.\par -- Nasopharynx without mass or exudate\par -- Adenoids were absent  \par -- Eustachian orifices were clear bilateral\par \par The patient tolerated the procedure well.\par \par \par

## 2019-06-02 NOTE — ASSESSMENT
[FreeTextEntry1] : Mr. Nur was evaluated for the following:\par \par 1.) Nasal polyps - occupying middle meati mainly\par 2.) Chronic pansinusitis - symptomatic on left side again\par   We discussed nasal polypectomy in office to improve sinus drainage\par   He is a high operative risk for endoscopic sinus surgery in OR\par 3.) Wax impactions removed bilaterally, and he feels better.  Right wax was wet but no infection present.\par \par PLAN:\par - Continue Fluticasone steroid spray.  NO oral steroids due to his DM.\par - Return to office June 26, 2019, for bilateral endoscopic nasal polypectomy.  We reviewed the risks and benefits of the procedure; risks include bleeding, infection, recurrent polyps and need for additional procedures.\par \par

## 2019-06-03 ENCOUNTER — MEDICATION RENEWAL (OUTPATIENT)
Age: 64
End: 2019-06-03

## 2019-06-18 ENCOUNTER — APPOINTMENT (OUTPATIENT)
Dept: INTERNAL MEDICINE | Facility: CLINIC | Age: 64
End: 2019-06-18
Payer: COMMERCIAL

## 2019-06-18 ENCOUNTER — RX RENEWAL (OUTPATIENT)
Age: 64
End: 2019-06-18

## 2019-06-18 VITALS
TEMPERATURE: 98.9 F | SYSTOLIC BLOOD PRESSURE: 141 MMHG | HEIGHT: 68 IN | HEART RATE: 76 BPM | OXYGEN SATURATION: 96 % | BODY MASS INDEX: 47.74 KG/M2 | WEIGHT: 315 LBS | DIASTOLIC BLOOD PRESSURE: 82 MMHG

## 2019-06-18 PROCEDURE — 99214 OFFICE O/P EST MOD 30 MIN: CPT | Mod: 25,GC

## 2019-06-18 PROCEDURE — G0447 BEHAVIOR COUNSEL OBESITY 15M: CPT

## 2019-06-24 ENCOUNTER — RX RENEWAL (OUTPATIENT)
Age: 64
End: 2019-06-24

## 2019-06-26 ENCOUNTER — RESULT REVIEW (OUTPATIENT)
Age: 64
End: 2019-06-26

## 2019-06-26 ENCOUNTER — APPOINTMENT (OUTPATIENT)
Dept: OTOLARYNGOLOGY | Facility: CLINIC | Age: 64
End: 2019-06-26
Payer: COMMERCIAL

## 2019-06-26 VITALS
DIASTOLIC BLOOD PRESSURE: 83 MMHG | BODY MASS INDEX: 47.74 KG/M2 | SYSTOLIC BLOOD PRESSURE: 140 MMHG | HEART RATE: 100 BPM | WEIGHT: 315 LBS | HEIGHT: 68 IN

## 2019-06-26 PROCEDURE — 31237 NSL/SINS NDSC SURG BX POLYPC: CPT | Mod: 50

## 2019-06-27 ENCOUNTER — OUTPATIENT (OUTPATIENT)
Dept: OUTPATIENT SERVICES | Facility: HOSPITAL | Age: 64
LOS: 1 days | End: 2019-06-27
Payer: COMMERCIAL

## 2019-06-27 DIAGNOSIS — Z90.49 ACQUIRED ABSENCE OF OTHER SPECIFIED PARTS OF DIGESTIVE TRACT: Chronic | ICD-10-CM

## 2019-06-27 DIAGNOSIS — J33.9 NASAL POLYP, UNSPECIFIED: ICD-10-CM

## 2019-06-27 PROCEDURE — 88304 TISSUE EXAM BY PATHOLOGIST: CPT

## 2019-07-01 ENCOUNTER — RX RENEWAL (OUTPATIENT)
Age: 64
End: 2019-07-01

## 2019-07-01 LAB — SURGICAL PATHOLOGY STUDY: SIGNIFICANT CHANGE UP

## 2019-07-08 ENCOUNTER — APPOINTMENT (OUTPATIENT)
Dept: PULMONOLOGY | Facility: CLINIC | Age: 64
End: 2019-07-08
Payer: COMMERCIAL

## 2019-07-08 VITALS
BODY MASS INDEX: 36.37 KG/M2 | HEIGHT: 68 IN | DIASTOLIC BLOOD PRESSURE: 70 MMHG | TEMPERATURE: 97.6 F | WEIGHT: 240 LBS | OXYGEN SATURATION: 97 % | SYSTOLIC BLOOD PRESSURE: 142 MMHG | HEART RATE: 81 BPM

## 2019-07-08 PROCEDURE — 94010 BREATHING CAPACITY TEST: CPT

## 2019-07-08 PROCEDURE — 99213 OFFICE O/P EST LOW 20 MIN: CPT | Mod: 25

## 2019-07-08 NOTE — PHYSICAL EXAM
[General Appearance - In No Acute Distress] : no acute distress [Normal Oropharynx] : normal oropharynx [Auscultation Breath Sounds / Voice Sounds] : lungs were clear to auscultation bilaterally [Murmurs] : no murmurs present [Heart Sounds] : normal S1 and S2

## 2019-07-15 ENCOUNTER — RX RENEWAL (OUTPATIENT)
Age: 64
End: 2019-07-15

## 2019-07-19 ENCOUNTER — APPOINTMENT (OUTPATIENT)
Dept: OTOLARYNGOLOGY | Facility: CLINIC | Age: 64
End: 2019-07-19

## 2019-07-25 ENCOUNTER — MEDICATION RENEWAL (OUTPATIENT)
Age: 64
End: 2019-07-25

## 2019-07-29 ENCOUNTER — RX RENEWAL (OUTPATIENT)
Age: 64
End: 2019-07-29

## 2019-07-31 ENCOUNTER — APPOINTMENT (OUTPATIENT)
Dept: INTERNAL MEDICINE | Facility: CLINIC | Age: 64
End: 2019-07-31
Payer: COMMERCIAL

## 2019-07-31 VITALS
HEART RATE: 80 BPM | SYSTOLIC BLOOD PRESSURE: 143 MMHG | HEIGHT: 68 IN | OXYGEN SATURATION: 97 % | WEIGHT: 315 LBS | BODY MASS INDEX: 47.74 KG/M2 | DIASTOLIC BLOOD PRESSURE: 86 MMHG | TEMPERATURE: 98.2 F

## 2019-07-31 PROCEDURE — 36415 COLL VENOUS BLD VENIPUNCTURE: CPT

## 2019-07-31 PROCEDURE — 99214 OFFICE O/P EST MOD 30 MIN: CPT | Mod: 25,GC

## 2019-07-31 RX ORDER — DULOXETINE HYDROCHLORIDE 40 MG/1
40 CAPSULE, DELAYED RELEASE PELLETS ORAL DAILY
Qty: 30 | Refills: 3 | Status: COMPLETED | COMMUNITY
Start: 2019-07-31

## 2019-08-01 ENCOUNTER — MEDICATION RENEWAL (OUTPATIENT)
Age: 64
End: 2019-08-01

## 2019-08-04 LAB
ALBUMIN SERPL ELPH-MCNC: 4.2 G/DL
ALP BLD-CCNC: 65 U/L
ALT SERPL-CCNC: 14 U/L
ANION GAP SERPL CALC-SCNC: 13 MMOL/L
AST SERPL-CCNC: 19 U/L
BASOPHILS # BLD AUTO: 0.11 K/UL
BASOPHILS NFR BLD AUTO: 1.4 %
BILIRUB SERPL-MCNC: 0.3 MG/DL
BUN SERPL-MCNC: 19 MG/DL
CALCIUM SERPL-MCNC: 9.4 MG/DL
CHLORIDE SERPL-SCNC: 106 MMOL/L
CO2 SERPL-SCNC: 25 MMOL/L
CREAT SERPL-MCNC: 1.14 MG/DL
CRP SERPL-MCNC: 0.42 MG/DL
EOSINOPHIL # BLD AUTO: 0.52 K/UL
EOSINOPHIL NFR BLD AUTO: 6.6 %
GLUCOSE SERPL-MCNC: 88 MG/DL
HCT VFR BLD CALC: 42.6 %
HGB BLD-MCNC: 13.6 G/DL
IMM GRANULOCYTES NFR BLD AUTO: 0.6 %
LYMPHOCYTES # BLD AUTO: 1.54 K/UL
LYMPHOCYTES NFR BLD AUTO: 19.6 %
MAN DIFF?: NORMAL
MCHC RBC-ENTMCNC: 28.8 PG
MCHC RBC-ENTMCNC: 31.9 GM/DL
MCV RBC AUTO: 90.1 FL
MONOCYTES # BLD AUTO: 0.86 K/UL
MONOCYTES NFR BLD AUTO: 10.9 %
NEUTROPHILS # BLD AUTO: 4.78 K/UL
NEUTROPHILS NFR BLD AUTO: 60.9 %
PLATELET # BLD AUTO: 270 K/UL
POTASSIUM SERPL-SCNC: 4.3 MMOL/L
PROT SERPL-MCNC: 6.5 G/DL
RBC # BLD: 4.73 M/UL
RBC # FLD: 15 %
SODIUM SERPL-SCNC: 144 MMOL/L
WBC # FLD AUTO: 7.86 K/UL

## 2019-08-08 ENCOUNTER — RX RENEWAL (OUTPATIENT)
Age: 64
End: 2019-08-08

## 2019-08-12 ENCOUNTER — APPOINTMENT (OUTPATIENT)
Dept: ENDOCRINOLOGY | Facility: CLINIC | Age: 64
End: 2019-08-12
Payer: COMMERCIAL

## 2019-08-12 VITALS
WEIGHT: 315 LBS | HEART RATE: 76 BPM | DIASTOLIC BLOOD PRESSURE: 80 MMHG | HEIGHT: 68 IN | BODY MASS INDEX: 47.74 KG/M2 | SYSTOLIC BLOOD PRESSURE: 145 MMHG

## 2019-08-12 LAB — GLUCOSE BLDC GLUCOMTR-MCNC: 83

## 2019-08-12 PROCEDURE — 99214 OFFICE O/P EST MOD 30 MIN: CPT | Mod: 25

## 2019-08-12 PROCEDURE — 82962 GLUCOSE BLOOD TEST: CPT

## 2019-08-20 ENCOUNTER — APPOINTMENT (OUTPATIENT)
Dept: ENDOCRINOLOGY | Facility: CLINIC | Age: 64
End: 2019-08-20

## 2019-08-20 ENCOUNTER — TRANSCRIPTION ENCOUNTER (OUTPATIENT)
Age: 64
End: 2019-08-20

## 2019-09-06 ENCOUNTER — RX RENEWAL (OUTPATIENT)
Age: 64
End: 2019-09-06

## 2019-09-12 ENCOUNTER — RX RENEWAL (OUTPATIENT)
Age: 64
End: 2019-09-12

## 2019-09-12 NOTE — PHYSICAL EXAM
[Alert] : alert [Normal Outer Ear/Nose] : the ears and nose were normal in appearance [Normal Sclera/Conjunctiva] : normal sclera/conjunctiva [No Neck Mass] : no neck mass was observed [No Respiratory Distress] : no respiratory distress [Normal PMI] : the apical impulse was normal [No Edema] : there was no peripheral edema [Normal Gait] : normal gait [No Stigmata of Cushings Syndrome] : no stigmata of cushings syndrome [No Rash] : no rash [Oriented x3] : oriented to person, place, and time [Cranial Nerves Intact] : cranial nerves 2-12 were intact [de-identified] : large, distended [de-identified] : left knee healing ulcer

## 2019-09-12 NOTE — ASSESSMENT
[Importance of Diet and Exercise] : importance of diet and exercise to improve glycemic control, achieve weight loss and improve cardiovascular health [Self Monitoring of Blood Glucose] : self monitoring of blood glucose [FreeTextEntry1] : 65 y/o M with:\par \par 1)Hx of DM with recent A1c 6.1% in 2/12/19\par Pt will continue basal and bolus insulin\par \par 2) Hx of Obesity, \par Pt was explained how to increase energy expenditure.Pt will see an exercise specialist\par Obesity drop treatment discussed with Pt, \par Pt declined Glp1 injections.\par \par Reassessment of abdominal hernia surgery, will refer him for new hernia surgery evaluation

## 2019-09-12 NOTE — END OF VISIT
[>50% of Time Spent on Counseling for ____] : Greater than 50% of the encounter time was spent on counseling for [unfilled] [Time Spent: ___ minutes] : I have spent [unfilled] minutes of face to face time with the patient [FreeTextEntry3] : All medical record entries made by the Scribe were at my, Dr. Americo Llamas, direction and personally dictated by me on 08/12/2019 I have reviewed the chart and agree that the record accurately reflects my personal performance of the history, physical exam, assessment and plan. I have also personally directed, reviewed and agreed with the chart.

## 2019-09-12 NOTE — ADDENDUM
[FreeTextEntry1] : I, Julian Babb, acted solely as a scribe for Dr. Americo Llamas on this date. 08/12/2019.

## 2019-09-12 NOTE — REVIEW OF SYSTEMS
[Negative] : Psychiatric [As Noted in HPI] : as noted in HPI [Blurry Vision] : no blurred vision [Polyuria] : no polyuria [FreeTextEntry2] : inability to lose weight

## 2019-09-12 NOTE — HISTORY OF PRESENT ILLNESS
[FreeTextEntry1] : 12/18/17: A1c 6.4, /88 \par 5/16/18: A1c 6.4, s.creat 1.14, BMI 54.43, /77 \par 8/20/18: A1c 6.7, triglyc 269, LDL-c 50, s.creat 1.18, BMI 53.83, /78 \par 2/12/19: A1c 6.1%, Microalb/creat 12, s .creat 1.07, LDL-c 45, TSH 1.22\par 5/2/19: Glucose 112, s. creat 1.16\par 7/31/19: Glucose 88, s. creat 1.14\par \par 62 y/o M pt, BMI 52, /72, with a Hx of DM and obesity presents today for f/u visit. Pt with a Hx of multiple GI surgeries including ventral hernia repair; pt's current hernia repair surgery postponed upon waiting for weight reduction. Patient is not aware of any diabetes related complications. Pt has been evaluated by bariatric team and was not recommended surgery 2/2 ventral hernia. Pt attends [employer] for weight loss with dieticians Leonel Green but is looking to find other weight loss programs as he is not seeing great results. \par \par 2/12/2019\par Pt states he went from 360 to 330 lbs, but recently gained 15 lbs back. Pt had a fall in November 2018 that had an infection; pt was treated with Clindamycin in an ED where he was found to be allergic to that abx. Denies polyuria, nocturia, chest pain\par \par 08/12/2019 \par Pt presents today for DM and morbid obesity f/u. Pt continues to express his frustration of not being able to have abdominal hernia surgery( pt was told to loss 100lbs). Pt is overall clinically unchanged.\par Pt denies blurry vision, excessive urination and weight loss\par \par Medicines:  metformin bid, multiple insulin injections

## 2019-09-13 ENCOUNTER — APPOINTMENT (OUTPATIENT)
Dept: INTERNAL MEDICINE | Facility: CLINIC | Age: 64
End: 2019-09-13

## 2019-09-16 ENCOUNTER — APPOINTMENT (OUTPATIENT)
Dept: INTERNAL MEDICINE | Facility: CLINIC | Age: 64
End: 2019-09-16
Payer: COMMERCIAL

## 2019-09-16 VITALS
TEMPERATURE: 98.2 F | HEIGHT: 68 IN | WEIGHT: 315 LBS | SYSTOLIC BLOOD PRESSURE: 130 MMHG | BODY MASS INDEX: 47.74 KG/M2 | OXYGEN SATURATION: 75 % | HEART RATE: 95 BPM | DIASTOLIC BLOOD PRESSURE: 77 MMHG

## 2019-09-16 DIAGNOSIS — K43.2 INCISIONAL HERNIA W/OUT OBSTRUCTION OR GANGRENE: ICD-10-CM

## 2019-09-16 PROCEDURE — 99214 OFFICE O/P EST MOD 30 MIN: CPT | Mod: GC

## 2019-09-16 RX ORDER — FLUTICASONE PROPIONATE 50 UG/1
50 SPRAY, METERED NASAL
Refills: 0 | Status: DISCONTINUED | COMMUNITY
End: 2019-09-16

## 2019-09-20 NOTE — PROCEDURE
[Image(s) Captured] : image(s) captured and filed [Mass] : a mass [Oxymetazoline HCl] : oxymetazoline HCl [Topical Lidocaine] : topical lidocaine [Rigid Endoscope] : examined with a rigid endoscope [___ cm] : bilateral [unfilled]Ucm polyp(s) [de-identified] : nasal polyps [FreeTextEntry6] : Informed consent was obtained for nasal endoscopy and removal of polyps.\par Intent of polyp removal is to help reduce ethmoid sinus pressure.\par Risks, benefits and alternates were discussed with him.\par  [FreeTextEntry4] : Left nasal polyps were removed with cup forceps; were firm.  Bleeding controlled with oxymetazoline patties.  Gelfoam applied to middle meatal area. [de-identified] : 1% lidocaine with 1:100,000 epinephrine injection

## 2019-09-26 ENCOUNTER — RX RENEWAL (OUTPATIENT)
Age: 64
End: 2019-09-26

## 2019-10-07 ENCOUNTER — TRANSCRIPTION ENCOUNTER (OUTPATIENT)
Age: 64
End: 2019-10-07

## 2019-10-07 ENCOUNTER — MEDICATION RENEWAL (OUTPATIENT)
Age: 64
End: 2019-10-07

## 2019-10-11 ENCOUNTER — MEDICATION RENEWAL (OUTPATIENT)
Age: 64
End: 2019-10-11

## 2019-10-14 ENCOUNTER — RX RENEWAL (OUTPATIENT)
Age: 64
End: 2019-10-14

## 2019-10-21 NOTE — ED ADULT TRIAGE NOTE - NS ED TRIAGE HISTORIAN
Anesthesia Post Evaluation    Patient: Nery Rivas    
Patient

## 2019-10-25 ENCOUNTER — OTHER (OUTPATIENT)
Age: 64
End: 2019-10-25

## 2019-11-04 ENCOUNTER — NON-APPOINTMENT (OUTPATIENT)
Age: 64
End: 2019-11-04

## 2019-11-04 ENCOUNTER — APPOINTMENT (OUTPATIENT)
Dept: OPHTHALMOLOGY | Facility: CLINIC | Age: 64
End: 2019-11-04
Payer: COMMERCIAL

## 2019-11-04 PROCEDURE — 92133 CPTRZD OPH DX IMG PST SGM ON: CPT

## 2019-11-04 PROCEDURE — 92014 COMPRE OPH EXAM EST PT 1/>: CPT

## 2019-11-04 PROCEDURE — 92083 EXTENDED VISUAL FIELD XM: CPT

## 2019-11-12 ENCOUNTER — RX RENEWAL (OUTPATIENT)
Age: 64
End: 2019-11-12

## 2019-11-18 ENCOUNTER — MED ADMIN CHARGE (OUTPATIENT)
Age: 64
End: 2019-11-18

## 2019-11-18 ENCOUNTER — APPOINTMENT (OUTPATIENT)
Dept: INTERNAL MEDICINE | Facility: CLINIC | Age: 64
End: 2019-11-18
Payer: COMMERCIAL

## 2019-11-18 VITALS
HEIGHT: 68 IN | OXYGEN SATURATION: 96 % | HEART RATE: 75 BPM | TEMPERATURE: 97.8 F | WEIGHT: 315 LBS | BODY MASS INDEX: 47.74 KG/M2 | SYSTOLIC BLOOD PRESSURE: 140 MMHG | DIASTOLIC BLOOD PRESSURE: 80 MMHG

## 2019-11-18 DIAGNOSIS — L03.116 CELLULITIS OF LEFT LOWER LIMB: ICD-10-CM

## 2019-11-18 DIAGNOSIS — R07.89 OTHER CHEST PAIN: ICD-10-CM

## 2019-11-18 DIAGNOSIS — R21 RASH AND OTHER NONSPECIFIC SKIN ERUPTION: ICD-10-CM

## 2019-11-18 PROCEDURE — 99396 PREV VISIT EST AGE 40-64: CPT | Mod: GC,25

## 2019-11-18 PROCEDURE — G0008: CPT

## 2019-11-18 PROCEDURE — 90686 IIV4 VACC NO PRSV 0.5 ML IM: CPT

## 2019-12-02 ENCOUNTER — TRANSCRIPTION ENCOUNTER (OUTPATIENT)
Age: 64
End: 2019-12-02

## 2019-12-04 ENCOUNTER — OTHER (OUTPATIENT)
Age: 64
End: 2019-12-04

## 2019-12-05 ENCOUNTER — RX RENEWAL (OUTPATIENT)
Age: 64
End: 2019-12-05

## 2019-12-12 ENCOUNTER — RX RENEWAL (OUTPATIENT)
Age: 64
End: 2019-12-12

## 2019-12-27 ENCOUNTER — RX RENEWAL (OUTPATIENT)
Age: 64
End: 2019-12-27

## 2020-01-08 ENCOUNTER — RX RENEWAL (OUTPATIENT)
Age: 65
End: 2020-01-08

## 2020-01-08 ENCOUNTER — OTHER (OUTPATIENT)
Age: 65
End: 2020-01-08

## 2020-01-15 ENCOUNTER — APPOINTMENT (OUTPATIENT)
Dept: OTOLARYNGOLOGY | Facility: CLINIC | Age: 65
End: 2020-01-15
Payer: COMMERCIAL

## 2020-01-15 VITALS — DIASTOLIC BLOOD PRESSURE: 110 MMHG | SYSTOLIC BLOOD PRESSURE: 183 MMHG | HEART RATE: 78 BPM

## 2020-01-15 VITALS
SYSTOLIC BLOOD PRESSURE: 177 MMHG | WEIGHT: 315 LBS | HEIGHT: 68 IN | BODY MASS INDEX: 47.74 KG/M2 | HEART RATE: 88 BPM | DIASTOLIC BLOOD PRESSURE: 106 MMHG

## 2020-01-15 VITALS — SYSTOLIC BLOOD PRESSURE: 180 MMHG | DIASTOLIC BLOOD PRESSURE: 102 MMHG | HEART RATE: 78 BPM

## 2020-01-15 DIAGNOSIS — J33.9 NASAL POLYP, UNSPECIFIED: ICD-10-CM

## 2020-01-15 DIAGNOSIS — H61.23 IMPACTED CERUMEN, BILATERAL: ICD-10-CM

## 2020-01-15 DIAGNOSIS — J32.8 OTHER CHRONIC SINUSITIS: ICD-10-CM

## 2020-01-15 DIAGNOSIS — R51 HEADACHE: ICD-10-CM

## 2020-01-15 DIAGNOSIS — G47.33 OBSTRUCTIVE SLEEP APNEA (ADULT) (PEDIATRIC): ICD-10-CM

## 2020-01-15 PROCEDURE — 99213 OFFICE O/P EST LOW 20 MIN: CPT | Mod: 25

## 2020-01-15 PROCEDURE — 31231 NASAL ENDOSCOPY DX: CPT

## 2020-01-16 ENCOUNTER — RESULT CHARGE (OUTPATIENT)
Age: 65
End: 2020-01-16

## 2020-01-17 ENCOUNTER — APPOINTMENT (OUTPATIENT)
Dept: INTERNAL MEDICINE | Facility: CLINIC | Age: 65
End: 2020-01-17
Payer: COMMERCIAL

## 2020-01-17 VITALS
WEIGHT: 315 LBS | HEART RATE: 84 BPM | OXYGEN SATURATION: 96 % | HEIGHT: 68 IN | SYSTOLIC BLOOD PRESSURE: 143 MMHG | DIASTOLIC BLOOD PRESSURE: 83 MMHG | TEMPERATURE: 98.3 F | BODY MASS INDEX: 47.74 KG/M2

## 2020-01-17 DIAGNOSIS — R42 DIZZINESS AND GIDDINESS: ICD-10-CM

## 2020-01-17 PROCEDURE — 93000 ELECTROCARDIOGRAM COMPLETE: CPT

## 2020-01-17 PROCEDURE — 36415 COLL VENOUS BLD VENIPUNCTURE: CPT

## 2020-01-17 PROCEDURE — 99215 OFFICE O/P EST HI 40 MIN: CPT | Mod: 25,GC

## 2020-01-18 ENCOUNTER — EMERGENCY (EMERGENCY)
Facility: HOSPITAL | Age: 65
LOS: 1 days | Discharge: HOME | End: 2020-01-18
Attending: EMERGENCY MEDICINE | Admitting: EMERGENCY MEDICINE
Payer: COMMERCIAL

## 2020-01-18 VITALS
HEART RATE: 86 BPM | RESPIRATION RATE: 18 BRPM | OXYGEN SATURATION: 98 % | DIASTOLIC BLOOD PRESSURE: 80 MMHG | TEMPERATURE: 98 F | SYSTOLIC BLOOD PRESSURE: 146 MMHG

## 2020-01-18 VITALS
HEART RATE: 88 BPM | HEIGHT: 69 IN | OXYGEN SATURATION: 96 % | RESPIRATION RATE: 16 BRPM | WEIGHT: 315 LBS | TEMPERATURE: 98 F | DIASTOLIC BLOOD PRESSURE: 83 MMHG | SYSTOLIC BLOOD PRESSURE: 150 MMHG

## 2020-01-18 DIAGNOSIS — Z90.49 ACQUIRED ABSENCE OF OTHER SPECIFIED PARTS OF DIGESTIVE TRACT: Chronic | ICD-10-CM

## 2020-01-18 LAB
ALBUMIN SERPL ELPH-MCNC: 3.6 G/DL — SIGNIFICANT CHANGE UP (ref 3.3–5)
ALBUMIN SERPL ELPH-MCNC: 3.8 G/DL — SIGNIFICANT CHANGE UP (ref 3.3–5)
ALP SERPL-CCNC: 66 U/L — SIGNIFICANT CHANGE UP (ref 40–120)
ALP SERPL-CCNC: SIGNIFICANT CHANGE UP U/L (ref 40–120)
ALT FLD-CCNC: 16 U/L — SIGNIFICANT CHANGE UP (ref 10–45)
ALT FLD-CCNC: SIGNIFICANT CHANGE UP U/L (ref 10–45)
ANION GAP SERPL CALC-SCNC: 12 MMOL/L — SIGNIFICANT CHANGE UP (ref 5–17)
ANION GAP SERPL CALC-SCNC: 12 MMOL/L — SIGNIFICANT CHANGE UP (ref 5–17)
APPEARANCE UR: CLEAR — SIGNIFICANT CHANGE UP
AST SERPL-CCNC: 22 U/L — SIGNIFICANT CHANGE UP (ref 10–40)
AST SERPL-CCNC: SIGNIFICANT CHANGE UP U/L (ref 10–40)
BASOPHILS # BLD AUTO: 0.11 K/UL — SIGNIFICANT CHANGE UP (ref 0–0.2)
BASOPHILS NFR BLD AUTO: 1.4 % — SIGNIFICANT CHANGE UP (ref 0–2)
BILIRUB SERPL-MCNC: 0.2 MG/DL — SIGNIFICANT CHANGE UP (ref 0.2–1.2)
BILIRUB SERPL-MCNC: 0.3 MG/DL — SIGNIFICANT CHANGE UP (ref 0.2–1.2)
BILIRUB UR-MCNC: NEGATIVE — SIGNIFICANT CHANGE UP
BUN SERPL-MCNC: 16 MG/DL — SIGNIFICANT CHANGE UP (ref 7–23)
BUN SERPL-MCNC: 16 MG/DL — SIGNIFICANT CHANGE UP (ref 7–23)
CALCIUM SERPL-MCNC: 8.9 MG/DL — SIGNIFICANT CHANGE UP (ref 8.4–10.5)
CALCIUM SERPL-MCNC: 9.6 MG/DL — SIGNIFICANT CHANGE UP (ref 8.4–10.5)
CHLORIDE SERPL-SCNC: 105 MMOL/L — SIGNIFICANT CHANGE UP (ref 96–108)
CHLORIDE SERPL-SCNC: 108 MMOL/L — SIGNIFICANT CHANGE UP (ref 96–108)
CO2 SERPL-SCNC: 21 MMOL/L — LOW (ref 22–31)
CO2 SERPL-SCNC: 22 MMOL/L — SIGNIFICANT CHANGE UP (ref 22–31)
COLOR SPEC: YELLOW — SIGNIFICANT CHANGE UP
CREAT SERPL-MCNC: 1.03 MG/DL — SIGNIFICANT CHANGE UP (ref 0.5–1.3)
CREAT SERPL-MCNC: 1.04 MG/DL — SIGNIFICANT CHANGE UP (ref 0.5–1.3)
DIFF PNL FLD: NEGATIVE — SIGNIFICANT CHANGE UP
EOSINOPHIL # BLD AUTO: 0.5 K/UL — SIGNIFICANT CHANGE UP (ref 0–0.5)
EOSINOPHIL NFR BLD AUTO: 6.5 % — HIGH (ref 0–6)
GLUCOSE SERPL-MCNC: 108 MG/DL — HIGH (ref 70–99)
GLUCOSE SERPL-MCNC: 133 MG/DL — HIGH (ref 70–99)
GLUCOSE UR QL: NEGATIVE — SIGNIFICANT CHANGE UP
HCT VFR BLD CALC: 43.5 % — SIGNIFICANT CHANGE UP (ref 39–50)
HGB BLD-MCNC: 14.1 G/DL — SIGNIFICANT CHANGE UP (ref 13–17)
IMM GRANULOCYTES NFR BLD AUTO: 0.8 % — SIGNIFICANT CHANGE UP (ref 0–1.5)
KETONES UR-MCNC: NEGATIVE — SIGNIFICANT CHANGE UP
LEUKOCYTE ESTERASE UR-ACNC: NEGATIVE — SIGNIFICANT CHANGE UP
LIDOCAIN IGE QN: 25 U/L — SIGNIFICANT CHANGE UP (ref 7–60)
LYMPHOCYTES # BLD AUTO: 1.11 K/UL — SIGNIFICANT CHANGE UP (ref 1–3.3)
LYMPHOCYTES # BLD AUTO: 14.4 % — SIGNIFICANT CHANGE UP (ref 13–44)
MCHC RBC-ENTMCNC: 29.1 PG — SIGNIFICANT CHANGE UP (ref 27–34)
MCHC RBC-ENTMCNC: 32.4 GM/DL — SIGNIFICANT CHANGE UP (ref 32–36)
MCV RBC AUTO: 89.9 FL — SIGNIFICANT CHANGE UP (ref 80–100)
MONOCYTES # BLD AUTO: 0.81 K/UL — SIGNIFICANT CHANGE UP (ref 0–0.9)
MONOCYTES NFR BLD AUTO: 10.5 % — SIGNIFICANT CHANGE UP (ref 2–14)
NEUTROPHILS # BLD AUTO: 5.13 K/UL — SIGNIFICANT CHANGE UP (ref 1.8–7.4)
NEUTROPHILS NFR BLD AUTO: 66.4 % — SIGNIFICANT CHANGE UP (ref 43–77)
NITRITE UR-MCNC: NEGATIVE — SIGNIFICANT CHANGE UP
NRBC # BLD: 0 /100 WBCS — SIGNIFICANT CHANGE UP (ref 0–0)
PH UR: 5.5 — SIGNIFICANT CHANGE UP (ref 5–8)
PLATELET # BLD AUTO: 255 K/UL — SIGNIFICANT CHANGE UP (ref 150–400)
POTASSIUM SERPL-MCNC: 4.5 MMOL/L — SIGNIFICANT CHANGE UP (ref 3.5–5.3)
POTASSIUM SERPL-MCNC: SIGNIFICANT CHANGE UP MMOL/L (ref 3.5–5.3)
POTASSIUM SERPL-SCNC: 4.5 MMOL/L — SIGNIFICANT CHANGE UP (ref 3.5–5.3)
POTASSIUM SERPL-SCNC: SIGNIFICANT CHANGE UP MMOL/L (ref 3.5–5.3)
PROT SERPL-MCNC: 6 G/DL — SIGNIFICANT CHANGE UP (ref 6–8.3)
PROT SERPL-MCNC: 7.2 G/DL — SIGNIFICANT CHANGE UP (ref 6–8.3)
PROT UR-MCNC: NEGATIVE MG/DL — SIGNIFICANT CHANGE UP
RBC # BLD: 4.84 M/UL — SIGNIFICANT CHANGE UP (ref 4.2–5.8)
RBC # FLD: 13.4 % — SIGNIFICANT CHANGE UP (ref 10.3–14.5)
SODIUM SERPL-SCNC: 139 MMOL/L — SIGNIFICANT CHANGE UP (ref 135–145)
SODIUM SERPL-SCNC: 141 MMOL/L — SIGNIFICANT CHANGE UP (ref 135–145)
SP GR SPEC: 1.02 — SIGNIFICANT CHANGE UP (ref 1–1.03)
UROBILINOGEN FLD QL: 0.2 E.U./DL — SIGNIFICANT CHANGE UP
WBC # BLD: 7.72 K/UL — SIGNIFICANT CHANGE UP (ref 3.8–10.5)
WBC # FLD AUTO: 7.72 K/UL — SIGNIFICANT CHANGE UP (ref 3.8–10.5)

## 2020-01-18 PROCEDURE — 76705 ECHO EXAM OF ABDOMEN: CPT | Mod: 26

## 2020-01-18 PROCEDURE — 80053 COMPREHEN METABOLIC PANEL: CPT

## 2020-01-18 PROCEDURE — 36415 COLL VENOUS BLD VENIPUNCTURE: CPT

## 2020-01-18 PROCEDURE — 81003 URINALYSIS AUTO W/O SCOPE: CPT

## 2020-01-18 PROCEDURE — 83690 ASSAY OF LIPASE: CPT

## 2020-01-18 PROCEDURE — 99284 EMERGENCY DEPT VISIT MOD MDM: CPT | Mod: 25

## 2020-01-18 PROCEDURE — 85025 COMPLETE CBC W/AUTO DIFF WBC: CPT

## 2020-01-18 PROCEDURE — 99284 EMERGENCY DEPT VISIT MOD MDM: CPT

## 2020-01-18 PROCEDURE — 76705 ECHO EXAM OF ABDOMEN: CPT

## 2020-01-18 RX ORDER — SODIUM CHLORIDE 9 MG/ML
1000 INJECTION INTRAMUSCULAR; INTRAVENOUS; SUBCUTANEOUS ONCE
Refills: 0 | Status: COMPLETED | OUTPATIENT
Start: 2020-01-18 | End: 2020-01-18

## 2020-01-18 RX ADMIN — SODIUM CHLORIDE 1000 MILLILITER(S): 9 INJECTION INTRAMUSCULAR; INTRAVENOUS; SUBCUTANEOUS at 14:15

## 2020-01-18 NOTE — ED PROVIDER NOTE - PATIENT PORTAL LINK FT
You can access the FollowMyHealth Patient Portal offered by [facility] by registering at the following website: [email or web address]. By joining [facility]’s FollowMyHealth portal, you will also be able to view your health information using other applications (apps) compatible with our system.

## 2020-01-18 NOTE — ED PROVIDER NOTE - NSFOLLOWUPINSTRUCTIONS_ED_ALL_ED_FT
Abdominal Pain    Use tylenol and/or ibuprofen for pain.  Follow up with your pmd.  Return for increased pain, vomiting or diarrhea, fever, any other concerns.     Abdominal Pain    Many things can cause abdominal pain. Many times, abdominal pain is not caused by a disease and will improve without treatment. Your health care provider will do a physical exam to determine if there is a dangerous cause of your pain; blood tests and imaging may help determine the cause of your pain. However, in many cases, no cause may be found and you may need further testing as an outpatient. Monitor your abdominal pain for any changes.     SEEK IMMEDIATE MEDICAL CARE IF YOU HAVE ANY OF THE FOLLOWING SYMPTOMS: worsening abdominal pain, uncontrollable vomiting, profuse diarrhea, inability to have bowel movements or pass gas, black or bloody stools, fever accompanying chest pain or back pain, or fainting. These symptoms may represent a serious problem that is an emergency. Do not wait to see if the symptoms will go away. Get medical help right away. Call 911 and do not drive yourself to the hospital.

## 2020-01-18 NOTE — ED ADULT NURSE NOTE - CHPI ED NUR SYMPTOMS NEG
no nausea/no fever/no chills/no dysuria/no hematuria/no burning urination/no blood in stool/no vomiting/no diarrhea

## 2020-01-18 NOTE — ED ADULT TRIAGE NOTE - OTHER COMPLAINTS
patient c/o RUQ pain sent by PCP for r/o pancreatitis--denies hx of pancreatitis---+nausea, denies vomiting, fevers

## 2020-01-18 NOTE — ED PROVIDER NOTE - ATTENDING CONTRIBUTION TO CARE
63 y/o M with PMH of DM, HTN, HLD, asthma, ulcerative colitis c/o RUQ abd pain x 2 wks. pt states intermittent sharp pain to RUQ and right flank. pt notes saw pmd yesterday and received call today stating he had pancreatitis and needs to go to ED. Pt reports mild pain to right flank at this time. no fever or chills. no n/v/d. no constipation. no urinary sx's. no hematuria. no cp or sob. no further complaints. Pt AAO, NAD, abd: soft and NT. No CVAT. Pt well appearing. Labs today with normal LFTs and normal Lipase. case d/w pmd Dr ECHAVARRIA and will get RUQ sono. dispo pending sono results.

## 2020-01-18 NOTE — ED PROVIDER NOTE - CARE PROVIDERS DIRECT ADDRESSES
[email or web address]

## 2020-01-18 NOTE — ED PROVIDER NOTE - CARE PROVIDER_API CALL
Nisa Payton)  Internal Medicine  [address] [place] [address]  Phone: [phone]  Fax: [phone]  Follow Up Time:     [address] ()  [address]  [address] [address]  Phone: [phone]  Fax: [phone]  Follow Up Time:

## 2020-01-18 NOTE — ED ADULT NURSE NOTE - OBJECTIVE STATEMENT
Pt is a 64y male presented to ED w/ c/o RUQ abdominal pain. As per pt had blood done at PCP office and was told to come to ER to rule out pancreatitis. Pt denies any pain/discomfort upon assessment. Pt denies f/chills, n/v/d.

## 2020-01-18 NOTE — ED PROVIDER NOTE - CLINICAL SUMMARY MEDICAL DECISION MAKING FREE TEXT BOX
RUQ/right flank pain. pt well appearing. non tender on exam. ? pancreatitis on outside labs., labs today with normal LFTs and normal Lipase. case d/w pmd Dr ECHAVARRIA and will get RUQ sono. dispo pending sono results.

## 2020-01-18 NOTE — ED PROVIDER NOTE - GASTROINTESTINAL, MLM
+ obese abdomen. + large hernia to LLQ. abd non tender. no distention. no guarding. no rebound. no cva tenderness.

## 2020-01-18 NOTE — ED PROVIDER NOTE - OBJECTIVE STATEMENT
65 y/o male with hx of DM, HTN, HLD, asthma, ulcerative colitis c/o RUQ abd pain x 2 wks. pt states intermittent sharp pain to RUQ and right flank. pt notes saw pmd yesterday and received call today stating he had pancreatitis and needs to go to ED. Pt reports mild pain to right flank at this time. no fever or chills. no n/v/d. no constipation. no urinary sx's. no hematuria. no cp or sob. no further complaints.

## 2020-01-18 NOTE — ED PROVIDER NOTE - PROGRESS NOTE DETAILS
Director - pt received from Dr Arreguin pending ruq u/s for outpt elevated lipase w nl lipase in ed.  Pt pain free w nontender abd.  Labs reviewed.  Await u/s. Director - u/s unchanged from prior.  Will dc. Director - u/s unchanged from prior.  Will dc.  Discussed eval and results w Dr Way and md covering for Dr Payton.  Both state they will fu w pt in clinic.

## 2020-01-21 LAB
ALBUMIN SERPL ELPH-MCNC: 3.8 G/DL
ALP BLD-CCNC: 70 U/L
ALT SERPL-CCNC: 13 U/L
ANION GAP SERPL CALC-SCNC: 11 MMOL/L
AST SERPL-CCNC: 17 U/L
BASOPHILS # BLD AUTO: 0.09 K/UL
BASOPHILS NFR BLD AUTO: 1.4 %
BILIRUB SERPL-MCNC: 0.3 MG/DL
BUN SERPL-MCNC: 20 MG/DL
CALCIUM SERPL-MCNC: 8.5 MG/DL
CHLORIDE SERPL-SCNC: 102 MMOL/L
CO2 SERPL-SCNC: 23 MMOL/L
CREAT SERPL-MCNC: 1.14 MG/DL
EOSINOPHIL # BLD AUTO: 0.43 K/UL
EOSINOPHIL NFR BLD AUTO: 6.5 %
ESTIMATED AVERAGE GLUCOSE: 137 MG/DL
GLUCOSE SERPL-MCNC: 207 MG/DL
HBA1C MFR BLD HPLC: 6.4 %
HCT VFR BLD CALC: 42.9 %
HGB BLD-MCNC: 13.2 G/DL
IMM GRANULOCYTES NFR BLD AUTO: 0.6 %
LPL SERPL-CCNC: 276 U/L
LYMPHOCYTES # BLD AUTO: 0.94 K/UL
LYMPHOCYTES NFR BLD AUTO: 14.2 %
MAN DIFF?: NORMAL
MCHC RBC-ENTMCNC: 28.9 PG
MCHC RBC-ENTMCNC: 30.8 GM/DL
MCV RBC AUTO: 93.9 FL
MONOCYTES # BLD AUTO: 0.6 K/UL
MONOCYTES NFR BLD AUTO: 9.1 %
NEUTROPHILS # BLD AUTO: 4.5 K/UL
NEUTROPHILS NFR BLD AUTO: 68.2 %
PLATELET # BLD AUTO: 255 K/UL
POTASSIUM SERPL-SCNC: 4.1 MMOL/L
PROT SERPL-MCNC: 5.9 G/DL
RBC # BLD: 4.57 M/UL
RBC # FLD: 14 %
SODIUM SERPL-SCNC: 136 MMOL/L
WBC # FLD AUTO: 6.6 K/UL

## 2020-01-23 ENCOUNTER — RX RENEWAL (OUTPATIENT)
Age: 65
End: 2020-01-23

## 2020-01-24 ENCOUNTER — APPOINTMENT (OUTPATIENT)
Dept: INTERNAL MEDICINE | Facility: CLINIC | Age: 65
End: 2020-01-24
Payer: COMMERCIAL

## 2020-01-24 VITALS
DIASTOLIC BLOOD PRESSURE: 96 MMHG | HEIGHT: 68 IN | TEMPERATURE: 98.1 F | WEIGHT: 315 LBS | SYSTOLIC BLOOD PRESSURE: 154 MMHG | BODY MASS INDEX: 47.74 KG/M2 | HEART RATE: 84 BPM | OXYGEN SATURATION: 95 %

## 2020-01-24 DIAGNOSIS — R10.9 UNSPECIFIED ABDOMINAL PAIN: ICD-10-CM

## 2020-01-24 PROCEDURE — 99214 OFFICE O/P EST MOD 30 MIN: CPT | Mod: 25,GC

## 2020-01-25 DIAGNOSIS — R10.11 RIGHT UPPER QUADRANT PAIN: ICD-10-CM

## 2020-01-25 DIAGNOSIS — R10.9 UNSPECIFIED ABDOMINAL PAIN: ICD-10-CM

## 2020-01-25 DIAGNOSIS — Z79.84 LONG TERM (CURRENT) USE OF ORAL HYPOGLYCEMIC DRUGS: ICD-10-CM

## 2020-01-25 DIAGNOSIS — E11.9 TYPE 2 DIABETES MELLITUS WITHOUT COMPLICATIONS: ICD-10-CM

## 2020-01-30 ENCOUNTER — APPOINTMENT (OUTPATIENT)
Dept: GASTROENTEROLOGY | Facility: CLINIC | Age: 65
End: 2020-01-30
Payer: COMMERCIAL

## 2020-01-30 VITALS
TEMPERATURE: 97.7 F | BODY MASS INDEX: 47.19 KG/M2 | SYSTOLIC BLOOD PRESSURE: 145 MMHG | WEIGHT: 315 LBS | DIASTOLIC BLOOD PRESSURE: 80 MMHG | OXYGEN SATURATION: 96 % | HEIGHT: 68.5 IN | RESPIRATION RATE: 15 BRPM | HEART RATE: 93 BPM

## 2020-01-30 PROCEDURE — 99214 OFFICE O/P EST MOD 30 MIN: CPT

## 2020-01-31 NOTE — ASSESSMENT
[FreeTextEntry1] : 62 YO M morbid obesity, UC dx 2005, IDDM, HLD. w/ sigmoidectomy (due to diverticular abcess) and hernia repair in 2010. Colonoscopy performed demonstrated diffuse diverticulosis without evidence of active UC (path with focal active inflammation). Unclear origin of patients abd pain but most likely 2/2 hernias. Given the description of symptoms (episodic sharp abd pain located thru out colon) and normal colon unlikely IBD related symptoms. Given obvious hernia (contribution from prior incision for sigmoid resection and obesity) was referred to surgery however Dr. Douglas advised patient lose weight primarily prior to surgery. He has lost ~ 15 lbs with nutritionist.\par Recently had abdominal pain with + lipase.  \par \par 1. UC\par - cbc, cmp and crp today \par - Colonoscopy 10/17 -- with negative path\par - Pt instructed to obtain previous C-scope and Path to determine chronicity of disease prior to initiating CRC surveillance q1-2yrs - will request from Dr. Gage \par - fecal calprotectin repeated  \par \par \par 2.  Pancreatitis:\par ?type I/II autoimmune vs. stone (despite being s/p nati)\par check IgG4\par getting CT scan ordered by PMD to evaluate pancreatitis. \par - for now OK to be off balsalazide, but given his 10yrs of exposure, doubt related to this drug. He's also continues on an antipsychotic that maybe associated drug.  He denies etoh exp beyond 1glass white wine/night, but has stopped that also.\par \par \par 3. Abd hernia\par - will go back to Dr. Douglas once he loses more weight ; continue to f/u with nutritionist\par - miralax prn \par \par Follow up after CT Scan\par \par Patient seen and discussed with Dr. Thomson\par \par Paula Ziegler MD\par IBD Fellow

## 2020-01-31 NOTE — HISTORY OF PRESENT ILLNESS
[Heartburn] : denies heartburn [Nausea] : denies nausea [Vomiting] : denies vomiting [Diarrhea] : diarrhea worsened [Constipation] : denies constipation [Yellow Skin Or Eyes (Jaundice)] : denies jaundice [Abdominal Pain] : abdominal pain worsened [Abdominal Swelling] : denies abdominal swelling [Rectal Pain] : denies rectal pain [Inflammatory Bowel Disease] : inflammatory bowel disease [Appendectomy] : appendectomy [Cholecystectomy] : cholecystectomy [de-identified] : 64 year old male with pmhx of UC (dx in '05 on balsalazide), in clinical remission.  recent + lipase/abdominal pain.\par \par UC: \par patient reports in 2010 he went for a colonoscopy and had a surgical resection of a large diverticulum (part of sigmoid resected). He had an abscess filled with fecal matter. Post op wound infection is why he has so many incisional hernias. That day also had GB removed. \par \par stopped balasalazide 3 days ago because PMD told him to stop the med as he deemed it potential culprit for pancreatitis.\par \par \par Patient presents to GI clinic today for follow up, feeling overall well. Does cont to have abd pain 2/2 two hernias in abdomen. Saw Dr. Douglas who referred him to bariatric surgery because the risk of elective hernia repair is greater than not doing it due to DM and morbid obesity. Has lost 15 lbs with nutritionist on 2nd floor. Dr. Butler will not do bariatric at this time either. \par \par Last cscope October 2017 revealed no e/o active colitis, however exudate and erythema at IC valve and TI with questionable polyps. PATH - TI with focal acute inflammation, IC valve acute inflammation. \par \par Pt continues to endorse episodic abd pain at hernia site. Worse with exercising. Pain usually lasts 1-2 hours and self resolves. He denies n/v, f/c. Endorses having 1-2 normal BM's daily without blood or mucous. However since starting [employer] program and adding more fiber to diet, he has had more irregular bowel movements. Having 3 days without BM, then going 2x/day with large quantities of stool. Reports on day to day basis, diet does vary. Used miralax a few weeks ago with relief. \par \par Colonoscopy 10/2017: diffuse diverticulosis, IC had adherent exudate without ulcerations, TI had psuedopolyps\par Path:  focal acute inflammation, otherwise normal path.  \par -----------------------------------------------------------------------------------------------------------------\par HPI: 60 YO M, hx UC dx ~ 2005, IDDM, HLD, sigmoidectomy (?why), cholecystectomy, hernia repair and appendectomy 2010, currently on balsalazide. Pt had been under care of Dr. Gage but wanted to get second opinion and establish care. \par \par Pt presents today with intermittent abdominal pain x 1 year in the epigastric region as well as in his lower abdomen. He denies association with foods, and denies aggravating or alleviating factors. Pt having 2-3 BMs per day, [place] 6-7, he often goes to the bathroom and then has to run back to go to have another BM. Pt denies blood in stool, fevers, chills, or nausea. \par \par Unable to provide great detail about his UC; \par EIM - none\par \par Fam hx: no CRC\par Social hx: no tobacco use, infrequent alcohol use, aspirin use on occasion (< once/week) \par

## 2020-01-31 NOTE — PHYSICAL EXAM
[General Appearance - Alert] : alert [General Appearance - In No Acute Distress] : in no acute distress [Sclera] : the sclera and conjunctiva were normal [Outer Ear] : the ears and nose were normal in appearance [Neck Appearance] : the appearance of the neck was normal [] : no respiratory distress [Bowel Sounds] : normal bowel sounds [Abdomen Soft] : soft [Abdomen Tenderness] : non-tender [No CVA Tenderness] : no ~M costovertebral angle tenderness [Abnormal Walk] : normal gait [Skin Color & Pigmentation] : normal skin color and pigmentation [Oriented To Time, Place, And Person] : oriented to person, place, and time [No Focal Deficits] : no focal deficits [FreeTextEntry1] : EXTREMELY large ventral hernia x2, not strangulated.

## 2020-01-31 NOTE — CONSULT LETTER
[Dear  ___] : Dear  [unfilled], [Courtesy Letter:] : I had the pleasure of seeing your patient, [unfilled], in my office today. [Sincerely,] : Sincerely, [Please see my note below.] : Please see my note below. [FreeTextEntry3] : Wagner Thomson MD\par Associate Professor of Medicine\par Director IBD Program\par [facility]\par

## 2020-02-03 LAB — IGG4 SER-MCNC: 36.5 MG/DL

## 2020-02-04 ENCOUNTER — TRANSCRIPTION ENCOUNTER (OUTPATIENT)
Age: 65
End: 2020-02-04

## 2020-02-04 LAB
LPL SERPL-CCNC: 94 U/L
TRIGL SERPL-MCNC: 201 MG/DL

## 2020-02-13 ENCOUNTER — TRANSCRIPTION ENCOUNTER (OUTPATIENT)
Age: 65
End: 2020-02-13

## 2020-02-21 NOTE — ED ADULT NURSE NOTE - CAS EDN INTEG ASSESS
Pharmacist called back and states she is no longer on pain meds, they were for surgery only and they would like to okay the refill of stadol.  Verbalized this to  and she is okay with this.  dEe took verbal aurthorization.    WDL

## 2020-03-03 ENCOUNTER — RX RENEWAL (OUTPATIENT)
Age: 65
End: 2020-03-03

## 2020-03-05 ENCOUNTER — RX RENEWAL (OUTPATIENT)
Age: 65
End: 2020-03-05

## 2020-03-11 ENCOUNTER — RX RENEWAL (OUTPATIENT)
Age: 65
End: 2020-03-11

## 2020-03-19 ENCOUNTER — APPOINTMENT (OUTPATIENT)
Dept: GASTROENTEROLOGY | Facility: CLINIC | Age: 65
End: 2020-03-19
Payer: COMMERCIAL

## 2020-03-19 DIAGNOSIS — K51.90 ULCERATIVE COLITIS, UNSPECIFIED, W/OUT COMPLICATIONS: ICD-10-CM

## 2020-03-19 PROCEDURE — 99214 OFFICE O/P EST MOD 30 MIN: CPT | Mod: 95

## 2020-03-19 NOTE — PHYSICAL EXAM
[General Appearance - Alert] : alert [General Appearance - In No Acute Distress] : in no acute distress [Sclera] : the sclera and conjunctiva were normal [Outer Ear] : the ears and nose were normal in appearance [Neck Appearance] : the appearance of the neck was normal [] : no respiratory distress [No CVA Tenderness] : no ~M costovertebral angle tenderness [Abnormal Walk] : normal gait [Skin Color & Pigmentation] : normal skin color and pigmentation [No Focal Deficits] : no focal deficits [Oriented To Time, Place, And Person] : oriented to person, place, and time

## 2020-03-24 NOTE — HISTORY OF PRESENT ILLNESS
[Inflammatory Bowel Disease] : inflammatory bowel disease [Appendectomy] : appendectomy [Cholecystectomy] : cholecystectomy [Home] : at home, [unfilled] , at the time of the visit. [Clinic: ([facility])___] : at the clinic in [unfilled] [Patient, Provider & Others:  (Enter provider's Role) ____] : The patient, [unfilled], [unfilled] ~ecp~  [de-identified] : 64 year old male with pmhx of UC (dx in '05 on balsalazide), in clinical remission.  recent + lipase/abdominal pain, however CT scan normal. Telemedicine visit conducted to go over CT results and disease management. \par \par From UC perspective, he is doing well. He stopped balasalazide about a month ago because PMD told him to stop the med as he deemed it potential culprit for pancreatitis. He has not had any GI complaints except for abdominal pain with movement 2/2 large hernias. Saw Dr. Douglas who referred him to bariatric surgery because the risk of elective hernia repair is greater than not doing it due to DM and morbid obesity. Has lost 15 lbs with nutritionist on 2nd floor. Dr. Butler will not do bariatric at this time either. He is considering medical weight loss but fears ADRs from multiple weight loss drugs. He is unable to use a binder as he can't find one that properly fits. Has been self-isolating at home since coronavirus outbreak. Denies fevers/chills or cough. No blood in his stools. No n/v.\par \par HPI: patient reports in 2010 he went for a colonoscopy and had a surgical resection of a large diverticulum (part of sigmoid resected). He had an abscess filled with fecal matter. Post op wound infection is why he has so many incisional hernias. That day also had GB removed. \par \par CT scan Feb 2020: large abdominal hernias and kidney cysts, no pancreatitis \par Last Cscope May 2019: no active colitis; pseudopolyps in TI \par Cscope October 2017 revealed no e/o active colitis, however exudate and erythema at IC valve and TI with questionable polyps. diffuse diverticulosis. PATH - TI with focal acute inflammation, IC valve acute inflammation. \par Path:  focal acute inflammation, otherwise normal path.  \par -----------------------------------------------------------------------------------------------------------------\par HPI: 62 YO M, hx UC dx ~ 2005, IDDM, HLD, sigmoidectomy (?why), cholecystectomy, hernia repair and appendectomy 2010, currently on balsalazide. Pt had been under care of Dr. Ggae but wanted to get second opinion and establish care. \par \par Pt presents today with intermittent abdominal pain x 1 year in the epigastric region as well as in his lower abdomen. He denies association with foods, and denies aggravating or alleviating factors. Pt having 2-3 BMs per day, [place] 6-7, he often goes to the bathroom and then has to run back to go to have another BM. Pt denies blood in stool, fevers, chills, or nausea. \par \par Unable to provide great detail about his UC; \par EIM - none\par \par Fam hx: no CRC\par Social hx: no tobacco use, infrequent alcohol use, aspirin use on occasion (< once/week) \par

## 2020-03-24 NOTE — ASSESSMENT
[FreeTextEntry1] : 65 YO M morbid obesity, UC dx 2005, IDDM, HLD. w/ sigmoidectomy (due to diverticular abcess) and hernia repair in 2010. Colonoscopy performed demonstrated diffuse diverticulosis without evidence of active UC (path with focal active inflammation). Unclear origin of patients abd pain but most likely 2/2 hernias. Given the description of symptoms (episodic sharp abd pain located thru out colon) and normal colon unlikely IBD related symptoms. Given obvious hernia (contribution from prior incision for sigmoid resection and obesity) was referred to surgery however Dr. Douglas advised patient lose weight primarily prior to surgery. He has lost ~ 15 lbs with nutritionist and is not a candidate for bariatric hernia as per Dr. Butler due to hernias. \par Recently had abdominal pain with + lipase, now resolved with normal CT scan. \par \par 1. UC\par - remain off of balsalazide for now, continue to monitor symptoms \par - Colonoscopy May 2019 with negative path and no active colitis\par - Pt instructed to obtain previous C-scope and Path to determine chronicity of disease prior to initiating CRC surveillance q1-2yrs - will request from Dr. Gage \par - fecal calprotectin to be used as monitoring for inflammation noninvasively \par \par 2.  Pancreatitis:\par - CT scan normal \par - lipase improving\par - Igg4 negative\par - for now OK to be off balsalazide, but given his 10yrs of exposure, doubt related to this drug. He's also continues on an antipsychotic that maybe associated drug.  He denies etoh exp beyond 1glass white wine/night, but has stopped that also.\par \par 3. Abd hernia\par - will go back to Dr. Douglas once he loses more weight ; continue to f/u with nutritionist and endocrinologist \par - miralax prn \par \par F/U via telemed or RTC in 4 months

## 2020-03-24 NOTE — CONSULT LETTER
[Dear  ___] : Dear  [unfilled], [Courtesy Letter:] : I had the pleasure of seeing your patient, [unfilled], in my office today. [Please see my note below.] : Please see my note below. [Sincerely,] : Sincerely, [FreeTextEntry3] : Wagner Thomson MD\par Associate Professor of Medicine\par Director IBD Program\par [facility]\par

## 2020-04-03 ENCOUNTER — RX RENEWAL (OUTPATIENT)
Age: 65
End: 2020-04-03

## 2020-04-20 PROBLEM — H61.23 BILATERAL IMPACTED CERUMEN: Status: RESOLVED | Noted: 2019-06-02 | Resolved: 2020-04-20

## 2020-04-20 PROBLEM — J32.8 OTHER CHRONIC SINUSITIS: Status: ACTIVE | Noted: 2018-07-05

## 2020-04-20 PROBLEM — R51 LEFT FACIAL PAIN: Status: ACTIVE | Noted: 2020-04-20

## 2020-04-20 PROBLEM — J33.9 NASAL POLYP: Status: ACTIVE | Noted: 2018-05-16

## 2020-04-20 NOTE — HISTORY OF PRESENT ILLNESS
[de-identified] : Mr. THOMAS was seen in f/up for nasal polyps and chronic sinusitis.\par \par He reports frequent pain in left side nose, just above the cartilage, over the past 2 months. This pain got better after had nasal polypectomy performed at end of June 2019.\par He has occasional headache in left ethmoid area also \par He has known nasal polyps since July 2018.\par He has been using Fluticasone daily. Never treated with oral steroids due to his diabetes.\par Has allergies to cats, dogs and mold.\par \par \par \par CT SINUS noncontrast (7/20/2018) at [facility]:\par -- Moderate mucosal thickening with polypoid densities involving the frontal sinus ostia and there is opacification of the frontal recesses. \par -- Moderate mucosal thickening involving the ethmoid air cells. There is flattening of the left fovea ethmoidalis, consistent with normal anatomic variation. \par -- Mild circumferential mucosal thickening involving the sphenoid sinuses with opacification of the right sphenoethmoidal recess and severe narrowing of the left sphenoethmoidal recess. \par -- Large volume of mucosal thickening/polypoid density involving the maxillary sinuses with almost complete opacification of the right maxillary sinus. There is opacification of the ostiomeatal units.\par -- Mild rightward deviation of the nasal septum. Multiple polypoid mucosal densities within the right middle meatus and left middle meatus, one of the polypoid mucosal densities within the right middle meatus contacting the right inferior turbinate. There is partial opacification of the superior nasal airways. \par -- Right mastoid air cells are hypopneumatized. There is opacification of multiple inferior left mastoid air cells. There is narrowing of the external auditory canals. \par -- Degenerative changes involving the temporomandibular joints. Degenerative changes involving the C1-C2 joint.\par \par

## 2020-04-20 NOTE — PROCEDURE
[FreeTextEntry6] : \par Indication:  chronic sinusitis\par -Verbal consent was obtained from patient prior to exam. \par - Charles-Synephrine and lidocaine 2% spray applied to nose bilaterally.\par Nasal endoscopy was performed with flexible  scope.\par Findings: \par -- Inferior turbinates normal  bilateral.  Inferior meatus clear bilateral.\par -- Septum was   deviated   to the right   \par -- No polyps either side nose\par -- Mucus clear bilateral\par -- Middle and superior turbinates normal bilateral\par -- Middle meatus with small polyps  bilateral.  SER clear bilateral.\par -- Nasopharynx without mass or exudate\par -- Adenoids were absent\par -- Eustachian orifices were clear bilateral\par \par The patient tolerated the procedure well.\par \par

## 2020-04-22 ENCOUNTER — RX RENEWAL (OUTPATIENT)
Age: 65
End: 2020-04-22

## 2020-04-29 ENCOUNTER — TRANSCRIPTION ENCOUNTER (OUTPATIENT)
Age: 65
End: 2020-04-29

## 2020-05-20 ENCOUNTER — RX RENEWAL (OUTPATIENT)
Age: 65
End: 2020-05-20

## 2020-06-17 ENCOUNTER — RX RENEWAL (OUTPATIENT)
Age: 65
End: 2020-06-17

## 2020-06-22 ENCOUNTER — RX RENEWAL (OUTPATIENT)
Age: 65
End: 2020-06-22

## 2020-06-29 ENCOUNTER — RX RENEWAL (OUTPATIENT)
Age: 65
End: 2020-06-29

## 2020-07-08 ENCOUNTER — APPOINTMENT (OUTPATIENT)
Dept: INTERNAL MEDICINE | Facility: CLINIC | Age: 65
End: 2020-07-08
Payer: MEDICARE

## 2020-07-08 ENCOUNTER — RESULT CHARGE (OUTPATIENT)
Age: 65
End: 2020-07-08

## 2020-07-08 VITALS
HEART RATE: 100 BPM | SYSTOLIC BLOOD PRESSURE: 144 MMHG | DIASTOLIC BLOOD PRESSURE: 84 MMHG | BODY MASS INDEX: 47.74 KG/M2 | TEMPERATURE: 98.6 F | HEIGHT: 68 IN | OXYGEN SATURATION: 96 % | WEIGHT: 315 LBS

## 2020-07-08 DIAGNOSIS — R01.1 CARDIAC MURMUR, UNSPECIFIED: ICD-10-CM

## 2020-07-08 DIAGNOSIS — M25.473 EFFUSION, UNSPECIFIED ANKLE: ICD-10-CM

## 2020-07-08 LAB — HBA1C MFR BLD HPLC: 7

## 2020-07-08 PROCEDURE — 83036 HEMOGLOBIN GLYCOSYLATED A1C: CPT | Mod: QW

## 2020-07-08 PROCEDURE — 99214 OFFICE O/P EST MOD 30 MIN: CPT | Mod: GC,25

## 2020-07-15 ENCOUNTER — RX RENEWAL (OUTPATIENT)
Age: 65
End: 2020-07-15

## 2020-07-15 ENCOUNTER — APPOINTMENT (OUTPATIENT)
Dept: OTOLARYNGOLOGY | Facility: CLINIC | Age: 65
End: 2020-07-15

## 2020-07-19 ENCOUNTER — RX RENEWAL (OUTPATIENT)
Age: 65
End: 2020-07-19

## 2020-07-20 RX ORDER — INSULIN LISPRO 100 [IU]/ML
100 INJECTION, SOLUTION INTRAVENOUS; SUBCUTANEOUS
Qty: 5 | Refills: 1 | Status: COMPLETED | COMMUNITY
Start: 2020-07-08 | End: 2020-07-20

## 2020-08-06 ENCOUNTER — FORM ENCOUNTER (OUTPATIENT)
Age: 65
End: 2020-08-06

## 2020-08-07 ENCOUNTER — OUTPATIENT (OUTPATIENT)
Dept: OUTPATIENT SERVICES | Facility: HOSPITAL | Age: 65
LOS: 1 days | End: 2020-08-07
Payer: COMMERCIAL

## 2020-08-07 DIAGNOSIS — I10 ESSENTIAL (PRIMARY) HYPERTENSION: ICD-10-CM

## 2020-08-07 DIAGNOSIS — M25.473 EFFUSION, UNSPECIFIED ANKLE: ICD-10-CM

## 2020-08-07 DIAGNOSIS — R06.00 DYSPNEA, UNSPECIFIED: ICD-10-CM

## 2020-08-07 DIAGNOSIS — R01.1 CARDIAC MURMUR, UNSPECIFIED: ICD-10-CM

## 2020-08-07 DIAGNOSIS — Z90.49 ACQUIRED ABSENCE OF OTHER SPECIFIED PARTS OF DIGESTIVE TRACT: Chronic | ICD-10-CM

## 2020-08-07 DIAGNOSIS — G47.33 OBSTRUCTIVE SLEEP APNEA (ADULT) (PEDIATRIC): ICD-10-CM

## 2020-08-07 PROCEDURE — 93306 TTE W/DOPPLER COMPLETE: CPT | Mod: 26

## 2020-08-07 PROCEDURE — 93306 TTE W/DOPPLER COMPLETE: CPT

## 2020-08-12 ENCOUNTER — RX RENEWAL (OUTPATIENT)
Age: 65
End: 2020-08-12

## 2020-08-21 ENCOUNTER — TRANSCRIPTION ENCOUNTER (OUTPATIENT)
Age: 65
End: 2020-08-21

## 2020-08-25 ENCOUNTER — TRANSCRIPTION ENCOUNTER (OUTPATIENT)
Age: 65
End: 2020-08-25

## 2020-08-26 ENCOUNTER — TRANSCRIPTION ENCOUNTER (OUTPATIENT)
Age: 65
End: 2020-08-26

## 2020-09-09 ENCOUNTER — TRANSCRIPTION ENCOUNTER (OUTPATIENT)
Age: 65
End: 2020-09-09

## 2020-09-09 ENCOUNTER — LABORATORY RESULT (OUTPATIENT)
Age: 65
End: 2020-09-09

## 2020-09-09 ENCOUNTER — RX RENEWAL (OUTPATIENT)
Age: 65
End: 2020-09-09

## 2020-09-09 NOTE — HISTORY OF PRESENT ILLNESS
[FreeTextEntry1] : 12/8/16: Asked to evaluate patient by Dr Philippe for dyspnea and wheeze with a recent colonoscopy cancelled due to wheezing. This started mid-October with dyspnea and chest tightness on walking. Today is not bad. Dr Philippe rx'd albuterol and this helps with the breathing but not the wheezing. Using this 1-2x daily. He has a history of asthma as a teen that remitted after college. Never ED or hospital, can't recall ever taking prednisone for asthma. Took Tedrol. Never smoking. Retired . No pets. He's not dyspneic at night. Uses CPAP for GLADYS x 3-4 yrs. This doesn't feel like his asthma used to feel, to him.\par \par 12/15/16: He started Nasacort which helped with his breathing. He is getting benefit from albuterol. He comes in to review results - see below.\par \par 1/19/17: Seen and examined by me w Dr Swanson. Doing very well on Pulmicort 180mcg bid. He will continue this treatment. We counseled him on symptoms that should prompt re-eval. Otherwise he will f/u in ~3 mos. He is UTD on vaccines.\par \par 4/20/17: Pretty satisfied on Pulmicort and has used albuterol twice. No intercurrent exacerbations. Notes that his breathing isn't perfect but this may be due to his weight.\par \par 10/9/17: In the interim was admitted for an incarcerated hernia which was managed conservatively. Needs clearance for colonoscopy. Doing well from asthma POV: no wheezing, cough, dyspnea. Minimal use of rescue inhaler. No nocturnal awakenings. Using CPAP.\par \par 4/25/18: He feels a little but more dyspneic on stairs. He continues to use Pulmicort faithfully. He occasionally uses albuterol with relief. He goes to the gym and exercises on a recumbent bike. He has a really intractable problem in that he has this enormous hernia which can't safely be operated on because of his obesity, but he can't get laparoscopic bariatric surgery because of the hernia. He is seeing Dr. Douglas and Dr. Roca.\par \par 7/8/19: He doesn't remember whether he noticed a difference changing to Advair, but he is happy with his breathing. He never needs Ventolin or wakes at night. He's had no intercurrent exacerbations. He has had a polypectomy since seeing me last, and has been treated in the ED for a laceration on his knee complicated by antibiotic reaction. He remains on CPAP for GLADYS.

## 2020-09-09 NOTE — ASSESSMENT
[FreeTextEntry1] : Data reviewed:\par \par PA/lat CXR in office 12/15/16: no focal infiltrate or mass\par \par PFT 12/15/16: severe EAO (FEV1 49%) w sig response to IBD but still obstructed c/w GOLD II, normal volumes, mildly reduced DLCO / FENO 81\par Dustin 4/20/17: moderate obstruction, FEV1 55% / FENO 21\par Dustin 10/9/17: mild-mod obstruction, FEV1 70%\par Dustin 4/25/18: mod-sev obstruction, FEV1 56%\par Dustin 7/8/19: restricted, FEV1 77%\par \par Impression:\par Asthma\par \par Plan:\par Cont Advair 250/50 bid and albuterol prn.\par RTO 1 yr / sooner prn.\par Get flu vaccine in fall.\par --\par Echo [facility] 8/2020: mild LVH, tr AR, mild TR\par

## 2020-09-11 ENCOUNTER — APPOINTMENT (OUTPATIENT)
Dept: PULMONOLOGY | Facility: CLINIC | Age: 65
End: 2020-09-11
Payer: MEDICARE

## 2020-09-11 VITALS
WEIGHT: 315 LBS | TEMPERATURE: 98.2 F | SYSTOLIC BLOOD PRESSURE: 134 MMHG | BODY MASS INDEX: 47.74 KG/M2 | OXYGEN SATURATION: 97 % | HEART RATE: 113 BPM | DIASTOLIC BLOOD PRESSURE: 90 MMHG | HEIGHT: 68 IN

## 2020-09-11 PROCEDURE — 90670 PCV13 VACCINE IM: CPT

## 2020-09-11 PROCEDURE — 99214 OFFICE O/P EST MOD 30 MIN: CPT | Mod: 25

## 2020-09-11 PROCEDURE — 94010 BREATHING CAPACITY TEST: CPT

## 2020-09-11 PROCEDURE — G0009: CPT

## 2020-09-11 PROCEDURE — 36415 COLL VENOUS BLD VENIPUNCTURE: CPT

## 2020-09-11 PROCEDURE — 71046 X-RAY EXAM CHEST 2 VIEWS: CPT

## 2020-09-11 NOTE — REVIEW OF SYSTEMS
[Dyspnea] : dyspnea [Recent Wt Gain (___ Lbs)] : ~T recent [unfilled] lb weight gain [Edema] : edema [Chest Discomfort] : no chest discomfort [Orthopnea] : no orthopnea

## 2020-09-11 NOTE — ASSESSMENT
[FreeTextEntry1] : Data reviewed:\par \par Echo [facility] 8/2020: mild LVH, tr AR, mild TR\par \par PA/lat CXR in office 9/11/20: no focal infiltrate, no sig change\par \par PFT 12/15/16: severe EAO (FEV1 49%) w sig resp to IBD but GOLD II, nl TLC, mildly reduced DLCO / FENO 81\par Dustin 4/20/17: moderate obstruction, FEV1 55% / FENO 21\par Dustin 10/9/17: mild-mod obstruction, FEV1 70%\par Dustin 4/25/18: mod-sev obstruction, FEV1 56%\par [place] 7/8/19: restricted, FEV1 77%\par Dustin 9/11/20: restricted, FEV1 73%\par \par Impression:\par Newly worsened dyspnea\par Asthma without evident change in asthma control\par \par Plan:\par His new dyspnea may be due to deconditioning and weight gain, but exclude other etiologies. Labs, and if normal, cardiology consultation, consideration of stress.\par Cont flut-salm 250/50 bid.\par [id number] given today and can get high dose flu at next primary care.

## 2020-09-11 NOTE — HISTORY OF PRESENT ILLNESS
[TextBox_4] : My patient since 2016 w asthma maintained on generic flutic/salm 250/50. He has a large hernia which can't be operated on because of obesity, and he can't get laparoscopic bariatric surgery because of the hernia. Never smoker.\par \par 9/11/20: Much more dyspneic on minimal exertion, doesn't feel like asthma, associated w throat discomfort, no chest pain, arm or jaw pain. Walked here from [address], had to stop every 1/2 block. Changed to the generic inhaler without any change. Not wheezing, not coughing, no rest dyspnea. No orthopnea. Last stress test 5 years ago. Much less physically active during pandemic. Probably gained 20 lbs.

## 2020-09-11 NOTE — PHYSICAL EXAM
[No Acute Distress] : no acute distress [Normal Rate/Rhythm] : normal rate/rhythm [No Resp Distress] : no resp distress [No Murmurs] : no murmurs [Normal S1, S2] : normal s1, s2 [Clear to Auscultation Bilaterally] : clear to auscultation bilaterally

## 2020-09-14 LAB
ALBUMIN SERPL ELPH-MCNC: 4.5 G/DL
ALP BLD-CCNC: 91 U/L
ALT SERPL-CCNC: 14 U/L
ANION GAP SERPL CALC-SCNC: 18 MMOL/L
AST SERPL-CCNC: 24 U/L
BASOPHILS # BLD AUTO: 0.13 K/UL
BASOPHILS NFR BLD AUTO: 1.6 %
BILIRUB SERPL-MCNC: 0.3 MG/DL
BUN SERPL-MCNC: 20 MG/DL
CALCIUM SERPL-MCNC: 9.9 MG/DL
CHLORIDE SERPL-SCNC: 104 MMOL/L
CO2 SERPL-SCNC: 21 MMOL/L
CREAT SERPL-MCNC: 1.2 MG/DL
EOSINOPHIL # BLD AUTO: 0.48 K/UL
EOSINOPHIL NFR BLD AUTO: 5.8 %
GLUCOSE SERPL-MCNC: 82 MG/DL
HCT VFR BLD CALC: 45.6 %
HGB BLD-MCNC: 14.2 G/DL
IMM GRANULOCYTES NFR BLD AUTO: 0.6 %
LYMPHOCYTES # BLD AUTO: 1.4 K/UL
LYMPHOCYTES NFR BLD AUTO: 17 %
MAN DIFF?: NORMAL
MCHC RBC-ENTMCNC: 28.9 PG
MCHC RBC-ENTMCNC: 31.1 GM/DL
MCV RBC AUTO: 92.7 FL
MONOCYTES # BLD AUTO: 0.8 K/UL
MONOCYTES NFR BLD AUTO: 9.7 %
NEUTROPHILS # BLD AUTO: 5.39 K/UL
NEUTROPHILS NFR BLD AUTO: 65.3 %
PLATELET # BLD AUTO: 275 K/UL
POTASSIUM SERPL-SCNC: 4.8 MMOL/L
PROT SERPL-MCNC: 6.9 G/DL
RBC # BLD: 4.92 M/UL
RBC # FLD: 14.4 %
SODIUM SERPL-SCNC: 143 MMOL/L
WBC # FLD AUTO: 8.25 K/UL

## 2020-09-15 ENCOUNTER — TRANSCRIPTION ENCOUNTER (OUTPATIENT)
Age: 65
End: 2020-09-15

## 2020-09-24 ENCOUNTER — RX RENEWAL (OUTPATIENT)
Age: 65
End: 2020-09-24

## 2020-10-14 ENCOUNTER — RX RENEWAL (OUTPATIENT)
Age: 65
End: 2020-10-14

## 2020-10-16 ENCOUNTER — APPOINTMENT (OUTPATIENT)
Dept: HEART AND VASCULAR | Facility: CLINIC | Age: 65
End: 2020-10-16
Payer: MEDICARE

## 2020-10-16 ENCOUNTER — NON-APPOINTMENT (OUTPATIENT)
Age: 65
End: 2020-10-16

## 2020-10-16 VITALS
TEMPERATURE: 99.2 F | SYSTOLIC BLOOD PRESSURE: 140 MMHG | BODY MASS INDEX: 47.74 KG/M2 | HEIGHT: 68 IN | WEIGHT: 315 LBS | DIASTOLIC BLOOD PRESSURE: 80 MMHG | HEART RATE: 94 BPM | OXYGEN SATURATION: 97 %

## 2020-10-16 PROCEDURE — 93000 ELECTROCARDIOGRAM COMPLETE: CPT

## 2020-10-16 PROCEDURE — 99203 OFFICE O/P NEW LOW 30 MIN: CPT

## 2020-10-16 NOTE — ASSESSMENT
[FreeTextEntry1] : BANKS-  Must exclude CAD.  He also reports neck discomfort with exertion. Plan is to have pt perform a Pharm Nuc stress test at [facility] where the weight limit is higher than on our camera.  If Nuc is abnormal then I would favor a cardiac cath.  Continue Lipitor 40 mg.

## 2020-10-16 NOTE — REVIEW OF SYSTEMS
[Dyspnea on exertion] : dyspnea during exertion [Feeling Fatigued] : feeling fatigued [Wheezing] : wheezing [Negative] : Endocrine

## 2020-10-16 NOTE — PHYSICAL EXAM
[Normal Appearance] : normal appearance [Well Groomed] : well groomed [General Appearance - In No Acute Distress] : no acute distress [No Deformities] : no deformities [Eyelids - No Xanthelasma] : the eyelids demonstrated no xanthelasmas [Normal Conjunctiva] : the conjunctiva exhibited no abnormalities [Heart Rate And Rhythm] : heart rate and rhythm were normal [Heart Sounds] : normal S1 and S2 [Exaggerated Use Of Accessory Muscles For Inspiration] : no accessory muscle use [Respiration, Rhythm And Depth] : normal respiratory rhythm and effort [Auscultation Breath Sounds / Voice Sounds] : lungs were clear to auscultation bilaterally [Abdomen Tenderness] : non-tender [Abdomen Soft] : soft [Abdomen Mass (___ Cm)] : no abdominal mass palpated [Nail Clubbing] : no clubbing of the fingernails [Petechial Hemorrhages (___cm)] : no petechial hemorrhages [Cyanosis, Localized] : no localized cyanosis [Skin Color & Pigmentation] : normal skin color and pigmentation [] : no rash [No Venous Stasis] : no venous stasis [No Skin Ulcers] : no skin ulcer [No Xanthoma] : no  xanthoma was observed [FreeTextEntry1] : 2 hernias

## 2020-10-16 NOTE — REASON FOR VISIT
[FreeTextEntry1] : 64 y/o M with BANKS.  He has to stop when walking.  Even getting dressed causes BANKS.  He does note neck discomfort, better with rest.  Pt has never smoked.  CRFs include HTN, HLD, DM and he also has GLADYS.  \par \par EKG: NSR, PVC, left anterior hemiblock, possible ASWMI,  no ST-Tw abn. 10/16/20

## 2020-11-04 ENCOUNTER — FORM ENCOUNTER (OUTPATIENT)
Age: 65
End: 2020-11-04

## 2020-11-04 ENCOUNTER — APPOINTMENT (OUTPATIENT)
Dept: INTERNAL MEDICINE | Facility: CLINIC | Age: 65
End: 2020-11-04
Payer: MEDICARE

## 2020-11-04 ENCOUNTER — MED ADMIN CHARGE (OUTPATIENT)
Age: 65
End: 2020-11-04

## 2020-11-04 VITALS
HEART RATE: 86 BPM | OXYGEN SATURATION: 96 % | BODY MASS INDEX: 47.74 KG/M2 | TEMPERATURE: 99.1 F | WEIGHT: 315 LBS | RESPIRATION RATE: 16 BRPM | SYSTOLIC BLOOD PRESSURE: 139 MMHG | DIASTOLIC BLOOD PRESSURE: 85 MMHG | HEIGHT: 68 IN

## 2020-11-04 DIAGNOSIS — E66.01 MORBID (SEVERE) OBESITY DUE TO EXCESS CALORIES: ICD-10-CM

## 2020-11-04 PROCEDURE — 36415 COLL VENOUS BLD VENIPUNCTURE: CPT

## 2020-11-04 PROCEDURE — 90662 IIV NO PRSV INCREASED AG IM: CPT

## 2020-11-04 PROCEDURE — G0008: CPT

## 2020-11-04 PROCEDURE — 99072 ADDL SUPL MATRL&STAF TM PHE: CPT

## 2020-11-04 PROCEDURE — 99214 OFFICE O/P EST MOD 30 MIN: CPT | Mod: 25,GC

## 2020-11-05 ENCOUNTER — OUTPATIENT (OUTPATIENT)
Dept: OUTPATIENT SERVICES | Facility: HOSPITAL | Age: 65
LOS: 1 days | End: 2020-11-05
Payer: COMMERCIAL

## 2020-11-05 DIAGNOSIS — R06.09 OTHER FORMS OF DYSPNEA: ICD-10-CM

## 2020-11-05 DIAGNOSIS — Z90.49 ACQUIRED ABSENCE OF OTHER SPECIFIED PARTS OF DIGESTIVE TRACT: Chronic | ICD-10-CM

## 2020-11-05 LAB
CREAT SPEC-SCNC: 208 MG/DL
MICROALBUMIN 24H UR DL<=1MG/L-MCNC: 1.9 MG/DL
MICROALBUMIN/CREAT 24H UR-RTO: 9 MG/G

## 2020-11-05 PROCEDURE — 78452 HT MUSCLE IMAGE SPECT MULT: CPT | Mod: 26

## 2020-11-05 PROCEDURE — 93018 CV STRESS TEST I&R ONLY: CPT

## 2020-11-05 PROCEDURE — 93017 CV STRESS TEST TRACING ONLY: CPT

## 2020-11-05 PROCEDURE — A9505: CPT

## 2020-11-05 PROCEDURE — 82962 GLUCOSE BLOOD TEST: CPT

## 2020-11-05 PROCEDURE — 78452 HT MUSCLE IMAGE SPECT MULT: CPT

## 2020-11-05 PROCEDURE — 93016 CV STRESS TEST SUPVJ ONLY: CPT

## 2020-11-05 PROCEDURE — A9500: CPT

## 2020-11-06 LAB
ALBUMIN SERPL ELPH-MCNC: 4.3 G/DL
ALP BLD-CCNC: 71 U/L
ALT SERPL-CCNC: 16 U/L
ANION GAP SERPL CALC-SCNC: 12 MMOL/L
AST SERPL-CCNC: 21 U/L
BILIRUB SERPL-MCNC: 0.4 MG/DL
BUN SERPL-MCNC: 19 MG/DL
CALCIUM SERPL-MCNC: 9.6 MG/DL
CHLORIDE SERPL-SCNC: 102 MMOL/L
CO2 SERPL-SCNC: 26 MMOL/L
CREAT SERPL-MCNC: 1.11 MG/DL
GLUCOSE SERPL-MCNC: 102 MG/DL
POTASSIUM SERPL-SCNC: 4.4 MMOL/L
PROT SERPL-MCNC: 6.9 G/DL
SODIUM SERPL-SCNC: 140 MMOL/L

## 2020-11-09 ENCOUNTER — RX RENEWAL (OUTPATIENT)
Age: 65
End: 2020-11-09

## 2020-11-09 LAB
CHOLEST SERPL-MCNC: 154 MG/DL
HDLC SERPL-MCNC: 49 MG/DL
LDLC SERPL CALC-MCNC: 67 MG/DL
NONHDLC SERPL-MCNC: 104 MG/DL
TRIGL SERPL-MCNC: 185 MG/DL

## 2020-11-10 ENCOUNTER — APPOINTMENT (OUTPATIENT)
Dept: HEART AND VASCULAR | Facility: CLINIC | Age: 65
End: 2020-11-10
Payer: MEDICARE

## 2020-11-10 VITALS
TEMPERATURE: 98.1 F | OXYGEN SATURATION: 95 % | WEIGHT: 315 LBS | HEART RATE: 97 BPM | SYSTOLIC BLOOD PRESSURE: 148 MMHG | BODY MASS INDEX: 47.74 KG/M2 | HEIGHT: 68 IN | DIASTOLIC BLOOD PRESSURE: 88 MMHG

## 2020-11-10 DIAGNOSIS — R94.39 ABNORMAL RESULT OF OTHER CARDIOVASCULAR FUNCTION STUDY: ICD-10-CM

## 2020-11-10 PROCEDURE — 36415 COLL VENOUS BLD VENIPUNCTURE: CPT

## 2020-11-10 PROCEDURE — 99072 ADDL SUPL MATRL&STAF TM PHE: CPT

## 2020-11-10 PROCEDURE — 99214 OFFICE O/P EST MOD 30 MIN: CPT

## 2020-11-10 NOTE — REVIEW OF SYSTEMS
[Feeling Fatigued] : feeling fatigued [Dyspnea on exertion] : dyspnea during exertion [Wheezing] : wheezing [Negative] : Heme/Lymph

## 2020-11-10 NOTE — PHYSICAL EXAM
[Normal Appearance] : normal appearance [Well Groomed] : well groomed [No Deformities] : no deformities [General Appearance - In No Acute Distress] : no acute distress [Normal Conjunctiva] : the conjunctiva exhibited no abnormalities [Eyelids - No Xanthelasma] : the eyelids demonstrated no xanthelasmas [Respiration, Rhythm And Depth] : normal respiratory rhythm and effort [Exaggerated Use Of Accessory Muscles For Inspiration] : no accessory muscle use [Auscultation Breath Sounds / Voice Sounds] : lungs were clear to auscultation bilaterally [Heart Rate And Rhythm] : heart rate and rhythm were normal [Heart Sounds] : normal S1 and S2 [Abdomen Soft] : soft [Abdomen Tenderness] : non-tender [Abdomen Mass (___ Cm)] : no abdominal mass palpated [Nail Clubbing] : no clubbing of the fingernails [Cyanosis, Localized] : no localized cyanosis [Petechial Hemorrhages (___cm)] : no petechial hemorrhages [Skin Color & Pigmentation] : normal skin color and pigmentation [] : no rash [No Venous Stasis] : no venous stasis [No Skin Ulcers] : no skin ulcer [No Xanthoma] : no  xanthoma was observed [FreeTextEntry1] : skin tags

## 2020-11-10 NOTE — ASSESSMENT
[FreeTextEntry1] : BANKS-  Must exclude CAD.  He also reports neck discomfort with exertion. Plan is to have pt perform a Pharm Nuc stress test at [facility] where the weight limit is higher than on our camera.  If Nuc is abnormal then I would favor a cardiac cath.  Continue Lipitor 40 mg.  Nuc is abnormal revealing a prior infarct with audrey-infarct ischemia of the apex, inferior and inferolateral walls.  Cath discussed in detail.  Pt agrees.

## 2020-11-11 LAB
ALBUMIN SERPL ELPH-MCNC: 4.4 G/DL
ALP BLD-CCNC: 113 U/L
ALT SERPL-CCNC: 28 U/L
ANION GAP SERPL CALC-SCNC: 15 MMOL/L
APPEARANCE: CLEAR
APTT BLD: 33.4 SEC
AST SERPL-CCNC: 18 U/L
BASOPHILS # BLD AUTO: 0.09 K/UL
BASOPHILS NFR BLD AUTO: 1 %
BILIRUB SERPL-MCNC: 0.4 MG/DL
BILIRUBIN URINE: NEGATIVE
BLOOD URINE: NEGATIVE
BUN SERPL-MCNC: 17 MG/DL
CALCIUM SERPL-MCNC: 9.9 MG/DL
CHLORIDE SERPL-SCNC: 103 MMOL/L
CO2 SERPL-SCNC: 25 MMOL/L
COLOR: YELLOW
CREAT SERPL-MCNC: 1.23 MG/DL
EOSINOPHIL # BLD AUTO: 0.6 K/UL
EOSINOPHIL NFR BLD AUTO: 6.5 %
ESTIMATED AVERAGE GLUCOSE: 148 MG/DL
GLUCOSE QUALITATIVE U: NEGATIVE
GLUCOSE SERPL-MCNC: 94 MG/DL
HBA1C MFR BLD HPLC: 6.8 %
HCT VFR BLD CALC: 45.4 %
HGB BLD-MCNC: 14.1 G/DL
IMM GRANULOCYTES NFR BLD AUTO: 0.8 %
INR PPP: 1.03 RATIO
KETONES URINE: NEGATIVE
LEUKOCYTE ESTERASE URINE: NEGATIVE
LYMPHOCYTES # BLD AUTO: 1.75 K/UL
LYMPHOCYTES NFR BLD AUTO: 19.1 %
MAN DIFF?: NORMAL
MCHC RBC-ENTMCNC: 29 PG
MCHC RBC-ENTMCNC: 31.1 GM/DL
MCV RBC AUTO: 93.2 FL
MONOCYTES # BLD AUTO: 0.97 K/UL
MONOCYTES NFR BLD AUTO: 10.6 %
NEUTROPHILS # BLD AUTO: 5.7 K/UL
NEUTROPHILS NFR BLD AUTO: 62 %
NITRITE URINE: NEGATIVE
PH URINE: 5
PLATELET # BLD AUTO: 326 K/UL
POTASSIUM SERPL-SCNC: 4.5 MMOL/L
PROT SERPL-MCNC: 6.9 G/DL
PROTEIN URINE: NORMAL
PT BLD: 12.1 SEC
RBC # BLD: 4.87 M/UL
RBC # FLD: 14.2 %
SODIUM SERPL-SCNC: 143 MMOL/L
SPECIFIC GRAVITY URINE: 1.03
UROBILINOGEN URINE: NORMAL
WBC # FLD AUTO: 9.18 K/UL

## 2020-11-16 ENCOUNTER — LABORATORY RESULT (OUTPATIENT)
Age: 65
End: 2020-11-16

## 2020-11-18 VITALS
RESPIRATION RATE: 16 BRPM | WEIGHT: 315 LBS | SYSTOLIC BLOOD PRESSURE: 153 MMHG | DIASTOLIC BLOOD PRESSURE: 70 MMHG | HEART RATE: 82 BPM | TEMPERATURE: 98 F | OXYGEN SATURATION: 97 % | HEIGHT: 69 IN

## 2020-11-18 RX ORDER — METFORMIN HYDROCHLORIDE 850 MG/1
2 TABLET ORAL
Qty: 0 | Refills: 0 | DISCHARGE

## 2020-11-18 RX ORDER — CHLORHEXIDINE GLUCONATE 213 G/1000ML
1 SOLUTION TOPICAL ONCE
Refills: 0 | Status: DISCONTINUED | OUTPATIENT
Start: 2020-11-19 | End: 2020-11-19

## 2020-11-18 RX ORDER — DULOXETINE HYDROCHLORIDE 30 MG/1
1 CAPSULE, DELAYED RELEASE ORAL
Qty: 0 | Refills: 0 | DISCHARGE

## 2020-11-18 RX ORDER — QUINAPRIL HYDROCHLORIDE 40 MG/1
2 TABLET, FILM COATED ORAL
Qty: 0 | Refills: 0 | DISCHARGE

## 2020-11-18 NOTE — H&P ADULT - HISTORY OF PRESENT ILLNESS
COVID PCR: Negative 11/16/2020, results in HIE   Cardiologist: Dr. Magdaleno   Pharmacy:   Escort:     64 y/o male w/ PMHx HTN, HLD, type *** DM, and GLADYS (CPAP****) who presented to his outpatient cardiologist, Dr. Juan C Magdaleno, c/o BANKS w/ ambulation of ****. Patient reports he also will experience SOB/BANKS when he is getting dressed in the morning. Patient reports associated neck discomfort and **** , and reports these symptoms are relieved w/ rest. Patient denies dizziness, lightheadedness, chest pain, abdominal pain, nausea/vomiting, melena, LE edema, recent fever, chills, cough, recent travel, or known recent sick contacts. Myocardial Perfusion Scan 11/05/2020 revealed large area of prior infarction in the apical, inferior, and inferolateral wall w/ a small area of mild audrey-infarct ischemia in the inferior wall and EF 50% w/ inferior and inferolateral wall hypokinesis.     In light of patient's risk factors, CCS Class III anginal symptoms, and abnormal myocardial perfusion scan, patient is referred for cardiac catheterization w/ possible intervention if clinically indicated. COVID PCR: Negative 11/16/2020, results in HIE   Cardiologist: Dr. Magdaleno   Pharmacy: [facility], [phone]  Escort: NO ESCORT     64 y/o male w/ PMHx HTN, HLD, type II DM, asthma (no prior intubations), anemia (takes iron at home, no prior transfusions), borderline glaucoma, cataracts (no surgery), GLADYS (on CPAP at home, reports compliance), and umbilical hernia (s/p surgical repair, now recurred) who presented to his outpatient cardiologist, Dr. Juan C Magdaleno, c/o BANKS w/ ambulation of 1-2 blocks. Patient reports he also will experience SOB/BANKS when he is getting dressed in the morning. Patient also reports intermittent central neck discomfort and occasional chest pain, but denies these being directly associated w/ his SOB/BANKS and reports these symptoms are relieved w/ rest. Patient denies dizziness, lightheadedness, chest pain, abdominal pain, nausea/vomiting, melena, LE edema, recent fever, chills, cough, recent travel, or known recent sick contacts. Myocardial Perfusion Scan 11/05/2020 revealed large area of prior infarction in the apical, inferior, and inferolateral wall w/ a small area of mild audrey-infarct ischemia in the inferior wall and EF 50% w/ inferior and inferolateral wall hypokinesis.     In light of patient's risk factors, CCS Class III anginal symptoms, and abnormal myocardial perfusion scan, patient is referred for cardiac catheterization w/ possible intervention if clinically indicated.

## 2020-11-18 NOTE — H&P ADULT - ASSESSMENT
66 y/o male w/ PMHx HTN, HLD, type II DM, asthma (no prior intubations), anemia (takes iron at home, no prior transfusions), borderline glaucoma, cataracts (no surgery), GLADYS (on CPAP at home, reports compliance), and umbilical hernia (s/p surgical repair, now recurred) who presented to [facility] today for recommended cardiac cath with possible intervention in light of pt's risk factors, CCS 3 anginal symptoms and abnormal myocardial perfusion scan.    ASA III and Mallampati III    OF NOTE:    pt was loaded with  mg PO x1 and Plavix 600 mg PO x1 prior to procedure.    Risks & benefits of procedure and alternative therapy have been explained to the patient including but not limited to: allergic reaction, bleeding w/possible need for blood transfusion, infection, renal and vascular compromise, limb damage, arrhythmia, stroke, vessel dissection/perforation, Myocardial infarction, emergent CABG. Informed consent obtained and in chart.

## 2020-11-18 NOTE — H&P ADULT - NSICDXPASTMEDICALHX_GEN_ALL_CORE_FT
PAST MEDICAL HISTORY:  Anemia     Asthma     Cataracts, bilateral     DM type 2 (diabetes mellitus, type 2)     Glaucoma     HLD (hyperlipidemia)     HTN (hypertension)     Obstructive sleep apnea

## 2020-11-18 NOTE — H&P ADULT - NSICDXPASTSURGICALHX_GEN_ALL_CORE_FT
PAST SURGICAL HISTORY:  H/O umbilical hernia repair     History of appendectomy     History of cholecystectomy     History of colon resection

## 2020-11-19 ENCOUNTER — OUTPATIENT (OUTPATIENT)
Dept: OUTPATIENT SERVICES | Facility: HOSPITAL | Age: 65
LOS: 1 days | Discharge: NURSING, REHAB OR LONG-TERM CARE | End: 2020-11-19
Payer: COMMERCIAL

## 2020-11-19 DIAGNOSIS — Z90.49 ACQUIRED ABSENCE OF OTHER SPECIFIED PARTS OF DIGESTIVE TRACT: Chronic | ICD-10-CM

## 2020-11-19 DIAGNOSIS — Z98.890 OTHER SPECIFIED POSTPROCEDURAL STATES: Chronic | ICD-10-CM

## 2020-11-19 LAB
A1C WITH ESTIMATED AVERAGE GLUCOSE RESULT: 6.9 % — HIGH (ref 4–5.6)
ALBUMIN SERPL ELPH-MCNC: 4.3 G/DL — SIGNIFICANT CHANGE UP (ref 3.3–5)
ALP SERPL-CCNC: 96 U/L — SIGNIFICANT CHANGE UP (ref 40–120)
ALT FLD-CCNC: 29 U/L — SIGNIFICANT CHANGE UP (ref 10–45)
ANION GAP SERPL CALC-SCNC: 12 MMOL/L — SIGNIFICANT CHANGE UP (ref 5–17)
APTT BLD: 30.8 SEC — SIGNIFICANT CHANGE UP (ref 27.5–35.5)
AST SERPL-CCNC: 20 U/L — SIGNIFICANT CHANGE UP (ref 10–40)
BASOPHILS # BLD AUTO: 0.15 K/UL — SIGNIFICANT CHANGE UP (ref 0–0.2)
BASOPHILS NFR BLD AUTO: 1.8 % — SIGNIFICANT CHANGE UP (ref 0–2)
BILIRUB SERPL-MCNC: 0.4 MG/DL — SIGNIFICANT CHANGE UP (ref 0.2–1.2)
BUN SERPL-MCNC: 14 MG/DL — SIGNIFICANT CHANGE UP (ref 7–23)
CALCIUM SERPL-MCNC: 9.5 MG/DL — SIGNIFICANT CHANGE UP (ref 8.4–10.5)
CHLORIDE SERPL-SCNC: 103 MMOL/L — SIGNIFICANT CHANGE UP (ref 96–108)
CHOLEST SERPL-MCNC: 150 MG/DL — SIGNIFICANT CHANGE UP
CK MB CFR SERPL CALC: 3.6 NG/ML — SIGNIFICANT CHANGE UP (ref 0–6.7)
CK SERPL-CCNC: 145 U/L — SIGNIFICANT CHANGE UP (ref 30–200)
CO2 SERPL-SCNC: 26 MMOL/L — SIGNIFICANT CHANGE UP (ref 22–31)
CREAT SERPL-MCNC: 1.25 MG/DL — SIGNIFICANT CHANGE UP (ref 0.5–1.3)
EOSINOPHIL # BLD AUTO: 0.61 K/UL — HIGH (ref 0–0.5)
EOSINOPHIL NFR BLD AUTO: 7.2 % — HIGH (ref 0–6)
ESTIMATED AVERAGE GLUCOSE: 151 MG/DL — HIGH (ref 68–114)
GLUCOSE BLDC GLUCOMTR-MCNC: 141 MG/DL — HIGH (ref 70–99)
GLUCOSE SERPL-MCNC: 181 MG/DL — HIGH (ref 70–99)
HCT VFR BLD CALC: 43.7 % — SIGNIFICANT CHANGE UP (ref 39–50)
HDLC SERPL-MCNC: 51 MG/DL — SIGNIFICANT CHANGE UP
HGB BLD-MCNC: 14 G/DL — SIGNIFICANT CHANGE UP (ref 13–17)
IMM GRANULOCYTES NFR BLD AUTO: 0.8 % — SIGNIFICANT CHANGE UP (ref 0–1.5)
INR BLD: 1.04 — SIGNIFICANT CHANGE UP (ref 0.88–1.16)
LIPID PNL WITH DIRECT LDL SERPL: 54 MG/DL — SIGNIFICANT CHANGE UP
LYMPHOCYTES # BLD AUTO: 1.59 K/UL — SIGNIFICANT CHANGE UP (ref 1–3.3)
LYMPHOCYTES # BLD AUTO: 18.8 % — SIGNIFICANT CHANGE UP (ref 13–44)
MCHC RBC-ENTMCNC: 28.4 PG — SIGNIFICANT CHANGE UP (ref 27–34)
MCHC RBC-ENTMCNC: 32 GM/DL — SIGNIFICANT CHANGE UP (ref 32–36)
MCV RBC AUTO: 88.6 FL — SIGNIFICANT CHANGE UP (ref 80–100)
MONOCYTES # BLD AUTO: 0.98 K/UL — HIGH (ref 0–0.9)
MONOCYTES NFR BLD AUTO: 11.6 % — SIGNIFICANT CHANGE UP (ref 2–14)
NEUTROPHILS # BLD AUTO: 5.04 K/UL — SIGNIFICANT CHANGE UP (ref 1.8–7.4)
NEUTROPHILS NFR BLD AUTO: 59.8 % — SIGNIFICANT CHANGE UP (ref 43–77)
NON HDL CHOLESTEROL: 99 MG/DL — SIGNIFICANT CHANGE UP
NRBC # BLD: 0 /100 WBCS — SIGNIFICANT CHANGE UP (ref 0–0)
PLATELET # BLD AUTO: 281 K/UL — SIGNIFICANT CHANGE UP (ref 150–400)
POTASSIUM SERPL-MCNC: 4 MMOL/L — SIGNIFICANT CHANGE UP (ref 3.5–5.3)
POTASSIUM SERPL-SCNC: 4 MMOL/L — SIGNIFICANT CHANGE UP (ref 3.5–5.3)
PROT SERPL-MCNC: 7.2 G/DL — SIGNIFICANT CHANGE UP (ref 6–8.3)
PROTHROM AB SERPL-ACNC: 12.5 SEC — SIGNIFICANT CHANGE UP (ref 10.6–13.6)
RBC # BLD: 4.93 M/UL — SIGNIFICANT CHANGE UP (ref 4.2–5.8)
RBC # FLD: 13.6 % — SIGNIFICANT CHANGE UP (ref 10.3–14.5)
SODIUM SERPL-SCNC: 141 MMOL/L — SIGNIFICANT CHANGE UP (ref 135–145)
TRIGL SERPL-MCNC: 223 MG/DL — HIGH
WBC # BLD: 8.44 K/UL — SIGNIFICANT CHANGE UP (ref 3.8–10.5)
WBC # FLD AUTO: 8.44 K/UL — SIGNIFICANT CHANGE UP (ref 3.8–10.5)

## 2020-11-19 PROCEDURE — 93005 ELECTROCARDIOGRAM TRACING: CPT

## 2020-11-19 PROCEDURE — 93010 ELECTROCARDIOGRAM REPORT: CPT

## 2020-11-19 PROCEDURE — 83036 HEMOGLOBIN GLYCOSYLATED A1C: CPT

## 2020-11-19 PROCEDURE — 82962 GLUCOSE BLOOD TEST: CPT

## 2020-11-19 PROCEDURE — 93454 CORONARY ARTERY ANGIO S&I: CPT | Mod: 26

## 2020-11-19 PROCEDURE — C1887: CPT

## 2020-11-19 PROCEDURE — 85610 PROTHROMBIN TIME: CPT

## 2020-11-19 PROCEDURE — 80053 COMPREHEN METABOLIC PANEL: CPT

## 2020-11-19 PROCEDURE — C1769: CPT

## 2020-11-19 PROCEDURE — 82550 ASSAY OF CK (CPK): CPT

## 2020-11-19 PROCEDURE — 85730 THROMBOPLASTIN TIME PARTIAL: CPT

## 2020-11-19 PROCEDURE — 85025 COMPLETE CBC W/AUTO DIFF WBC: CPT

## 2020-11-19 PROCEDURE — 82553 CREATINE MB FRACTION: CPT

## 2020-11-19 PROCEDURE — C1894: CPT

## 2020-11-19 PROCEDURE — 93454 CORONARY ARTERY ANGIO S&I: CPT

## 2020-11-19 PROCEDURE — 80061 LIPID PANEL: CPT

## 2020-11-19 RX ORDER — FLUTICASONE PROPIONATE AND SALMETEROL 50; 250 UG/1; UG/1
1 POWDER ORAL; RESPIRATORY (INHALATION)
Qty: 0 | Refills: 0 | DISCHARGE

## 2020-11-19 RX ORDER — CLOPIDOGREL BISULFATE 75 MG/1
600 TABLET, FILM COATED ORAL ONCE
Refills: 0 | Status: COMPLETED | OUTPATIENT
Start: 2020-11-19 | End: 2020-11-19

## 2020-11-19 RX ORDER — SODIUM CHLORIDE 9 MG/ML
500 INJECTION INTRAMUSCULAR; INTRAVENOUS; SUBCUTANEOUS
Refills: 0 | Status: DISCONTINUED | OUTPATIENT
Start: 2020-11-19 | End: 2020-11-19

## 2020-11-19 RX ORDER — DEXTROSE 50 % IN WATER 50 %
25 SYRINGE (ML) INTRAVENOUS ONCE
Refills: 0 | Status: DISCONTINUED | OUTPATIENT
Start: 2020-11-19 | End: 2020-11-19

## 2020-11-19 RX ORDER — QUINAPRIL HYDROCHLORIDE 40 MG/1
1 TABLET, FILM COATED ORAL
Qty: 0 | Refills: 0 | DISCHARGE

## 2020-11-19 RX ORDER — SODIUM CHLORIDE 9 MG/ML
1000 INJECTION, SOLUTION INTRAVENOUS
Refills: 0 | Status: DISCONTINUED | OUTPATIENT
Start: 2020-11-19 | End: 2020-11-19

## 2020-11-19 RX ORDER — ASPIRIN/CALCIUM CARB/MAGNESIUM 324 MG
325 TABLET ORAL ONCE
Refills: 0 | Status: COMPLETED | OUTPATIENT
Start: 2020-11-19 | End: 2020-11-19

## 2020-11-19 RX ORDER — CHOLECALCIFEROL (VITAMIN D3) 125 MCG
1 CAPSULE ORAL
Qty: 0 | Refills: 0 | DISCHARGE

## 2020-11-19 RX ORDER — ALBUTEROL 90 UG/1
2 AEROSOL, METERED ORAL
Qty: 0 | Refills: 0 | DISCHARGE

## 2020-11-19 RX ORDER — INSULIN ASPART 100 [IU]/ML
12 INJECTION, SOLUTION SUBCUTANEOUS
Qty: 0 | Refills: 0 | DISCHARGE

## 2020-11-19 RX ORDER — INSULIN LISPRO 100/ML
VIAL (ML) SUBCUTANEOUS ONCE
Refills: 0 | Status: DISCONTINUED | OUTPATIENT
Start: 2020-11-19 | End: 2020-11-19

## 2020-11-19 RX ORDER — GLUCAGON INJECTION, SOLUTION 0.5 MG/.1ML
1 INJECTION, SOLUTION SUBCUTANEOUS ONCE
Refills: 0 | Status: DISCONTINUED | OUTPATIENT
Start: 2020-11-19 | End: 2020-11-19

## 2020-11-19 RX ORDER — DEXTROSE 50 % IN WATER 50 %
15 SYRINGE (ML) INTRAVENOUS ONCE
Refills: 0 | Status: DISCONTINUED | OUTPATIENT
Start: 2020-11-19 | End: 2020-11-19

## 2020-11-19 RX ORDER — DEXTROSE 50 % IN WATER 50 %
12.5 SYRINGE (ML) INTRAVENOUS ONCE
Refills: 0 | Status: DISCONTINUED | OUTPATIENT
Start: 2020-11-19 | End: 2020-11-19

## 2020-11-19 RX ADMIN — CLOPIDOGREL BISULFATE 600 MILLIGRAM(S): 75 TABLET, FILM COATED ORAL at 11:03

## 2020-11-19 RX ADMIN — Medication 325 MILLIGRAM(S): at 11:03

## 2020-11-19 RX ADMIN — SODIUM CHLORIDE 50 MILLILITER(S): 9 INJECTION INTRAMUSCULAR; INTRAVENOUS; SUBCUTANEOUS at 11:04

## 2020-11-19 NOTE — PROGRESS NOTE ADULT - SUBJECTIVE AND OBJECTIVE BOX
Interventional Cardiology PA SDA Discharge Note    Patient without complaints. Ambulated and voided without difficulties    Afebrile, VSS    Ext: Right Radial: no hematoma, no bleeding, dressing C/D/I    Pulses: intact RAD to baseline     A/P: 66 y/o male w/ PMHx HTN, HLD, type II DM, asthma (no prior intubations), anemia (takes iron at home, no prior transfusions), borderline glaucoma, cataracts (no surgery), GLADYS (on CPAP at home, reports compliance), and umbilical hernia (s/p surgical repair, now recurred) who presented to his outpatient cardiologist, Dr. Juan C Magdaleno, c/o BANKS w/ ambulation of 1-2 blocks. Patient reports he also will experience SOB/BANKS when he is getting dressed in the morning. Patient also reports intermittent central neck discomfort and occasional chest pain, but denies these being directly associated w/ his SOB/BANKS and reports these symptoms are relieved w/ rest. Patient denies dizziness, lightheadedness, chest pain, abdominal pain, nausea/vomiting, melena, LE edema, recent fever, chills, cough, recent travel, or known recent sick contacts. Myocardial Perfusion Scan 11/05/2020 revealed large area of prior infarction in the apical, inferior, and inferolateral wall w/ a small area of mild audrey-infarct ischemia in the inferior wall and EF 50% w/ inferior and inferolateral wall hypokinesis. In light of patient's risk factors, CCS Class III anginal symptoms, and abnormal myocardial perfusion scan, patient is referred for cardiac catheterization w/ possible intervention if clinically indicated. Patient is now s/p diagnostic cardiac catheterization w/ nonobstructive CAD. Patient was NOT given any sedation for the procedure due to having no escort home. Right radial access was utilized and radial band was eventually removed w/o complications.     1. Stable for discharge as per attending Dr. Bran after bed rest, patient voids, wrist stable and 30 minutes of ambulation.  2. Follow-up with PMD/Cardiologist Dr. Magdaleno in 1-2 weeks.  3. Discharged forms signed and copies in chart.

## 2020-11-24 DIAGNOSIS — I10 ESSENTIAL (PRIMARY) HYPERTENSION: ICD-10-CM

## 2020-11-24 DIAGNOSIS — E11.9 TYPE 2 DIABETES MELLITUS WITHOUT COMPLICATIONS: ICD-10-CM

## 2020-11-24 DIAGNOSIS — I25.118 ATHEROSCLEROTIC HEART DISEASE OF NATIVE CORONARY ARTERY WITH OTHER FORMS OF ANGINA PECTORIS: ICD-10-CM

## 2020-11-24 DIAGNOSIS — E66.01 MORBID (SEVERE) OBESITY DUE TO EXCESS CALORIES: ICD-10-CM

## 2020-11-24 DIAGNOSIS — Z79.84 LONG TERM (CURRENT) USE OF ORAL HYPOGLYCEMIC DRUGS: ICD-10-CM

## 2020-11-24 DIAGNOSIS — E78.5 HYPERLIPIDEMIA, UNSPECIFIED: ICD-10-CM

## 2020-11-25 PROBLEM — G47.33 OBSTRUCTIVE SLEEP APNEA (ADULT) (PEDIATRIC): Chronic | Status: ACTIVE | Noted: 2020-11-18

## 2020-11-25 PROBLEM — D64.9 ANEMIA, UNSPECIFIED: Chronic | Status: ACTIVE | Noted: 2020-11-18

## 2020-11-25 PROBLEM — H26.9 UNSPECIFIED CATARACT: Chronic | Status: ACTIVE | Noted: 2020-11-18

## 2020-11-25 PROBLEM — J45.909 UNSPECIFIED ASTHMA, UNCOMPLICATED: Chronic | Status: ACTIVE | Noted: 2020-11-18

## 2020-11-30 ENCOUNTER — APPOINTMENT (OUTPATIENT)
Dept: OPHTHALMOLOGY | Facility: CLINIC | Age: 65
End: 2020-11-30
Payer: MEDICARE

## 2020-11-30 ENCOUNTER — NON-APPOINTMENT (OUTPATIENT)
Age: 65
End: 2020-11-30

## 2020-11-30 PROCEDURE — 92133 CPTRZD OPH DX IMG PST SGM ON: CPT

## 2020-11-30 PROCEDURE — 92014 COMPRE OPH EXAM EST PT 1/>: CPT

## 2020-11-30 PROCEDURE — 99072 ADDL SUPL MATRL&STAF TM PHE: CPT

## 2020-11-30 PROCEDURE — 92083 EXTENDED VISUAL FIELD XM: CPT

## 2020-12-22 ENCOUNTER — RX RENEWAL (OUTPATIENT)
Age: 65
End: 2020-12-22

## 2020-12-22 ENCOUNTER — APPOINTMENT (OUTPATIENT)
Dept: HEART AND VASCULAR | Facility: CLINIC | Age: 65
End: 2020-12-22
Payer: MEDICARE

## 2020-12-22 VITALS
SYSTOLIC BLOOD PRESSURE: 130 MMHG | HEART RATE: 106 BPM | WEIGHT: 315 LBS | OXYGEN SATURATION: 95 % | BODY MASS INDEX: 47.74 KG/M2 | DIASTOLIC BLOOD PRESSURE: 70 MMHG | TEMPERATURE: 99 F | HEIGHT: 68 IN

## 2020-12-22 PROCEDURE — 99214 OFFICE O/P EST MOD 30 MIN: CPT

## 2020-12-22 PROCEDURE — 99072 ADDL SUPL MATRL&STAF TM PHE: CPT

## 2020-12-22 PROCEDURE — 93000 ELECTROCARDIOGRAM COMPLETE: CPT

## 2020-12-22 NOTE — ASSESSMENT
[FreeTextEntry1] : BANKS-  Must exclude CAD.  He also reports neck discomfort with exertion. Plan is to have pt perform a Pharm Nuc stress test at [facility] where the weight limit is higher than on our camera.  If Nuc is abnormal then I would favor a cardiac cath.  Continue Lipitor 40 mg.  Nuc is abnormal revealing a prior infarct with audrey-infarct ischemia of the apex, inferior and inferolateral walls.  Cath discussed in detail.  Pt agrees.  Cath clean\par \par Atypical CP   s/p Nuc, large Inf, IL and apical MI, NL EF, cath only luminal irregularities and echo was also NL...Nuc most likely a false (+) from attenuation.  Prilosec  x 30 days\par \par Hernia- I would clear him

## 2020-12-22 NOTE — REASON FOR VISIT
[FreeTextEntry1] : 66 y/o M with BANKS.  He has to stop when walking.  Even getting dressed causes BANKS.  He does note neck discomfort, better with rest.  Pt has never smoked.  CRFs include HTN, HLD, DM and he also has GLADYS.  \par \par 12/22/20  s/p Nuc, large Inf, IL and apical MI, NL EF, cath obly luminal irregularities and echo was also NL...Nuc most likely a false (+) from attenuation.\par \par EKG: NSR, PVC, left anterior hemiblock, possible ASWMI,  no ST-Tw abn. 10/16/20\par EKG: NSR, left anterior hemiblock, PRWP, no ST-Tw abn. 12/22/20

## 2021-01-04 ENCOUNTER — RX RENEWAL (OUTPATIENT)
Age: 66
End: 2021-01-04

## 2021-01-08 ENCOUNTER — RX RENEWAL (OUTPATIENT)
Age: 66
End: 2021-01-08

## 2021-02-03 ENCOUNTER — RX RENEWAL (OUTPATIENT)
Age: 66
End: 2021-02-03

## 2021-02-10 ENCOUNTER — RX RENEWAL (OUTPATIENT)
Age: 66
End: 2021-02-10

## 2021-02-24 ENCOUNTER — TRANSCRIPTION ENCOUNTER (OUTPATIENT)
Age: 66
End: 2021-02-24

## 2021-03-03 ENCOUNTER — RX RENEWAL (OUTPATIENT)
Age: 66
End: 2021-03-03

## 2021-03-09 ENCOUNTER — RESULT CHARGE (OUTPATIENT)
Age: 66
End: 2021-03-09

## 2021-03-09 ENCOUNTER — APPOINTMENT (OUTPATIENT)
Dept: INTERNAL MEDICINE | Facility: CLINIC | Age: 66
End: 2021-03-09
Payer: MEDICARE

## 2021-03-09 VITALS
TEMPERATURE: 98.6 F | HEART RATE: 105 BPM | SYSTOLIC BLOOD PRESSURE: 144 MMHG | DIASTOLIC BLOOD PRESSURE: 89 MMHG | HEIGHT: 68 IN | WEIGHT: 315 LBS | OXYGEN SATURATION: 95 % | BODY MASS INDEX: 47.74 KG/M2

## 2021-03-09 LAB — HBA1C MFR BLD HPLC: 7.1

## 2021-03-09 PROCEDURE — 99213 OFFICE O/P EST LOW 20 MIN: CPT | Mod: GC,25

## 2021-03-09 PROCEDURE — 99072 ADDL SUPL MATRL&STAF TM PHE: CPT

## 2021-03-09 PROCEDURE — 83036 HEMOGLOBIN GLYCOSYLATED A1C: CPT | Mod: QW

## 2021-03-12 ENCOUNTER — TRANSCRIPTION ENCOUNTER (OUTPATIENT)
Age: 66
End: 2021-03-12

## 2021-03-21 ENCOUNTER — RX RENEWAL (OUTPATIENT)
Age: 66
End: 2021-03-21

## 2021-04-01 ENCOUNTER — RX RENEWAL (OUTPATIENT)
Age: 66
End: 2021-04-01

## 2021-04-15 ENCOUNTER — TRANSCRIPTION ENCOUNTER (OUTPATIENT)
Age: 66
End: 2021-04-15

## 2021-04-26 ENCOUNTER — APPOINTMENT (OUTPATIENT)
Dept: INTERNAL MEDICINE | Facility: CLINIC | Age: 66
End: 2021-04-26
Payer: MEDICARE

## 2021-04-26 VITALS
HEIGHT: 68 IN | HEART RATE: 93 BPM | TEMPERATURE: 99.3 F | WEIGHT: 315 LBS | DIASTOLIC BLOOD PRESSURE: 83 MMHG | BODY MASS INDEX: 47.74 KG/M2 | OXYGEN SATURATION: 96 % | RESPIRATION RATE: 15 BRPM | SYSTOLIC BLOOD PRESSURE: 147 MMHG

## 2021-04-26 PROCEDURE — 99214 OFFICE O/P EST MOD 30 MIN: CPT | Mod: GC

## 2021-04-26 PROCEDURE — 99072 ADDL SUPL MATRL&STAF TM PHE: CPT

## 2021-04-28 ENCOUNTER — RX RENEWAL (OUTPATIENT)
Age: 66
End: 2021-04-28

## 2021-04-30 ENCOUNTER — APPOINTMENT (OUTPATIENT)
Dept: ENDOCRINOLOGY | Facility: CLINIC | Age: 66
End: 2021-04-30
Payer: MEDICARE

## 2021-04-30 VITALS
HEART RATE: 97 BPM | DIASTOLIC BLOOD PRESSURE: 84 MMHG | WEIGHT: 315 LBS | BODY MASS INDEX: 47.74 KG/M2 | SYSTOLIC BLOOD PRESSURE: 146 MMHG | TEMPERATURE: 98.7 F | HEIGHT: 68 IN | OXYGEN SATURATION: 96 %

## 2021-04-30 LAB — HBA1C MFR BLD HPLC: 6.9

## 2021-04-30 PROCEDURE — 83036 HEMOGLOBIN GLYCOSYLATED A1C: CPT | Mod: QW

## 2021-04-30 PROCEDURE — 99205 OFFICE O/P NEW HI 60 MIN: CPT | Mod: 25

## 2021-04-30 PROCEDURE — 99072 ADDL SUPL MATRL&STAF TM PHE: CPT

## 2021-04-30 NOTE — HISTORY OF PRESENT ILLNESS
[FreeTextEntry1] : Patient is a 64 yo man with type 2 diabetes, BMI 56, HTN, GLADYS here to establish endocrine care.\par \par Type 2 diabetes diagnosed about 2010 based on symptoms of urinary frequency.  Blood work confirmed diabetes. Had seen an endocrinologist in the past.  He did not like the first endocrinologist and insurance changed.  Patient was started on insulin right away.  \par Currently on levemir 36 units at bedtime, novolog 13 units TID before meals, metformin 1000 mg BID. States adherence to insulin but can forget the metformin.\par Patient checks sugars in the morning only: 120\par Had two episodes of hypoglycemia over the past 10 years, does not occur frequently. The last episode was about 3 years ago- value at that time was 68.\par Breakfast: most simple meal of the day; greek yogurt with mashed fruit; eats lots of carbohydrates\par Lunch: frozen burritos; occasionally cooks but not too much\par Dinner: pasta, likes carbohydrates, chicken with onion\par Take out food from the deli\par Snack: sugar free cookies \par Does not drink soda; has seltzer\par Dilated: December 2020\par Since quarantine, patient has gained weight. States its a combination of being "locked up in apartment," being in a depressive funk, feeling like a prisoner due to quarantine with cabin.  Believes inactivity was a big problem-gets out of breath very quickly.  He has seen cardiologist; echocardiogram was normal but was found to have heart damage on chemical stress testing.  Possible history of "heart attack" without even knowing. Angiogram was okay.  Utilizes CPAP for GLADYS\par \par Hx of pancreatitis last year.  States it ws due to alcohol. November-December patient was going through a wine tasting phase and would drink wine during that time. Prior to November-December, denies history of alcohol dependence or alcohol intake frequency. He has stopped drinking alcohol\par \par Patient states that doctors over at [address] recommended medications like Ozempic and/or Trulicity. He has a friend who is an endocrinologist who told him that Ozempic is a very good medicine.  Patient has a hx of pancreatitis- attack last year. He was concerned about the side effects including the retinopathy and pancreatitis. He received approval from GI doctor to start GLP1 agonist.  Patient has seen 3 bariatric surgeons and was told he was not a candidate

## 2021-04-30 NOTE — REVIEW OF SYSTEMS
[Shortness Of Breath] : shortness of breath [Orthopnea] : orthopnea [SOB on Exertion] : shortness of breath on exertion [Abdominal Pain] : abdominal pain [Polyuria] : polyuria [Dry Skin] : dry skin [Depression] : depression [Anxiety] : anxiety [Fatigue] : no fatigue [Decreased Appetite] : appetite not decreased [Visual Field Defect] : no visual field defect [Dysphagia] : no dysphagia [Dysphonia] : no dysphonia [Nausea] : no nausea [Constipation] : no constipation [Vomiting] : no vomiting [Diarrhea] : no diarrhea [Headaches] : no headaches [Tremors] : no tremors [Cold Intolerance] : no cold intolerance [FreeTextEntry7] : Pain from hernia [FreeTextEntry8] : Frequent urination-about every two to three hours [de-identified] : "that has never been good." Anti-depressants help in some ways but not in other ways

## 2021-04-30 NOTE — ASSESSMENT
[Diabetes Foot Care] : diabetes foot care [Long Term Vascular Complications] : long term vascular complications of diabetes [Carbohydrate Consistent Diet] : carbohydrate consistent diet [Importance of Diet and Exercise] : importance of diet and exercise to improve glycemic control, achieve weight loss and improve cardiovascular health [Exercise/Effect on Glucose] : exercise/effect on glucose [Hypoglycemia Management] : hypoglycemia management [Self Monitoring of Blood Glucose] : self monitoring of blood glucose [Injection Technique, Storage, Sharps Disposal] : injection technique, storage, and sharps disposal [Retinopathy Screening] : Patient was referred to ophthalmology for retinopathy screening [Weight Loss] : weight loss [FreeTextEntry1] : Patient is a 66 yo man with BMI Class III obesity, type 2 diabetes, HTN and HLD establishing endocrine care today\par \par 1.Class III obese\par -patient has struggled with weight gain for years and gained 50 lbs during quarantine\par -he has some depression and during quarantine was locked in his apartment with decreased physical activity\par -food recall indicates eating out, purchased foods.  We discussed importance of eating healthier which includes less take out and, if possible, healthy home cooked meals\par -he has seen 3 metabolic surgeons to consider bariatric surgery and was told by all providers he is not a candidate. One provider suggested a laparoscopic approach and others advised 100 lb weight loss before consideration\par -at this time, we can try Ozempic to help with weight and diabetes but realistic expectations provided.  Starting Ozempic may help with some weight loss but will not get him from Class III obesity to class II obesity \par -also discussed that weight loss requires behavioral modifications with healthy diet and increased physical activity\par -check TSH\par \par 2. Type 2 diabetes\par -patient's A1c is relatively well controlled on basal/bolus insulin and metformin\par -can add ozempic as descirbed above\par -we discussed side effects of ozempic including nausea/vomiting, stomach pain and pancreatitis. He denies personal/family hx of pancreatic cancer/medullary thyroid cancer/MEN syndrome. He did experience bout of alcohol induced pancreatitis last year but GI denied any absolute contraindications to starting GLP1 agonist\par -risks of retinopathy was also discussed; recommend dilated eye exam in July\par -nutritional counseling and therapist suggested-patient declines at this time\par -refer to podiatry\par -monofilament testing done\par -check microalbumin/creatinine today\par \par 3. HTN\par -BP goal < 140/90\par \par 4. HLD\par -statin\par \par Follow up in 3 months, sooner if needed

## 2021-04-30 NOTE — REASON FOR VISIT
[Initial Evaluation] : an initial evaluation [DM Type 2] : DM Type 2 [Weight Management/Obesity] : weight management/obesity [FreeTextEntry2] : Dr. René Leger

## 2021-04-30 NOTE — CONSULT LETTER
[Dear  ___] : Dear  [unfilled], [Consult Letter:] : I had the pleasure of evaluating your patient, [unfilled]. [Please see my note below.] : Please see my note below. [Sincerely,] : Sincerely, [FreeTextEntry3] : Pooja Brna MD

## 2021-04-30 NOTE — PHYSICAL EXAM
[Alert] : alert [Well Nourished] : well nourished [No Acute Distress] : no acute distress [EOMI] : extra ocular movement intact [Normal Hearing] : hearing was normal [Normal S1, S2] : normal S1 and S2 [Normal Rate] : heart rate was normal [Normal Bowel Sounds] : normal bowel sounds [Not Tender] : non-tender [Soft] : abdomen soft [No Respiratory Distress] : no respiratory distress [No Accessory Muscle Use] : no accessory muscle use [Clear to Auscultation] : lungs were clear to auscultation bilaterally [Normal Gait] : normal gait [Right Foot Was Examined] : right foot ~C was examined [Left Foot Was Examined] : left foot ~C was examined [Normal] : normal [Erythema] : erythematous [2+] : 2+ in the dorsalis pedis [No Motor Deficits] : the motor exam was normal [Normal Affect] : the affect was normal [Normal Insight/Judgement] : insight and judgment were intact [Normal Mood] : the mood was normal [Foot Ulcers] : no foot ulcers [Swelling] : not swollen [Tenderness] : not tender [#1 Diminished] : number 1 was normal [#2 Diminished] : number 2 was normal [#3 Diminished] : number 3 was normal [#4 Diminished] : number 4 was normal [#5 Diminished] : number 5 was normal [#6 Diminished] : number 6 was normal [#7 Diminished] : number 7 was normal [#8 Diminished] : number 8 was normal [#9 Diminished] : number 9 was normal [#10 Diminished] : number 10 was normal [de-identified] : pannus and hernia

## 2021-05-03 LAB
ALBUMIN SERPL ELPH-MCNC: 4.2 G/DL
ALP BLD-CCNC: 97 U/L
ALT SERPL-CCNC: 22 U/L
ANION GAP SERPL CALC-SCNC: 15 MMOL/L
AST SERPL-CCNC: 15 U/L
BILIRUB SERPL-MCNC: 0.3 MG/DL
BUN SERPL-MCNC: 19 MG/DL
CALCIUM SERPL-MCNC: 8.8 MG/DL
CHLORIDE SERPL-SCNC: 107 MMOL/L
CHOLEST SERPL-MCNC: 150 MG/DL
CO2 SERPL-SCNC: 19 MMOL/L
CREAT SERPL-MCNC: 1.27 MG/DL
CREAT SPEC-SCNC: 187 MG/DL
ESTIMATED AVERAGE GLUCOSE: 157 MG/DL
GLUCOSE SERPL-MCNC: 131 MG/DL
HBA1C MFR BLD HPLC: 7.1 %
HDLC SERPL-MCNC: 46 MG/DL
LDLC SERPL CALC-MCNC: 35 MG/DL
MICROALBUMIN 24H UR DL<=1MG/L-MCNC: 3.5 MG/DL
MICROALBUMIN/CREAT 24H UR-RTO: 19 MG/G
NONHDLC SERPL-MCNC: 104 MG/DL
POTASSIUM SERPL-SCNC: 4.3 MMOL/L
PROT SERPL-MCNC: 6.7 G/DL
SODIUM SERPL-SCNC: 142 MMOL/L
T3 SERPL-MCNC: 88 NG/DL
T4 FREE SERPL-MCNC: 0.7 NG/DL
TRIGL SERPL-MCNC: 346 MG/DL
TSH SERPL-ACNC: 1.75 UIU/ML

## 2021-05-05 ENCOUNTER — RX RENEWAL (OUTPATIENT)
Age: 66
End: 2021-05-05

## 2021-05-10 ENCOUNTER — RX RENEWAL (OUTPATIENT)
Age: 66
End: 2021-05-10

## 2021-05-17 ENCOUNTER — TRANSCRIPTION ENCOUNTER (OUTPATIENT)
Age: 66
End: 2021-05-17

## 2021-06-01 ENCOUNTER — APPOINTMENT (OUTPATIENT)
Dept: HEART AND VASCULAR | Facility: CLINIC | Age: 66
End: 2021-06-01
Payer: MEDICARE

## 2021-06-01 VITALS
HEIGHT: 68 IN | SYSTOLIC BLOOD PRESSURE: 130 MMHG | DIASTOLIC BLOOD PRESSURE: 70 MMHG | WEIGHT: 315 LBS | BODY MASS INDEX: 47.74 KG/M2 | OXYGEN SATURATION: 96 % | TEMPERATURE: 98.8 F

## 2021-06-01 VITALS — HEART RATE: 98 BPM

## 2021-06-01 DIAGNOSIS — R07.89 OTHER CHEST PAIN: ICD-10-CM

## 2021-06-01 PROCEDURE — 99213 OFFICE O/P EST LOW 20 MIN: CPT

## 2021-06-01 NOTE — REASON FOR VISIT
[FreeTextEntry1] : 64 y/o M with BANKS.  He has to stop when walking.  Even getting dressed causes BANKS.  He does note neck discomfort, better with rest.  Pt has never smoked.  CRFs include HTN, HLD, DM and he also has GLADYS.  \par \par 12/22/20  s/p Nuc, large Inf, IL and apical MI, NL EF, cath only luminal irregularities and echo was also NL...Nuc most likely a false (+) from attenuation.\par 6/1/21  walking more, much less SOB.  Had the Covid Vaccine.   A1c 7.1, LDL 35, , On Ozempic, losing wt.\par \par EKG: NSR, PVC, left anterior hemiblock, possible ASWMI,  no ST-Tw abn. 10/16/20\par EKG: NSR, left anterior hemiblock, PRWP, no ST-Tw abn. 12/22/20

## 2021-06-01 NOTE — PHYSICAL EXAM
[Normal Appearance] : normal appearance [Well Groomed] : well groomed [No Deformities] : no deformities [General Appearance - In No Acute Distress] : no acute distress [Normal Conjunctiva] : the conjunctiva exhibited no abnormalities [Eyelids - No Xanthelasma] : the eyelids demonstrated no xanthelasmas [Respiration, Rhythm And Depth] : normal respiratory rhythm and effort [Exaggerated Use Of Accessory Muscles For Inspiration] : no accessory muscle use [Auscultation Breath Sounds / Voice Sounds] : lungs were clear to auscultation bilaterally [Heart Rate And Rhythm] : heart rate and rhythm were normal [Heart Sounds] : normal S1 and S2 [Abdomen Tenderness] : non-tender [Abdomen Soft] : soft [Abdomen Mass (___ Cm)] : no abdominal mass palpated [Nail Clubbing] : no clubbing of the fingernails [Cyanosis, Localized] : no localized cyanosis [Petechial Hemorrhages (___cm)] : no petechial hemorrhages [Skin Color & Pigmentation] : normal skin color and pigmentation [] : no rash [No Venous Stasis] : no venous stasis [No Skin Ulcers] : no skin ulcer [No Xanthoma] : no  xanthoma was observed [FreeTextEntry1] : skin tags

## 2021-06-01 NOTE — ASSESSMENT
[FreeTextEntry1] : HTN- BP good on Qunipril and HCTZ\par \par Hypertriglyceridemia- Parallels the glucose, no new meds\par \par BANKS-  Must exclude CAD.  He also reports neck discomfort with exertion. Plan is to have pt perform a Pharm Nuc stress test at [facility] where the weight limit is higher than on our camera.  If Nuc is abnormal then I would favor a cardiac cath.  Continue Lipitor 40 mg.  Nuc is abnormal revealing a prior infarct with audrey-infarct ischemia of the apex, inferior and inferolateral walls.  Cath discussed in detail.  Pt agrees.  Cath clean\par \par Atypical CP   s/p Nuc, large Inf, IL and apical MI, NL EF, cath only luminal irregularities and echo was also NL...Nuc most likely a false (+) from attenuation.  Prilosec  x 30 days\par \par Hernia- I would clear him.  Losing Weight on Ozempic

## 2021-06-01 NOTE — REVIEW OF SYSTEMS
[Dyspnea on exertion] : dyspnea during exertion [Muscle Cramps] : muscle cramps [Negative] : Heme/Lymph

## 2021-06-09 ENCOUNTER — RX RENEWAL (OUTPATIENT)
Age: 66
End: 2021-06-09

## 2021-06-09 ENCOUNTER — APPOINTMENT (OUTPATIENT)
Dept: INTERNAL MEDICINE | Facility: CLINIC | Age: 66
End: 2021-06-09
Payer: MEDICARE

## 2021-06-09 ENCOUNTER — NON-APPOINTMENT (OUTPATIENT)
Age: 66
End: 2021-06-09

## 2021-06-09 VITALS
TEMPERATURE: 98.5 F | OXYGEN SATURATION: 98 % | WEIGHT: 315 LBS | SYSTOLIC BLOOD PRESSURE: 140 MMHG | RESPIRATION RATE: 16 BRPM | HEART RATE: 94 BPM | BODY MASS INDEX: 47.74 KG/M2 | DIASTOLIC BLOOD PRESSURE: 75 MMHG | HEIGHT: 68 IN

## 2021-06-09 PROCEDURE — 99214 OFFICE O/P EST MOD 30 MIN: CPT | Mod: GC

## 2021-06-09 RX ORDER — FLUTICASONE PROPIONATE AND SALMETEROL 250; 50 UG/1; UG/1
250-50 POWDER RESPIRATORY (INHALATION)
Qty: 1 | Refills: 11 | Status: DISCONTINUED | COMMUNITY
Start: 2019-05-28 | End: 2021-06-09

## 2021-06-18 ENCOUNTER — RX RENEWAL (OUTPATIENT)
Age: 66
End: 2021-06-18

## 2021-06-21 ENCOUNTER — RX RENEWAL (OUTPATIENT)
Age: 66
End: 2021-06-21

## 2021-06-28 ENCOUNTER — RX RENEWAL (OUTPATIENT)
Age: 66
End: 2021-06-28

## 2021-07-19 ENCOUNTER — APPOINTMENT (OUTPATIENT)
Dept: ENDOCRINOLOGY | Facility: CLINIC | Age: 66
End: 2021-07-19
Payer: MEDICARE

## 2021-07-19 VITALS
HEIGHT: 68 IN | HEART RATE: 92 BPM | WEIGHT: 315 LBS | SYSTOLIC BLOOD PRESSURE: 148 MMHG | BODY MASS INDEX: 47.74 KG/M2 | TEMPERATURE: 96 F | DIASTOLIC BLOOD PRESSURE: 89 MMHG | OXYGEN SATURATION: 96 %

## 2021-07-19 LAB — HBA1C MFR BLD HPLC: 6.2

## 2021-07-19 PROCEDURE — 99214 OFFICE O/P EST MOD 30 MIN: CPT | Mod: 25

## 2021-07-19 PROCEDURE — 83036 HEMOGLOBIN GLYCOSYLATED A1C: CPT | Mod: QW

## 2021-07-19 NOTE — ASSESSMENT
[FreeTextEntry1] : Patient is a 67 yo man with type 2 diabetes, class III obesity here for follow up\par \par 1. Type 2 diabetes\par -patient's A1c is relatively well controlled with POCT A1c of 6.2%\par -continue ozempic. Discussed he requires follow up with ophthalmology to monitor for retinopathy\par -we discussed side effects of ozempic including nausea/vomiting, stomach pain and pancreatitis. He denies personal/family hx of pancreatic cancer/medullary thyroid cancer/MEN syndrome. He did experience bout of alcohol induced pancreatitis last year but GI denied any absolute contraindications to starting GLP1 agonist\par -nutritional counseling and therapist suggested-patient declined at the last visit\par -has followed up with podiatry Dr. Omaira Deluca\par -based on tightly controlled A1c, recommend decreasing humalog to 10 units TID with meals if SMBG levels are at goal of 100-130 fasting and  <180 2 hours postprandial.  \par -continue metformin, ozempic and lantus at current doses\par \par 2. Abnormal thyroid function tests\par -repeat thyroid function testes today\par -free T4, total T3 and TSH levels\par \par 3. HLD\par -statin\par \par Follow up in 4 months, sooner if needed\par \par

## 2021-07-19 NOTE — HISTORY OF PRESENT ILLNESS
[FreeTextEntry1] : Patient is a 67 yo man with type 2 diabetes, BMI 56, HTN, GLADYS here for diabetes follow up\par \par Type 2 diabetes diagnosed about 2010 based on symptoms of urinary frequency. Blood work confirmed diabetes. Had seen an endocrinologist in the past. He did not like the first endocrinologist and insurance changed. Patient was started on insulin right away. \par Currently on levemir 36 units at bedtime, novolog 13 units TID before meals, metformin 1000 mg BID. Started on Ozempic 0.25 mg at the last visit. No reported side effects\par Patient checks sugars in the morning only: low 100s; today it was 106\par Had two episodes of hypoglycemia over the past 10 years, does not occur frequently. \par Breakfast: most simple meal of the day; greek yogurt with mashed fruit; eats lots of carbohydrates\par Lunch: frozen burritos; occasionally cooks but not too much\par Dinner: pasta, likes carbohydrates, chicken with onion\par States less eating\par Take out food from the deli\par Snack: sugar free cookies \par Does not drink soda; has seltzer\par Dilated: December 2020; Dr. Uzair Hood\par Utilizes CPAP for GLADYS\par Hx of pancreatitis last year. States it was due to alcohol. November-December patient was going through a wine tasting phase and would drink wine during that time. Prior to November-December, denies history of alcohol dependence or alcohol intake frequency. He has stopped drinking alcohol\par Patient has seen 3 bariatric surgeons and was told he was not a candidate \par Podiatry: Established care with Dr Matt Howe.\par

## 2021-07-19 NOTE — REVIEW OF SYSTEMS
[Fatigue] : no fatigue [Decreased Appetite] : appetite not decreased [Visual Field Defect] : no visual field defect [Dysphagia] : no dysphagia [Dysphonia] : no dysphonia [Shortness Of Breath] : no shortness of breath [Nausea] : no nausea [Constipation] : no constipation [Vomiting] : no vomiting [Diarrhea] : no diarrhea

## 2021-07-20 LAB
ALBUMIN SERPL ELPH-MCNC: 4.3 G/DL
ALP BLD-CCNC: 95 U/L
ALT SERPL-CCNC: 24 U/L
ANION GAP SERPL CALC-SCNC: 13 MMOL/L
AST SERPL-CCNC: 17 U/L
BILIRUB SERPL-MCNC: 0.3 MG/DL
BUN SERPL-MCNC: 19 MG/DL
CALCIUM SERPL-MCNC: 9.7 MG/DL
CHLORIDE SERPL-SCNC: 105 MMOL/L
CO2 SERPL-SCNC: 24 MMOL/L
CREAT SERPL-MCNC: 1.19 MG/DL
ESTIMATED AVERAGE GLUCOSE: 131 MG/DL
GLUCOSE SERPL-MCNC: 106 MG/DL
HBA1C MFR BLD HPLC: 6.2 %
POTASSIUM SERPL-SCNC: 4.7 MMOL/L
PROT SERPL-MCNC: 6.7 G/DL
SODIUM SERPL-SCNC: 141 MMOL/L
T3 SERPL-MCNC: 95 NG/DL
T4 FREE SERPL-MCNC: 0.8 NG/DL
THYROGLOB AB SERPL-ACNC: 480 IU/ML
THYROPEROXIDASE AB SERPL IA-ACNC: <10 IU/ML
TSH SERPL-ACNC: 1.79 UIU/ML

## 2021-08-01 ENCOUNTER — RX RENEWAL (OUTPATIENT)
Age: 66
End: 2021-08-01

## 2021-08-04 ENCOUNTER — RX RENEWAL (OUTPATIENT)
Age: 66
End: 2021-08-04

## 2021-08-10 ENCOUNTER — TRANSCRIPTION ENCOUNTER (OUTPATIENT)
Age: 66
End: 2021-08-10

## 2021-09-03 ENCOUNTER — APPOINTMENT (OUTPATIENT)
Dept: OPHTHALMOLOGY | Facility: CLINIC | Age: 66
End: 2021-09-03
Payer: MEDICARE

## 2021-09-03 ENCOUNTER — NON-APPOINTMENT (OUTPATIENT)
Age: 66
End: 2021-09-03

## 2021-09-03 PROCEDURE — 92133 CPTRZD OPH DX IMG PST SGM ON: CPT

## 2021-09-03 PROCEDURE — 92014 COMPRE OPH EXAM EST PT 1/>: CPT

## 2021-09-06 ENCOUNTER — RX RENEWAL (OUTPATIENT)
Age: 66
End: 2021-09-06

## 2021-09-13 ENCOUNTER — RX RENEWAL (OUTPATIENT)
Age: 66
End: 2021-09-13

## 2021-09-14 ENCOUNTER — APPOINTMENT (OUTPATIENT)
Dept: OPHTHALMOLOGY | Facility: CLINIC | Age: 66
End: 2021-09-14

## 2021-09-15 ENCOUNTER — RX RENEWAL (OUTPATIENT)
Age: 66
End: 2021-09-15

## 2021-09-21 ENCOUNTER — RX RENEWAL (OUTPATIENT)
Age: 66
End: 2021-09-21

## 2021-09-22 ENCOUNTER — APPOINTMENT (OUTPATIENT)
Dept: INTERNAL MEDICINE | Facility: CLINIC | Age: 66
End: 2021-09-22
Payer: MEDICARE

## 2021-09-22 VITALS
SYSTOLIC BLOOD PRESSURE: 122 MMHG | HEART RATE: 75 BPM | TEMPERATURE: 98.3 F | DIASTOLIC BLOOD PRESSURE: 80 MMHG | OXYGEN SATURATION: 96 %

## 2021-09-22 DIAGNOSIS — J34.2 DEVIATED NASAL SEPTUM: ICD-10-CM

## 2021-09-22 PROCEDURE — 99214 OFFICE O/P EST MOD 30 MIN: CPT | Mod: GC

## 2021-09-22 RX ORDER — HYDROCHLOROTHIAZIDE 12.5 MG/1
12.5 CAPSULE ORAL
Qty: 90 | Refills: 0 | Status: DISCONTINUED | COMMUNITY
Start: 2021-04-26 | End: 2021-09-22

## 2021-09-22 NOTE — PHYSICAL EXAM
[No Acute Distress] : no acute distress [Normal Sclera/Conjunctiva] : normal sclera/conjunctiva [Normal Outer Ear/Nose] : the outer ears and nose were normal in appearance [No Respiratory Distress] : no respiratory distress  [Clear to Auscultation] : lungs were clear to auscultation bilaterally [Normal Rate] : normal rate  [Regular Rhythm] : with a regular rhythm [Normal S1, S2] : normal S1 and S2 [No Murmur] : no murmur heard [No Edema] : there was no peripheral edema [Soft] : abdomen soft [Non Tender] : non-tender [Non-distended] : non-distended [No Rash] : no rash

## 2021-09-24 NOTE — PLAN
[FreeTextEntry1] : #hypertension\par Patient stopped his HCTZ on 8/10 due to leaking in his underwear, bp on recent visits was in 140s/70s-80s. Today pts bp 122/80.\par Pt ok to continue not taking HCTZ\par #depression\par Patient says his depression is where it was at before, says his dose of buproprion was increased to 300 from 150 lately, not sure if its really helping him but he does not want to change the dose at this time. Was asked if he would like a psych referral - denied.\par #diabetes\par Patient says he started ozempic .25 since the last time he was here, says it has been really helping his sugars and how he is feeling. Patient got refill of levemir today, says he needed it refilled. \par

## 2021-09-24 NOTE — ASSESSMENT
[FreeTextEntry1] : Pt is a 65 yo M with PMH of ventral hernias, pancreatitis, obesity, LE swelling, asthma, htn, depression who presents after being asked to come in for bp check due to stopping his HCTZ.

## 2021-09-24 NOTE — HISTORY OF PRESENT ILLNESS
[FreeTextEntry1] : Pt was asked to come in for BP follow up  [de-identified] : Pt is a 67 yo M with PMH of ventral hernias, pancreatitis, obesity, LE swelling, asthma, htn, depression who presents after being asked to come in for bp check. Pt had reached out to the office on 8/10 that he was going to discontinue his HCTZ as it was causing him to 'leak'. Patient said he was wetting his underwear 2-3x a day, and this problem stopped once he stopped the blood pressure medications. He says he has no symptoms since then and feels completely fine. Patient has pain in his nasal area today which he says he has been suffering from for years. The pain comes and goes, he visited an ENT where he had a nasal polyp and inflammation in his R nare, patient says he would not like any treatment for that at this time as ENT did not offer him anything and he just waits for the pain to go away. \par Patient has recently started ozempic and says it is going great and his blood sugars are down. Patient says his mood is okay, not great and not terrible but he is getting by. He says he would like to keep his anti depressant medication doses where they are at at this time.

## 2021-09-24 NOTE — REVIEW OF SYSTEMS
[Lower Ext Edema] : lower extremity edema [Constipation] : constipation [Headache] : headache [Fever] : no fever [Chills] : no chills [Fatigue] : no fatigue [Night Sweats] : no night sweats [Recent Change In Weight] : ~T no recent weight change [Vision Problems] : no vision problems [Chest Pain] : no chest pain [Palpitations] : no palpitations [Orthopena] : no orthopnea [Wheezing] : no wheezing [Cough] : no cough [Dyspnea on Exertion] : not dyspnea on exertion [Abdominal Pain] : no abdominal pain [Nausea] : no nausea [Vomiting] : no vomiting [Dysuria] : no dysuria [Joint Pain] : no joint pain [de-identified] : Headache with nasal pain

## 2021-09-24 NOTE — END OF VISIT
[] : Resident [FreeTextEntry3] : BP - doing great after DC HCTZ. \par \par Depression- although he reports frequent sadness and loneliness, he is comfortable staying at his current dosing regimen and does not like to change the dosing (either higher or lower) [Time Spent: ___ minutes] : I have spent [unfilled] minutes of time on the encounter.

## 2021-09-28 ENCOUNTER — RX RENEWAL (OUTPATIENT)
Age: 66
End: 2021-09-28

## 2021-10-01 ENCOUNTER — APPOINTMENT (OUTPATIENT)
Dept: OPHTHALMOLOGY | Facility: CLINIC | Age: 66
End: 2021-10-01
Payer: MEDICARE

## 2021-10-01 ENCOUNTER — NON-APPOINTMENT (OUTPATIENT)
Age: 66
End: 2021-10-01

## 2021-10-01 PROCEDURE — 92014 COMPRE OPH EXAM EST PT 1/>: CPT

## 2021-10-01 PROCEDURE — 92012 INTRM OPH EXAM EST PATIENT: CPT

## 2021-10-01 PROCEDURE — 92136 OPHTHALMIC BIOMETRY: CPT

## 2021-10-01 PROCEDURE — 92250 FUNDUS PHOTOGRAPHY W/I&R: CPT

## 2021-10-12 ENCOUNTER — RX RENEWAL (OUTPATIENT)
Age: 66
End: 2021-10-12

## 2021-10-15 ENCOUNTER — APPOINTMENT (OUTPATIENT)
Dept: INTERNAL MEDICINE | Facility: CLINIC | Age: 66
End: 2021-10-15
Payer: MEDICARE

## 2021-10-15 PROCEDURE — G0008: CPT

## 2021-10-15 PROCEDURE — 90662 IIV NO PRSV INCREASED AG IM: CPT

## 2021-10-28 ENCOUNTER — RX RENEWAL (OUTPATIENT)
Age: 66
End: 2021-10-28

## 2021-11-15 ENCOUNTER — RX RENEWAL (OUTPATIENT)
Age: 66
End: 2021-11-15

## 2021-11-24 ENCOUNTER — TRANSCRIPTION ENCOUNTER (OUTPATIENT)
Age: 66
End: 2021-11-24

## 2021-12-03 ENCOUNTER — RX RENEWAL (OUTPATIENT)
Age: 66
End: 2021-12-03

## 2021-12-07 ENCOUNTER — NON-APPOINTMENT (OUTPATIENT)
Age: 66
End: 2021-12-07

## 2021-12-07 ENCOUNTER — APPOINTMENT (OUTPATIENT)
Dept: HEART AND VASCULAR | Facility: CLINIC | Age: 66
End: 2021-12-07
Payer: MEDICARE

## 2021-12-07 VITALS
BODY MASS INDEX: 47.74 KG/M2 | HEIGHT: 68 IN | SYSTOLIC BLOOD PRESSURE: 130 MMHG | WEIGHT: 315 LBS | OXYGEN SATURATION: 97 % | HEART RATE: 87 BPM | DIASTOLIC BLOOD PRESSURE: 86 MMHG | TEMPERATURE: 98.2 F

## 2021-12-07 DIAGNOSIS — I34.0 NONRHEUMATIC MITRAL (VALVE) INSUFFICIENCY: ICD-10-CM

## 2021-12-07 DIAGNOSIS — I35.8 OTHER NONRHEUMATIC AORTIC VALVE DISORDERS: ICD-10-CM

## 2021-12-07 PROCEDURE — 93000 ELECTROCARDIOGRAM COMPLETE: CPT

## 2021-12-07 PROCEDURE — 99213 OFFICE O/P EST LOW 20 MIN: CPT

## 2021-12-07 NOTE — REVIEW OF SYSTEMS
[Dyspnea on exertion] : dyspnea during exertion [Muscle Cramps] : muscle cramps [Negative] : Heme/Lymph [Weight Loss (___ Lbs)] : [unfilled] ~Ulb weight loss

## 2021-12-07 NOTE — REASON FOR VISIT
[FreeTextEntry1] : 65 y/o M with BANKS.  He has to stop when walking.  Even getting dressed causes BANKS.  He does note neck discomfort, better with rest.  Pt has never smoked.  CRFs include HTN, HLD, DM and he also has GLADYS.  \par \par 12/22/20  s/p Nuc, large Inf, IL and apical MI, NL EF, cath only luminal irregularities and echo was also NL...Nuc most likely a false (+) from attenuation.\par 6/1/21  walking more, much less SOB.  Had the Covid Vaccine.   A1c 7.1, LDL 35, , On Ozempic, losing wt.\par 12/7/21 walking much better, good wt loss.\par \par EKG: NSR, PVC, left anterior hemiblock, possible ASWMI,  no ST-Tw abn. 10/16/20\par EKG: NSR, left anterior hemiblock, PRWP, no ST-Tw abn. 12/22/20

## 2021-12-07 NOTE — HISTORY OF PRESENT ILLNESS
[FreeTextEntry1] : PCP-  Dr Payton\par Pulm- Dr Barr\par Endo- Dr Pooja Simmons and [address]par Pod- Dr URBINA

## 2021-12-07 NOTE — ASSESSMENT
[FreeTextEntry1] : HTN- BP good on Qunipril and HCTZ\par \par Hypertriglyceridemia- Parallels the glucose, no new meds\par \par BANKS-  Must exclude CAD.  He also reports neck discomfort with exertion. Plan is to have pt perform a Pharm Nuc stress test at [facility] where the weight limit is higher than on our camera.  If Nuc is abnormal then I would favor a cardiac cath.  Continue Lipitor 40 mg.  Nuc is abnormal revealing a prior infarct with audrey-infarct ischemia of the apex, inferior and inferolateral walls.  Cath discussed in detail.  Pt agrees.  Cath clean\par \par Atypical CP   s/p Nuc, large Inf, IL and apical MI, NL EF, cath only luminal irregularities and echo was also NL...Nuc most likely a false (+) from attenuation.  Prilosec  x 30 days\par \par Hernia- I would clear him.  Losing Weight on Ozempic

## 2021-12-21 ENCOUNTER — RX RENEWAL (OUTPATIENT)
Age: 66
End: 2021-12-21

## 2021-12-23 ENCOUNTER — APPOINTMENT (OUTPATIENT)
Dept: INTERNAL MEDICINE | Facility: CLINIC | Age: 66
End: 2021-12-23
Payer: MEDICARE

## 2021-12-23 VITALS
RESPIRATION RATE: 18 BRPM | OXYGEN SATURATION: 96 % | HEART RATE: 81 BPM | DIASTOLIC BLOOD PRESSURE: 84 MMHG | BODY MASS INDEX: 51.67 KG/M2 | SYSTOLIC BLOOD PRESSURE: 150 MMHG | WEIGHT: 315 LBS

## 2021-12-23 DIAGNOSIS — B35.1 TINEA UNGUIUM: ICD-10-CM

## 2021-12-23 PROCEDURE — 99397 PER PM REEVAL EST PAT 65+ YR: CPT | Mod: 25

## 2021-12-23 PROCEDURE — 36415 COLL VENOUS BLD VENIPUNCTURE: CPT

## 2021-12-23 NOTE — END OF VISIT
[FreeTextEntry3] : 65 yo male with hx DM, HTN, hernias, pancreatitis ETOH related, ventral hernia here for CPE\par \par 1) ventral hernia - woud like repair however poor candidate due to weight.  counseled diet - reduce meat and dairy which are major portions of his diet. encouraged vegetables and lean protein.  \par \par 2) HTN - chronic, above goal today however appropriate cuff size not available\par 3) DM2 - at goal in july, will recheck today\par 4) onychomycosis - check LFTs on terbinafine\par 4) HCM - \par had flu shot\par had covid vaccine and booster\par cscope 2019 but poor prep - counseled f/u GI

## 2021-12-23 NOTE — ASSESSMENT
[FreeTextEntry1] : 66M PMHx of Ventral hernias, Obesity, LE swelling, DM, Cataracts, Glaucoma, HTN, x1 Alcohol-induced pancreatitis, Asthma, Depression. PSurghx Cholecystectomy 10yrs ago, hemicolectomy & appendectomy from colonic abscess c/b intestinal strangulation who presents for annual physical exam. \par \par #Weight loss\par Currently 330+lbs. Required to be <250lbs to qualify for ventral henia repair, and subsequent bariatric surgery. Last saw bariatric surgeon in 2018\par -Referred to Bariatric surgery again\par \par #Chronic Onychomycosis\par Started by Podiatrist Dr. Deluca #[phone] on Terbinafide since November 2021 for 3 month course. Requests monthly LFTs\par - Patient will subsequently obtain labs at outpatient phlebotomy company\par - f/u CMP from today's visit\par \par #HTN\par s/p HCTZ on 8/10 due to incontinence. BP fluctuates, elevated\par - c/w Quinapril 40mg Qd\par - encourage weight loss strategies\par \par #DM\par A1c 6.2, BS range 100-120. adherent with meds.\par - c/w Metformin 1g BID, Ozempic 0.25 qW, Novolog 10U w meals, Levemir 33U before bed\par - f/u A1c\par \par #HCM\par - UTD on vaccines\par - Patient should contact GI if repeat Cscope required due to previous poor prep\par \par RTC 3 months

## 2021-12-23 NOTE — PHYSICAL EXAM
[No Acute Distress] : no acute distress [Well Nourished] : well nourished [Well Developed] : well developed [Well-Appearing] : well-appearing [Normal Sclera/Conjunctiva] : normal sclera/conjunctiva [PERRL] : pupils equal round and reactive to light [EOMI] : extraocular movements intact [Normal Outer Ear/Nose] : the outer ears and nose were normal in appearance [Normal Oropharynx] : the oropharynx was normal [No JVD] : no jugular venous distention [No Lymphadenopathy] : no lymphadenopathy [Supple] : supple [Thyroid Normal, No Nodules] : the thyroid was normal and there were no nodules present [No Respiratory Distress] : no respiratory distress  [No Accessory Muscle Use] : no accessory muscle use [Clear to Auscultation] : lungs were clear to auscultation bilaterally [Normal Rate] : normal rate  [Regular Rhythm] : with a regular rhythm [Normal S1, S2] : normal S1 and S2 [No Murmur] : no murmur heard [No Carotid Bruits] : no carotid bruits [No Abdominal Bruit] : a ~M bruit was not heard ~T in the abdomen [No Varicosities] : no varicosities [Pedal Pulses Present] : the pedal pulses are present [No Edema] : there was no peripheral edema [No Palpable Aorta] : no palpable aorta [No Extremity Clubbing/Cyanosis] : no extremity clubbing/cyanosis [Normal Bowel Sounds] : normal bowel sounds [Normal Posterior Cervical Nodes] : no posterior cervical lymphadenopathy [Normal Anterior Cervical Nodes] : no anterior cervical lymphadenopathy [No CVA Tenderness] : no CVA  tenderness [No Spinal Tenderness] : no spinal tenderness [No Joint Swelling] : no joint swelling [Grossly Normal Strength/Tone] : grossly normal strength/tone [No Rash] : no rash [Coordination Grossly Intact] : coordination grossly intact [No Focal Deficits] : no focal deficits [Normal Gait] : normal gait [Deep Tendon Reflexes (DTR)] : deep tendon reflexes were 2+ and symmetric [Normal Affect] : the affect was normal [Normal Insight/Judgement] : insight and judgment were intact [de-identified] : massive L ventral hernia [de-identified] : LLE +2 pitting edema

## 2021-12-23 NOTE — HISTORY OF PRESENT ILLNESS
[FreeTextEntry1] : Annual Physical [de-identified] : 66M PMHx of Ventral hernias, Obesity, LE swelling, DM, Cataracts, Glaucoma, HTN, x1 Alcohol-induced pancreatitis, Asthma, Depression. PSurghx Cholecystectomy 10yrs ago, hemicolectomy & appendectomy from colonic abscess c/b intestinal strangulation who presents for annual physical exam. Completed recent w/u for BANKS with Cardiology, Stress test abnormal, Echo wnl, Cardiac cath wnl. BANKS resolved on its own. Started Terbinafine for 1 month for chronic b/l Onchomycosis, prescribed 120 days by his podiatrist Dr. Deluca #[phone] requests LFTS checked monthly. Takes Novolog 10U w meals, Levemir 33U before bed, BS reading 100-120 range. Endorses decreased bowel movements, since last 2 years every 3-4 days. Needs 250lbs weight loss for hernia repair, and subsequent bariatric surgery but has been unable ot expedite weight loss further. Used to attend weight loss program at [facility]. Recognizes benefit exercise and nutrition interventions but lacks motivation. Sees Leopoldo Hood at [facility] considering cataract surgery. Denies HA, Vision changes, Fever, Chills, CP, SOB, BANKS, Abd pain, Back pain, changes in diet or bathroom habits. \par \par Received Pfizer booster received 3 weeks ago. Retired, income form investments and savings. Lives alone. Diet daily, meat, less vegetables. Sedentary lifestyle. Father had glaucoma, cataracts, macular de asthma, prostate Ca, AS,  from [place]. Mother  of MI. Brothers healthy. Allergic to penicillin and clindamycin producing rash, tachypnea, SOB

## 2021-12-27 LAB
ALBUMIN SERPL ELPH-MCNC: 4.3 G/DL
ALP BLD-CCNC: 101 U/L
ALT SERPL-CCNC: 25 U/L
ANION GAP SERPL CALC-SCNC: 13 MMOL/L
AST SERPL-CCNC: 21 U/L
BILIRUB SERPL-MCNC: 0.3 MG/DL
BUN SERPL-MCNC: 18 MG/DL
CALCIUM SERPL-MCNC: 9.2 MG/DL
CHLORIDE SERPL-SCNC: 104 MMOL/L
CO2 SERPL-SCNC: 24 MMOL/L
CREAT SERPL-MCNC: 1.21 MG/DL
ESTIMATED AVERAGE GLUCOSE: 126 MG/DL
GLUCOSE SERPL-MCNC: 83 MG/DL
HBA1C MFR BLD HPLC: 6 %
POTASSIUM SERPL-SCNC: 4.5 MMOL/L
PROT SERPL-MCNC: 6.6 G/DL
SODIUM SERPL-SCNC: 142 MMOL/L

## 2022-01-03 ENCOUNTER — RX RENEWAL (OUTPATIENT)
Age: 67
End: 2022-01-03

## 2022-01-11 ENCOUNTER — TRANSCRIPTION ENCOUNTER (OUTPATIENT)
Age: 67
End: 2022-01-11

## 2022-01-18 ENCOUNTER — APPOINTMENT (OUTPATIENT)
Dept: ENDOCRINOLOGY | Facility: CLINIC | Age: 67
End: 2022-01-18
Payer: MEDICARE

## 2022-01-18 VITALS
SYSTOLIC BLOOD PRESSURE: 172 MMHG | HEIGHT: 68 IN | WEIGHT: 315 LBS | TEMPERATURE: 98 F | DIASTOLIC BLOOD PRESSURE: 93 MMHG | HEART RATE: 94 BPM | BODY MASS INDEX: 47.74 KG/M2 | OXYGEN SATURATION: 97 %

## 2022-01-18 DIAGNOSIS — R94.6 ABNORMAL RESULTS OF THYROID FUNCTION STUDIES: ICD-10-CM

## 2022-01-18 PROCEDURE — 99214 OFFICE O/P EST MOD 30 MIN: CPT

## 2022-01-18 NOTE — PHYSICAL EXAM
[Alert] : alert [Well Nourished] : well nourished [No Acute Distress] : no acute distress [EOMI] : extra ocular movement intact [Normal Hearing] : hearing was normal [No Respiratory Distress] : no respiratory distress [No Accessory Muscle Use] : no accessory muscle use [Clear to Auscultation] : lungs were clear to auscultation bilaterally [Normal S1, S2] : normal S1 and S2 [Normal Rate] : heart rate was normal [Normal Bowel Sounds] : normal bowel sounds [Not Tender] : non-tender [Soft] : abdomen soft [Normal Gait] : normal gait [Foot Ulcers] : no foot ulcers [Right foot was examined, including] : right foot ~C was examined, including visual inspection with sensory and pulse exams [Left foot was examined, including] : left foot ~C was examined, including visual inspection with sensory and pulse exams [Normal] : normal [Swelling] : not swollen [Tenderness] : not tender [Erythema] : not erythematous [2+] : 2+ in the dorsalis pedis [#1 Diminished] : number 1 was normal [#2 Diminished] : number 2 was normal [#3 Diminished] : number 3 was normal [#4 Diminished] : number 4 was normal [#5 Diminished] : number 5 was normal [#6 Diminished] : number 6 was normal [#7 Diminished] : number 7 was normal [#8 Diminished] : number 8 was normal [#9 Diminished] : number 9 was normal [#10 Diminished] : number 10 was normal [No Motor Deficits] : the motor exam was normal [Normal Affect] : the affect was normal [Normal Insight/Judgement] : insight and judgment were intact [Normal Mood] : the mood was normal [de-identified] : pannus and hernia [de-identified] : patient has small scabs and hyperpigmentation to LLQ where hernia is. No signs of cellulitis

## 2022-01-18 NOTE — REVIEW OF SYSTEMS
[Fatigue] : no fatigue [Dysphagia] : no dysphagia [Dysphonia] : no dysphonia [Nausea] : no nausea [Vomiting] : no vomiting [Headaches] : no headaches [Depression] : no depression [Polydipsia] : no polydipsia [de-identified] : scabs to injection sites

## 2022-01-18 NOTE — HISTORY OF PRESENT ILLNESS
[FreeTextEntry1] : Patient is a 65 yo man with type 2 diabetes, BMI 56, HTN, GLADYS here for diabetes follow up\par \par Type 2 diabetes diagnosed about 2010 based on symptoms of urinary frequency. Blood work confirmed diabetes. Had seen an endocrinologist in the past. He did not like the first endocrinologist and insurance changed. Patient was started on insulin right away. \par Currently on levemir 30 units at bedtime, novolog 10 units TID before meals, metformin 1000 mg BID and weekly ozempic. Levemir was out of stock for a week but was able to pick it up.\par Patient checks sugars in the morning only: today it was 120s \par Breakfast: most simple meal of the day; greek yogurt with mashed fruit; eats lots of carbohydrates; three eggs + kimchi and an orange\par Lunch: still has frozen foods\par Dinner: pasta, likes carbohydrates, chicken with onion; melon/honey dew and sugar free cookies\par Does not drink soda; has selalixer\par Dilated: October 2021; Dr. Uzair Hood\par Utilizes CPAP for GLADYS\par Hx of pancreatitis last year. States it was due to alcohol. November-December patient was going through a wine tasting phase and would drink wine during that time. Prior to November-December, denies history of alcohol dependence or alcohol intake frequency. He has stopped drinking alcohol\par Patient has seen 3 bariatric surgeons and was told he was not a candidate \par Podiatry: Established care with Dr Matt Howe. Is currently taking antifungals for a reported duration of 1 month. Has two more months left and follow up in May\par

## 2022-01-18 NOTE — ASSESSMENT
[FreeTextEntry1] : Patient is a 65 yo man with type 2 diabetes, class III obesity here for follow up\par \par 1. Type 2 diabetes\par -patient's A1c is well controlled with POCT A1c of 5.9% December 2021\par -continue ozempic. Discussed he requires follow up with ophthalmology to monitor for retinopathy\par -we discussed side effects of ozempic including nausea/vomiting, stomach pain and pancreatitis. He denies personal/family hx of pancreatic cancer/medullary thyroid cancer/MEN syndrome. He did experience bout of alcohol induced pancreatitis 2021 but GI denied any absolute contraindications to starting GLP1 agonist\par -has followed up with podiatry Dr. Omaira Deluca\par -diet remains liberal, high in calories; not consistent in carbs. He does avoid sugars\par -continue levemir 30 and humalog to 10 units TID with meals if SMBG levels are at goal of 100-130 fasting and <180 2 hours postprandial. \par -continue metformin and ozempic\par -check microalbumin/creatinine levels\par -dilated eye exam annually\par -podiatry follow up\par -consider derm evaluation for scabs\par -avoid injection insulin to hernia side.  Can inject to posterior arms or R abdomen\par \par 2. Abnormal thyroid function tests\par -repeat thyroid function testes today\par -free T4, total T3 and TSH levels\par \par 3. HLD\par -statin\par \par 4. HTN\par -BP goal < 140/90\par \par Follow up in 5-6 months, sooner if needed\par  [Diabetes Foot Care] : diabetes foot care [Long Term Vascular Complications] : long term vascular complications of diabetes [Carbohydrate Consistent Diet] : carbohydrate consistent diet [Importance of Diet and Exercise] : importance of diet and exercise to improve glycemic control, achieve weight loss and improve cardiovascular health [Action and use of Insulin] : action and use of short and long-acting insulin [Self Monitoring of Blood Glucose] : self monitoring of blood glucose [Insulin Self-Administration] : insulin self-administration [Injection Technique, Storage, Sharps Disposal] : injection technique, storage, and sharps disposal [Retinopathy Screening] : Patient was referred to ophthalmology for retinopathy screening

## 2022-01-19 LAB
CHOLEST SERPL-MCNC: 150 MG/DL
CREAT SPEC-SCNC: 181 MG/DL
HDLC SERPL-MCNC: 53 MG/DL
LDLC SERPL CALC-MCNC: 64 MG/DL
MICROALBUMIN 24H UR DL<=1MG/L-MCNC: 2.8 MG/DL
MICROALBUMIN/CREAT 24H UR-RTO: 16 MG/G
NONHDLC SERPL-MCNC: 96 MG/DL
T3 SERPL-MCNC: 91 NG/DL
T4 FREE SERPL-MCNC: 0.8 NG/DL
THYROGLOB AB SERPL-ACNC: 422 IU/ML
THYROPEROXIDASE AB SERPL IA-ACNC: 12.2 IU/ML
TRIGL SERPL-MCNC: 163 MG/DL
TSH SERPL-ACNC: 2.21 UIU/ML

## 2022-01-24 ENCOUNTER — RX RENEWAL (OUTPATIENT)
Age: 67
End: 2022-01-24

## 2022-01-28 LAB — T4 FREE SERPL DIALY-MCNC: 0.98 NG/DL

## 2022-03-03 ENCOUNTER — APPOINTMENT (OUTPATIENT)
Dept: BARIATRICS | Facility: CLINIC | Age: 67
End: 2022-03-03

## 2022-03-03 VITALS
DIASTOLIC BLOOD PRESSURE: 85 MMHG | HEART RATE: 99 BPM | SYSTOLIC BLOOD PRESSURE: 151 MMHG | TEMPERATURE: 97.3 F | HEIGHT: 67.5 IN | BODY MASS INDEX: 48.86 KG/M2 | OXYGEN SATURATION: 100 % | WEIGHT: 315 LBS

## 2022-03-04 ENCOUNTER — NON-APPOINTMENT (OUTPATIENT)
Age: 67
End: 2022-03-04

## 2022-03-04 ENCOUNTER — TRANSCRIPTION ENCOUNTER (OUTPATIENT)
Age: 67
End: 2022-03-04

## 2022-03-04 ENCOUNTER — APPOINTMENT (OUTPATIENT)
Dept: OPHTHALMOLOGY | Facility: CLINIC | Age: 67
End: 2022-03-04
Payer: MEDICARE

## 2022-03-04 PROCEDURE — 92133 CPTRZD OPH DX IMG PST SGM ON: CPT

## 2022-03-04 PROCEDURE — 92014 COMPRE OPH EXAM EST PT 1/>: CPT

## 2022-03-07 ENCOUNTER — RX RENEWAL (OUTPATIENT)
Age: 67
End: 2022-03-07

## 2022-03-20 ENCOUNTER — RX RENEWAL (OUTPATIENT)
Age: 67
End: 2022-03-20

## 2022-03-21 ENCOUNTER — APPOINTMENT (OUTPATIENT)
Dept: INTERNAL MEDICINE | Facility: CLINIC | Age: 67
End: 2022-03-21
Payer: MEDICARE

## 2022-03-21 VITALS
SYSTOLIC BLOOD PRESSURE: 144 MMHG | DIASTOLIC BLOOD PRESSURE: 87 MMHG | HEART RATE: 81 BPM | OXYGEN SATURATION: 95 % | TEMPERATURE: 99 F | HEIGHT: 67.5 IN

## 2022-03-21 DIAGNOSIS — R60.0 LOCALIZED EDEMA: ICD-10-CM

## 2022-03-21 PROCEDURE — 36415 COLL VENOUS BLD VENIPUNCTURE: CPT

## 2022-03-21 PROCEDURE — 99213 OFFICE O/P EST LOW 20 MIN: CPT | Mod: GC,25

## 2022-03-22 ENCOUNTER — NON-APPOINTMENT (OUTPATIENT)
Age: 67
End: 2022-03-22

## 2022-03-22 LAB
ALBUMIN SERPL ELPH-MCNC: 4.4 G/DL
ALP BLD-CCNC: 98 U/L
ALT SERPL-CCNC: 22 U/L
ANION GAP SERPL CALC-SCNC: 13 MMOL/L
AST SERPL-CCNC: 18 U/L
BASOPHILS # BLD AUTO: 0.11 K/UL
BASOPHILS NFR BLD AUTO: 1.4 %
BILIRUB SERPL-MCNC: 0.3 MG/DL
BUN SERPL-MCNC: 15 MG/DL
CALCIUM SERPL-MCNC: 9.3 MG/DL
CHLORIDE SERPL-SCNC: 103 MMOL/L
CO2 SERPL-SCNC: 26 MMOL/L
CREAT SERPL-MCNC: 1.16 MG/DL
EGFR: 69 ML/MIN/1.73M2
EOSINOPHIL # BLD AUTO: 0.53 K/UL
EOSINOPHIL NFR BLD AUTO: 6.6 %
ESTIMATED AVERAGE GLUCOSE: 128 MG/DL
FOLATE SERPL-MCNC: 15.2 NG/ML
GLUCOSE SERPL-MCNC: 93 MG/DL
HBA1C MFR BLD HPLC: 6.1 %
HCT VFR BLD CALC: 45.1 %
HGB BLD-MCNC: 13.9 G/DL
IMM GRANULOCYTES NFR BLD AUTO: 0.4 %
LYMPHOCYTES # BLD AUTO: 1.58 K/UL
LYMPHOCYTES NFR BLD AUTO: 19.7 %
MAGNESIUM SERPL-MCNC: 2.1 MG/DL
MAN DIFF?: NORMAL
MCHC RBC-ENTMCNC: 28.4 PG
MCHC RBC-ENTMCNC: 30.8 GM/DL
MCV RBC AUTO: 92.2 FL
MONOCYTES # BLD AUTO: 0.82 K/UL
MONOCYTES NFR BLD AUTO: 10.2 %
NEUTROPHILS # BLD AUTO: 4.94 K/UL
NEUTROPHILS NFR BLD AUTO: 61.7 %
PLATELET # BLD AUTO: 298 K/UL
POTASSIUM SERPL-SCNC: 4.6 MMOL/L
PROT SERPL-MCNC: 6.7 G/DL
RBC # BLD: 4.89 M/UL
RBC # FLD: 14.6 %
SODIUM SERPL-SCNC: 143 MMOL/L
VIT B12 SERPL-MCNC: 385 PG/ML
WBC # FLD AUTO: 8.01 K/UL

## 2022-04-08 ENCOUNTER — RX RENEWAL (OUTPATIENT)
Age: 67
End: 2022-04-08

## 2022-05-02 ENCOUNTER — TRANSCRIPTION ENCOUNTER (OUTPATIENT)
Age: 67
End: 2022-05-02

## 2022-05-02 ENCOUNTER — RX RENEWAL (OUTPATIENT)
Age: 67
End: 2022-05-02

## 2022-05-05 ENCOUNTER — TRANSCRIPTION ENCOUNTER (OUTPATIENT)
Age: 67
End: 2022-05-05

## 2022-06-01 ENCOUNTER — APPOINTMENT (OUTPATIENT)
Dept: GASTROENTEROLOGY | Facility: CLINIC | Age: 67
End: 2022-06-01
Payer: MEDICARE

## 2022-06-01 VITALS
RESPIRATION RATE: 15 BRPM | TEMPERATURE: 96 F | OXYGEN SATURATION: 98 % | DIASTOLIC BLOOD PRESSURE: 86 MMHG | WEIGHT: 315 LBS | BODY MASS INDEX: 48.86 KG/M2 | SYSTOLIC BLOOD PRESSURE: 135 MMHG | HEART RATE: 102 BPM | HEIGHT: 67.5 IN

## 2022-06-01 PROCEDURE — 99214 OFFICE O/P EST MOD 30 MIN: CPT

## 2022-06-02 NOTE — PHYSICAL EXAM
[General Appearance - Alert] : alert [General Appearance - In No Acute Distress] : in no acute distress [Abdomen Soft] : soft [No CVA Tenderness] : no ~M costovertebral angle tenderness [Oriented To Time, Place, And Person] : oriented to person, place, and time [FreeTextEntry1] : Obese, pendulous, prominent hernia

## 2022-06-02 NOTE — ASSESSMENT
[FreeTextEntry1] : 66 y/o Male with reported UC history, (previously on Balzalazide, stopped in 02/2020 after his PMD told that its potential culprit for pancreatitis), IDDM, HLD, sigmoidectomy (?why), cholecystectomy, appendectomy, Large abdominal wall hernias, Last seen 03/2020, comes for in person visit after he recently contacted office requesting visit to discuss endoscopic gastric sleeve procedure. \par \par #Morbid obesity - \par Patient interested in endoscopic gastric sleeve procedure.  We did discuss pros and cons of the procedure and he had some technical questions which I deferred to the gastroenterologist who would be performing it.\par \par Will refer him to Social work ([place]) for some additional counselling as he seems overwhelmed by his currently medical predicament (hernia and weight loss surgeries) \par \par #IBD ?\par We were never able to confirm his UC diagnosis, his most recent colonoscopy was in 2019 with negative path and no active colitis. He has  remained off Balsalazide for about 4 years now and has not reported any flare or GI symptoms. \par No intervention planned at this time and would continue to monitor symptoms. \par Can consider fecal calprotectin to be used as monitoring for inflammation noninvasively \par \par #Constipation \par Recommended to use MiraLax regularly to avoid intermittent constipation \par #Abdominal wall hernia - reports that he will have to loose weight prior to any repair planned\par \par # Previous history of Pancreatitis - CT was normal, igg4 negative, no current complains of epigastric pain \par \par RTC PRN \par \par Ana Sandoval MD\par Advanced IBD fellow

## 2022-06-02 NOTE — HISTORY OF PRESENT ILLNESS
[Heartburn] : denies heartburn [Nausea] : denies nausea [Vomiting] : denies vomiting [Diarrhea] : denies diarrhea [Constipation] : stable constipation [Yellow Skin Or Eyes (Jaundice)] : denies jaundice [Abdominal Pain] : stable abdominal pain [Wt Loss ___ Lbs] : recent [unfilled] ~Upound(s) weight loss [FreeTextEntry1] : 68 y/o Male with reported UC history, (previously on Balzalazide, stopped in 02/2020 after his PMD told that its potential culprit for pancreatitis), IDDM, HLD, sigmoidectomy (?why), cholecystectomy, appendectomy, Large abdominal wall hernias, Last seen 03/2020, comes for in person visit after he recently contacted office requesting visit to discuss endoscopic gastric sleeve procedure. \par \par He went for Surgical evaluation for abdominal hernia repair and was informed that he would need to loose weight for procedure to be successful. Was then seen by Bariatric surgery team who recommended hernia repair prior to  bariatric procedure. Later he found out about the endoscopic gastric sleeve procedure, and was seen by Dr. Ysabel Ballard at the [facility].  As per the patient he was deemed a candidate for the procedure. Subsequently contacted our office to discuss more. \par \par Reports that he is interested in the procedure but has a lot of questions. \par Most questions are about the technique of the procedure and physiologic changes secondary to altered anatomy. \par \par Other complains - intermittent constipation for few days which gets resolved after taking MiraLAX and stool softener \par \par CT scan Feb 2020: large abdominal hernias and kidney cysts, no pancreatitis \par \par IBD history - \par Pt had been under care of Dr. Gage but wanted to get second opinion and establish care. \par Reports was diagnosed in 2005\par Currently not on any medications\par Last Cscope May 2019: no active colitis; pseudopolyps in TI \par Cscope October 2017 revealed no e/o active colitis, however exudate and erythema at IC valve and TI with questionable polyps. diffuse diverticulosis. PATH - TI with focal acute inflammation, IC valve acute inflammation. \par Path: focal acute inflammation, otherwise normal path. \par \par \par

## 2022-06-08 ENCOUNTER — TRANSCRIPTION ENCOUNTER (OUTPATIENT)
Age: 67
End: 2022-06-08

## 2022-06-10 ENCOUNTER — TRANSCRIPTION ENCOUNTER (OUTPATIENT)
Age: 67
End: 2022-06-10

## 2022-06-14 ENCOUNTER — APPOINTMENT (OUTPATIENT)
Dept: ENDOCRINOLOGY | Facility: CLINIC | Age: 67
End: 2022-06-14
Payer: MEDICARE

## 2022-06-14 VITALS
TEMPERATURE: 97.2 F | WEIGHT: 315 LBS | SYSTOLIC BLOOD PRESSURE: 144 MMHG | BODY MASS INDEX: 48.86 KG/M2 | HEART RATE: 102 BPM | OXYGEN SATURATION: 96 % | HEIGHT: 67.5 IN | DIASTOLIC BLOOD PRESSURE: 87 MMHG

## 2022-06-14 LAB — HBA1C MFR BLD HPLC: 6.2

## 2022-06-14 PROCEDURE — 83036 HEMOGLOBIN GLYCOSYLATED A1C: CPT | Mod: QW

## 2022-06-14 PROCEDURE — 99214 OFFICE O/P EST MOD 30 MIN: CPT

## 2022-06-14 NOTE — PHYSICAL EXAM
[Alert] : alert [Well Nourished] : well nourished [No Acute Distress] : no acute distress [EOMI] : extra ocular movement intact [Normal Hearing] : hearing was normal [No Respiratory Distress] : no respiratory distress [No Accessory Muscle Use] : no accessory muscle use [Clear to Auscultation] : lungs were clear to auscultation bilaterally [Normal S1, S2] : normal S1 and S2 [Normal Rate] : heart rate was normal [Normal Bowel Sounds] : normal bowel sounds [Not Tender] : non-tender [Soft] : abdomen soft [Normal Gait] : normal gait [No Motor Deficits] : the motor exam was normal [Normal Affect] : the affect was normal [Normal Insight/Judgement] : insight and judgment were intact [Normal Mood] : the mood was normal [Foot Ulcers] : no foot ulcers [de-identified] : BMI 53 [de-identified] : pannus and hernia

## 2022-06-14 NOTE — REVIEW OF SYSTEMS
[Diarrhea] : diarrhea [Fatigue] : no fatigue [Decreased Appetite] : appetite not decreased [Visual Field Defect] : no visual field defect [Dysphagia] : no dysphagia [Dysphonia] : no dysphonia [Chest Pain] : no chest pain [Palpitations] : no palpitations [Shortness Of Breath] : no shortness of breath [Nausea] : no nausea [Constipation] : no constipation [Vomiting] : no vomiting [Cold Intolerance] : no cold intolerance [Heat Intolerance] : no heat intolerance [FreeTextEntry7] : loose stools last night

## 2022-06-14 NOTE — ASSESSMENT
[FreeTextEntry1] : Patient is a 68 yo man with type 2 diabetes, class III obesity here for follow up\par \par 1. Type 2 diabetes\par -patient's A1c is well controlled with POCT A1c of 6.2% today; confirm with serum\par -we discussed side effects of ozempic including nausea/vomiting, stomach pain and pancreatitis. He denies personal/family hx of pancreatic cancer/medullary thyroid cancer/MEN syndrome. He did experience bout of alcohol induced pancreatitis 2021 but GI denied any absolute contraindications to starting GLP1 agonist. He would like to increase the dose to see if he would get further weight loss benefit. Can increase to 0.5 mg weekly but ozempic alone will not allow the patient to achieve normal BMI at this time\par -has followed up with podiatry Dr. Omaira Deluca\par -diet remains liberal, high in calories\par -continue levemir 30 and humalog to 10 units TID with meals if SMBG levels are at goal of 100-130 fasting and <180 2 hours postprandial; continue metformin\par -dilated eye exam annually\par -podiatry follow up\par -avoid injection insulin to hernia side. Can inject to posterior arms or R abdomen\par \par 2. Abnormal thyroid function tests\par -repeat thyroid function testes today\par \par 3. Class III obesity\par -patient is considering endoscopic sleeve gastrectomy and weighing risks vs. benefits\par \par Follow up in 6 months\par

## 2022-06-14 NOTE — HISTORY OF PRESENT ILLNESS
[FreeTextEntry1] : Patient is a 68 yo man with type 2 diabetes, BMI 53, HTN, GLADYS here for diabetes follow up\par \par Type 2 diabetes diagnosed about 2010 based on symptoms of urinary frequency. Patient was started on insulin right away. \par Currently on levemir 30 units at bedtime, novolog 10 units TID before meals, metformin 1000 mg BID and weekly ozempic.\par Patient checks sugars in the morning only: today it was 120s \par Denies hypoglycemia.  Lowest value is about 110 and the highest is about 150\par Breakfast: most simple meal of the day; greek yogurt with mashed fruit; eats lots of carbohydrates; three eggs + kimchi and an orange\par Lunch: still has frozen foods\par Dinner: pasta, likes carbohydrates\par Snacks: melon/honey dew and sugar free cookies, reports eating a lot of oranges\par Does not drink soda; has elvia\par Dilated: October 2021; Dr. Uzair Hood\par Utilizes CPAP for GLADYS\par Patient has seen 3 bariatric surgeons and was told he was not a candidate. Recently saw bariatric surgeon Dr. Duran\par Podiatry: Established care with Dr Omaira Howe

## 2022-06-15 ENCOUNTER — APPOINTMENT (OUTPATIENT)
Dept: INTERNAL MEDICINE | Facility: CLINIC | Age: 67
End: 2022-06-15

## 2022-06-15 LAB
ANION GAP SERPL CALC-SCNC: 13 MMOL/L
BUN SERPL-MCNC: 18 MG/DL
CALCIUM SERPL-MCNC: 9.4 MG/DL
CHLORIDE SERPL-SCNC: 103 MMOL/L
CO2 SERPL-SCNC: 25 MMOL/L
CREAT SERPL-MCNC: 1.31 MG/DL
EGFR: 60 ML/MIN/1.73M2
ESTIMATED AVERAGE GLUCOSE: 131 MG/DL
GLUCOSE SERPL-MCNC: 134 MG/DL
HBA1C MFR BLD HPLC: 6.2 %
POTASSIUM SERPL-SCNC: 4.7 MMOL/L
SODIUM SERPL-SCNC: 141 MMOL/L
T4 FREE SERPL-MCNC: 0.8 NG/DL
TSH SERPL-ACNC: 1.21 UIU/ML

## 2022-06-17 ENCOUNTER — RX RENEWAL (OUTPATIENT)
Age: 67
End: 2022-06-17

## 2022-06-17 ENCOUNTER — TRANSCRIPTION ENCOUNTER (OUTPATIENT)
Age: 67
End: 2022-06-17

## 2022-06-21 LAB — T4 FREE SERPL DIALY-MCNC: 0.91 NG/DL

## 2022-06-22 ENCOUNTER — APPOINTMENT (OUTPATIENT)
Dept: INTERNAL MEDICINE | Facility: CLINIC | Age: 67
End: 2022-06-22

## 2022-06-22 ENCOUNTER — TRANSCRIPTION ENCOUNTER (OUTPATIENT)
Age: 67
End: 2022-06-22

## 2022-06-27 ENCOUNTER — APPOINTMENT (OUTPATIENT)
Dept: PULMONOLOGY | Facility: CLINIC | Age: 67
End: 2022-06-27

## 2022-06-27 VITALS
OXYGEN SATURATION: 97 % | WEIGHT: 315 LBS | TEMPERATURE: 97.3 F | SYSTOLIC BLOOD PRESSURE: 149 MMHG | BODY MASS INDEX: 49.44 KG/M2 | DIASTOLIC BLOOD PRESSURE: 71 MMHG | HEART RATE: 96 BPM | HEIGHT: 67 IN

## 2022-06-27 PROCEDURE — 99213 OFFICE O/P EST LOW 20 MIN: CPT | Mod: 25

## 2022-06-27 PROCEDURE — 90677 PCV20 VACCINE IM: CPT

## 2022-06-27 PROCEDURE — G0009: CPT

## 2022-06-27 NOTE — HISTORY OF PRESENT ILLNESS
[TextBox_4] : My patient since 2016 w asthma maintained on generic flutic/salm 250/50. He has a large hernia which can't be operated on because of obesity, and he can't get laparoscopic bariatric surgery because of the hernia. Never smoker.\par \par 9/11/20: Much more dyspneic on minimal exertion, doesn't feel like asthma, associated w throat discomfort, no chest pain, arm or jaw pain. Walked here from [address], had to stop every 1/2 block. Changed to the generic inhaler without any change. Not wheezing, not coughing, no rest dyspnea. No orthopnea. Last stress test 5 years ago. Much less physically active during pandemic. Probably gained 20 lbs.\par \par 6/27/22: Since seeing me last he had a cath with clean coronaries after a positive nuclear probably false positive from attenuation. And then in the interim the breathing improved spontaneously. No interval exacerbations. Remains on generic f/s. Never had Covid. Vax x 3. He also got a second surgical opinion at [facility] and their opinion was same as our surgeons, and he saw Dr Caputo for consultation about endoscopic lap band but this didn't seem like it was going to be effective enough. [ESS] : 6

## 2022-06-27 NOTE — ASSESSMENT
[FreeTextEntry1] : Data reviewed:\par \par Echo [facility] 8/2020: mild LVH, tr AR, mild TR\par \par PA/lat CXR in office 9/11/20: no focal infiltrate, no sig change\par \par PFT 12/15/16: severe EAO (FEV1 49%) w sig resp to IBD but GOLD II, nl TLC, mildly reduced DLCO / FENO 81\par [place] 4/20/17: moderate obstruction, FEV1 55% / FENO 21\par [place] 10/9/17: mild-mod obstruction, FEV1 70%\par [place] 4/25/18: mod-sev obstruction, FEV1 56%\par [place] 7/8/19: restricted, FEV1 77%\par [place] 9/11/20: restricted, FEV1 73%\par \par Impression:\par Asthma, controlled\par \par Plan:\par Cont flut-salm 250/50 bid.\par [id number] today and that completes pneumococcal vax for him.

## 2022-06-29 ENCOUNTER — APPOINTMENT (OUTPATIENT)
Dept: INTERNAL MEDICINE | Facility: CLINIC | Age: 67
End: 2022-06-29

## 2022-06-30 ENCOUNTER — TRANSCRIPTION ENCOUNTER (OUTPATIENT)
Age: 67
End: 2022-06-30

## 2022-07-01 ENCOUNTER — TRANSCRIPTION ENCOUNTER (OUTPATIENT)
Age: 67
End: 2022-07-01

## 2022-07-01 ENCOUNTER — APPOINTMENT (OUTPATIENT)
Dept: OPHTHALMOLOGY | Facility: CLINIC | Age: 67
End: 2022-07-01

## 2022-07-06 ENCOUNTER — TRANSCRIPTION ENCOUNTER (OUTPATIENT)
Age: 67
End: 2022-07-06

## 2022-07-07 ENCOUNTER — RESULT REVIEW (OUTPATIENT)
Age: 67
End: 2022-07-07

## 2022-07-08 ENCOUNTER — APPOINTMENT (OUTPATIENT)
Dept: INTERNAL MEDICINE | Facility: CLINIC | Age: 67
End: 2022-07-08

## 2022-07-08 ENCOUNTER — TRANSCRIPTION ENCOUNTER (OUTPATIENT)
Age: 67
End: 2022-07-08

## 2022-07-12 ENCOUNTER — APPOINTMENT (OUTPATIENT)
Dept: ULTRASOUND IMAGING | Facility: HOSPITAL | Age: 67
End: 2022-07-12

## 2022-07-12 ENCOUNTER — OUTPATIENT (OUTPATIENT)
Dept: OUTPATIENT SERVICES | Facility: HOSPITAL | Age: 67
LOS: 1 days | End: 2022-07-12
Payer: COMMERCIAL

## 2022-07-12 DIAGNOSIS — Z90.49 ACQUIRED ABSENCE OF OTHER SPECIFIED PARTS OF DIGESTIVE TRACT: Chronic | ICD-10-CM

## 2022-07-12 DIAGNOSIS — Z98.890 OTHER SPECIFIED POSTPROCEDURAL STATES: Chronic | ICD-10-CM

## 2022-07-12 PROCEDURE — 76775 US EXAM ABDO BACK WALL LIM: CPT | Mod: 26

## 2022-07-12 PROCEDURE — 76775 US EXAM ABDO BACK WALL LIM: CPT

## 2022-07-15 ENCOUNTER — APPOINTMENT (OUTPATIENT)
Dept: INTERNAL MEDICINE | Facility: CLINIC | Age: 67
End: 2022-07-15

## 2022-07-15 ENCOUNTER — TRANSCRIPTION ENCOUNTER (OUTPATIENT)
Age: 67
End: 2022-07-15

## 2022-07-21 ENCOUNTER — TRANSCRIPTION ENCOUNTER (OUTPATIENT)
Age: 67
End: 2022-07-21

## 2022-07-22 ENCOUNTER — TRANSCRIPTION ENCOUNTER (OUTPATIENT)
Age: 67
End: 2022-07-22

## 2022-07-22 ENCOUNTER — NON-APPOINTMENT (OUTPATIENT)
Age: 67
End: 2022-07-22

## 2022-07-22 ENCOUNTER — APPOINTMENT (OUTPATIENT)
Dept: INTERNAL MEDICINE | Facility: CLINIC | Age: 67
End: 2022-07-22

## 2022-07-25 ENCOUNTER — NON-APPOINTMENT (OUTPATIENT)
Age: 67
End: 2022-07-25

## 2022-07-25 ENCOUNTER — TRANSCRIPTION ENCOUNTER (OUTPATIENT)
Age: 67
End: 2022-07-25

## 2022-07-29 ENCOUNTER — RX RENEWAL (OUTPATIENT)
Age: 67
End: 2022-07-29

## 2022-07-29 ENCOUNTER — TRANSCRIPTION ENCOUNTER (OUTPATIENT)
Age: 67
End: 2022-07-29

## 2022-07-29 ENCOUNTER — APPOINTMENT (OUTPATIENT)
Dept: INTERNAL MEDICINE | Facility: CLINIC | Age: 67
End: 2022-07-29

## 2022-08-05 ENCOUNTER — APPOINTMENT (OUTPATIENT)
Dept: INTERNAL MEDICINE | Facility: CLINIC | Age: 67
End: 2022-08-05

## 2022-08-05 ENCOUNTER — TRANSCRIPTION ENCOUNTER (OUTPATIENT)
Age: 67
End: 2022-08-05

## 2022-08-12 ENCOUNTER — APPOINTMENT (OUTPATIENT)
Dept: INTERNAL MEDICINE | Facility: CLINIC | Age: 67
End: 2022-08-12

## 2022-08-12 ENCOUNTER — TRANSCRIPTION ENCOUNTER (OUTPATIENT)
Age: 67
End: 2022-08-12

## 2022-08-22 ENCOUNTER — APPOINTMENT (OUTPATIENT)
Dept: INTERNAL MEDICINE | Facility: CLINIC | Age: 67
End: 2022-08-22

## 2022-08-22 ENCOUNTER — TRANSCRIPTION ENCOUNTER (OUTPATIENT)
Age: 67
End: 2022-08-22

## 2022-09-08 ENCOUNTER — APPOINTMENT (OUTPATIENT)
Dept: OPHTHALMOLOGY | Facility: CLINIC | Age: 67
End: 2022-09-08

## 2022-09-08 ENCOUNTER — NON-APPOINTMENT (OUTPATIENT)
Age: 67
End: 2022-09-08

## 2022-09-08 PROCEDURE — 92133 CPTRZD OPH DX IMG PST SGM ON: CPT

## 2022-09-08 PROCEDURE — 92014 COMPRE OPH EXAM EST PT 1/>: CPT

## 2022-09-15 NOTE — PATIENT PROFILE ADULT - NSASFUNCLEVELADLTOILET_GEN_A_NUR
----- Message from Tory Toney, OD sent at 9/15/2022  1:38 PM CDT -----  Hey, please schedule patient for cataract consult next available.     
0 = independent

## 2022-09-19 ENCOUNTER — TRANSCRIPTION ENCOUNTER (OUTPATIENT)
Age: 67
End: 2022-09-19

## 2022-09-20 ENCOUNTER — TRANSCRIPTION ENCOUNTER (OUTPATIENT)
Age: 67
End: 2022-09-20

## 2022-10-03 ENCOUNTER — RX RENEWAL (OUTPATIENT)
Age: 67
End: 2022-10-03

## 2022-10-04 ENCOUNTER — RX RENEWAL (OUTPATIENT)
Age: 67
End: 2022-10-04

## 2022-10-07 ENCOUNTER — APPOINTMENT (OUTPATIENT)
Dept: INTERNAL MEDICINE | Facility: CLINIC | Age: 67
End: 2022-10-07

## 2022-10-07 VITALS
HEART RATE: 86 BPM | BODY MASS INDEX: 48.09 KG/M2 | HEIGHT: 67.5 IN | TEMPERATURE: 98.1 F | OXYGEN SATURATION: 95 % | DIASTOLIC BLOOD PRESSURE: 84 MMHG | WEIGHT: 310 LBS | SYSTOLIC BLOOD PRESSURE: 130 MMHG

## 2022-10-07 DIAGNOSIS — R50.9 FEVER, UNSPECIFIED: ICD-10-CM

## 2022-10-07 PROCEDURE — 99214 OFFICE O/P EST MOD 30 MIN: CPT

## 2022-11-03 ENCOUNTER — RX RENEWAL (OUTPATIENT)
Age: 67
End: 2022-11-03

## 2022-11-06 ENCOUNTER — TRANSCRIPTION ENCOUNTER (OUTPATIENT)
Age: 67
End: 2022-11-06

## 2022-11-07 ENCOUNTER — TRANSCRIPTION ENCOUNTER (OUTPATIENT)
Age: 67
End: 2022-11-07

## 2022-12-06 ENCOUNTER — APPOINTMENT (OUTPATIENT)
Dept: HEART AND VASCULAR | Facility: CLINIC | Age: 67
End: 2022-12-06

## 2022-12-06 VITALS
OXYGEN SATURATION: 96 % | DIASTOLIC BLOOD PRESSURE: 78 MMHG | TEMPERATURE: 98.3 F | RESPIRATION RATE: 15 BRPM | HEIGHT: 67.5 IN | HEART RATE: 95 BPM | BODY MASS INDEX: 46.23 KG/M2 | WEIGHT: 298 LBS | SYSTOLIC BLOOD PRESSURE: 124 MMHG

## 2022-12-06 VITALS
DIASTOLIC BLOOD PRESSURE: 78 MMHG | BODY MASS INDEX: 46.38 KG/M2 | HEIGHT: 67.5 IN | OXYGEN SATURATION: 96 % | TEMPERATURE: 98.3 F | WEIGHT: 298.99 LBS | SYSTOLIC BLOOD PRESSURE: 124 MMHG | HEART RATE: 99 BPM

## 2022-12-06 PROCEDURE — 99213 OFFICE O/P EST LOW 20 MIN: CPT | Mod: 25

## 2022-12-06 PROCEDURE — 93000 ELECTROCARDIOGRAM COMPLETE: CPT

## 2022-12-06 RX ORDER — FERROUS SULFATE 325(65) MG
325 (65 FE) TABLET ORAL DAILY
Refills: 0 | Status: ACTIVE | COMMUNITY

## 2022-12-06 RX ORDER — MULTIVITAMIN
TABLET ORAL DAILY
Refills: 0 | Status: ACTIVE | COMMUNITY

## 2022-12-06 NOTE — REASON FOR VISIT
[FreeTextEntry1] : 66 y/o M with BANKS.  He has to stop when walking.  Even getting dressed causes BANKS.  He does note neck discomfort, better with rest.  Pt has never smoked.  CRFs include HTN, HLD, DM and he also has GLADYS.  \par \par 12/22/20  s/p Nuc, large Inf, IL and apical MI, NL EF, cath only luminal irregularities and echo was also NL...Nuc most likely a false (+) from attenuation.\par 6/1/21  walking more, much less SOB.  Had the Covid Vaccine.   A1c 7.1, LDL 35, , On Ozempic, losing wt.\par 12/7/21 walking much better, good wt loss.\par 12/6/22 Loosing wt, 75 lbs.  Getting strange nasal pain and HA, saw ENT, scan (-).  Also get right flank pain.  US (-).  No CP\par EKG: NSR, PVC, left anterior hemiblock, possible ASWMI,  no ST-Tw abn. 10/16/20\par EKG: NSR, left anterior hemiblock, PRWP, no ST-Tw abn. 12/22/20

## 2022-12-06 NOTE — ASSESSMENT
[FreeTextEntry1] : HTN- BP good on Qunipril and HCTZ\par \par Hypertriglyceridemia- Parallels the glucose, no new meds, 163.  Losing wt nicely.  A1c 6.2\par \par BANKS-  Must exclude CAD.  He also reports neck discomfort with exertion. Plan is to have pt perform a Pharm Nuc stress test at [facility] where the weight limit is higher than on our camera.  If Nuc is abnormal then I would favor a cardiac cath.  Continue Lipitor 40 mg.  Nuc is abnormal revealing a prior infarct with audrey-infarct ischemia of the apex, inferior and inferolateral walls.  Cath discussed in detail.  Pt agrees.  Cath clean\par \par Atypical CP   s/p Nuc, large Inf, IL and apical MI, NL EF, cath only luminal irregularities and echo was also NL...Nuc most likely a false (+) from attenuation.  Prilosec  x 30 days\par \par Hernia- I would clear him.  Losing Weight on Ozempic.  Saw Dr Mukesh Douglas.

## 2022-12-06 NOTE — REVIEW OF SYSTEMS
[Weight Loss (___ Lbs)] : [unfilled] ~Ulb weight loss [Dyspnea on exertion] : dyspnea during exertion [Muscle Cramps] : muscle cramps [Negative] : Heme/Lymph

## 2022-12-19 ENCOUNTER — APPOINTMENT (OUTPATIENT)
Dept: ENDOCRINOLOGY | Facility: CLINIC | Age: 67
End: 2022-12-19

## 2022-12-19 VITALS
HEART RATE: 99 BPM | HEIGHT: 67.5 IN | SYSTOLIC BLOOD PRESSURE: 168 MMHG | TEMPERATURE: 97.3 F | DIASTOLIC BLOOD PRESSURE: 107 MMHG | WEIGHT: 300 LBS | OXYGEN SATURATION: 99 % | BODY MASS INDEX: 46.54 KG/M2

## 2022-12-19 LAB — HBA1C MFR BLD HPLC: 5.5

## 2022-12-19 PROCEDURE — 99214 OFFICE O/P EST MOD 30 MIN: CPT | Mod: 25

## 2022-12-19 PROCEDURE — 83036 HEMOGLOBIN GLYCOSYLATED A1C: CPT | Mod: QW

## 2022-12-19 NOTE — HISTORY OF PRESENT ILLNESS
[FreeTextEntry1] : Patient is a 66 yo man with type 2 diabetes, BMI 45, HTN, GLADYS here for diabetes follow up\par \par Type 2 diabetes diagnosed about 2010 based on symptoms of urinary frequency. He was started on insulin right away. \par Currently on levemir 30 units at bedtime, novolog 10 units TID before meals, metformin 1000 mg BID and weekly ozempic 0.5.  He reports alcohol induced pancreatitis in 2021 but gastroenterology did not state it was an absolute contraindication to GLP 1 agonist.\par Patient checks sugars in the morning only: today it was 86; generally runs in the high 80s \par He has had a few values in the 70s a s well\par Diet: not really "healthier." Reports he is eating less foods.  Dinner last night was Caesar salad with chicken purchased at [employer].  Breakfast this morning was fried plantain.  Frozen foods. He cooks simple things like eggs and hot cereal; pork chops and steak.\par Snack: fresh fruit, dried fruits; over the last week or so he has been eating medjool dates\par Does not drink soda; has seltzer\par Dilated: September 8, 2022; Dr. Uzair Hood notes pre glaucoma and no diabetic retinopathy\par Utilizes CPAP for GLADYS\par Patient has seen 3 bariatric surgeons and was told he was not a candidate. Recently saw bariatric surgeon Dr. Duran\par Podiatry: Established care with Dr Omaira Howe \par

## 2022-12-19 NOTE — PHYSICAL EXAM
[Alert] : alert [Well Nourished] : well nourished [No Acute Distress] : no acute distress [EOMI] : extra ocular movement intact [Normal Hearing] : hearing was normal [No Respiratory Distress] : no respiratory distress [No Accessory Muscle Use] : no accessory muscle use [Clear to Auscultation] : lungs were clear to auscultation bilaterally [Normal S1, S2] : normal S1 and S2 [Normal Rate] : heart rate was normal [Normal Bowel Sounds] : normal bowel sounds [Not Tender] : non-tender [Soft] : abdomen soft [Normal Gait] : normal gait [Foot Ulcers] : no foot ulcers [No Motor Deficits] : the motor exam was normal [Normal Affect] : the affect was normal [Normal Insight/Judgement] : insight and judgment were intact [Normal Mood] : the mood was normal [de-identified] : BMI 46 [de-identified] : pannus and hernia

## 2022-12-19 NOTE — ASSESSMENT
[Diabetes Foot Care] : diabetes foot care [Long Term Vascular Complications] : long term vascular complications of diabetes [Carbohydrate Consistent Diet] : carbohydrate consistent diet [Importance of Diet and Exercise] : importance of diet and exercise to improve glycemic control, achieve weight loss and improve cardiovascular health [Self Monitoring of Blood Glucose] : self monitoring of blood glucose [Retinopathy Screening] : Patient was referred to ophthalmology for retinopathy screening [FreeTextEntry1] : Patient is a 66 yo man with class III obesity, type 2 diabetes and HTN here for follow up\par \par 1. T2DM\par -patient's POCT A1c today is perfect at 5.5% and the best it has ever been\par -he reports some hypoglycemia to the 70's.  As such, we will decrease levemir to 27 at bedtime and novolog 7 units TID with meals.  Educated that he should check some post-prandial glucose levels to ensure they are at goal of < 180. Continue with metformin 1000 mg BID and ozempic 0.5 mg weekly\par -diet remains fairly liberal; high in purchased/frozen foods and calories\par -up to date with podiatry exam\par -up to date with dilated eye exam\par -he has PCP visit coming up this week and can get necessary blood work at that time\par \par 2. Class III obesity\par -since starting Ozempic, he has had significant weight loss. BMI has gone from 53 to 46 today\par -he is without reported side effects\par \par 3. HTN\par -BP is elevated today. He just walked into the office and has not had time to rest\par -two weeks ago, at cardiology, BP was at goal\par -he can check BP at home during rest and see cardiology/internal medicine for routine care\par \par Follow up in 4 months

## 2022-12-23 ENCOUNTER — APPOINTMENT (OUTPATIENT)
Dept: INTERNAL MEDICINE | Facility: CLINIC | Age: 67
End: 2022-12-23

## 2022-12-23 ENCOUNTER — TRANSCRIPTION ENCOUNTER (OUTPATIENT)
Age: 67
End: 2022-12-23

## 2022-12-23 VITALS
WEIGHT: 309 LBS | HEART RATE: 102 BPM | DIASTOLIC BLOOD PRESSURE: 81 MMHG | BODY MASS INDEX: 47.93 KG/M2 | HEIGHT: 67.5 IN | SYSTOLIC BLOOD PRESSURE: 131 MMHG | OXYGEN SATURATION: 96 % | TEMPERATURE: 97 F

## 2022-12-23 DIAGNOSIS — K43.9 VENTRAL HERNIA W/OUT OBSTRUCTION OR GANGRENE: ICD-10-CM

## 2022-12-23 DIAGNOSIS — Z23 ENCOUNTER FOR IMMUNIZATION: ICD-10-CM

## 2022-12-23 DIAGNOSIS — Z00.00 ENCOUNTER FOR GENERAL ADULT MEDICAL EXAMINATION W/OUT ABNORMAL FINDINGS: ICD-10-CM

## 2022-12-23 DIAGNOSIS — F32.A DEPRESSION, UNSPECIFIED: ICD-10-CM

## 2022-12-23 PROCEDURE — 99397 PER PM REEVAL EST PAT 65+ YR: CPT | Mod: 25

## 2022-12-23 PROCEDURE — 90662 IIV NO PRSV INCREASED AG IM: CPT

## 2022-12-23 PROCEDURE — G0008: CPT

## 2022-12-23 PROCEDURE — 36415 COLL VENOUS BLD VENIPUNCTURE: CPT

## 2022-12-23 RX ORDER — ECONAZOLE NITRATE 10 MG/G
1 CREAM TOPICAL
Qty: 85 | Refills: 0 | Status: DISCONTINUED | COMMUNITY
Start: 2021-05-20 | End: 2022-12-23

## 2022-12-26 ENCOUNTER — TRANSCRIPTION ENCOUNTER (OUTPATIENT)
Age: 67
End: 2022-12-26

## 2022-12-27 ENCOUNTER — TRANSCRIPTION ENCOUNTER (OUTPATIENT)
Age: 67
End: 2022-12-27

## 2022-12-27 LAB
ALBUMIN SERPL ELPH-MCNC: 4 G/DL
ALP BLD-CCNC: 87 U/L
ALT SERPL-CCNC: 19 U/L
ANION GAP SERPL CALC-SCNC: 11 MMOL/L
AST SERPL-CCNC: 15 U/L
BILIRUB SERPL-MCNC: 0.3 MG/DL
BUN SERPL-MCNC: 18 MG/DL
CALCIUM SERPL-MCNC: 9 MG/DL
CHLORIDE SERPL-SCNC: 106 MMOL/L
CO2 SERPL-SCNC: 26 MMOL/L
CREAT SERPL-MCNC: 1.15 MG/DL
EGFR: 70 ML/MIN/1.73M2
GLUCOSE SERPL-MCNC: 122 MG/DL
POTASSIUM SERPL-SCNC: 4.1 MMOL/L
PROT SERPL-MCNC: 6.3 G/DL
SODIUM SERPL-SCNC: 143 MMOL/L

## 2022-12-27 RX ORDER — INSULIN DETEMIR 100 [IU]/ML
100 INJECTION, SOLUTION SUBCUTANEOUS
Qty: 45 | Refills: 0 | Status: DISCONTINUED | COMMUNITY
Start: 2017-06-12 | End: 2022-12-27

## 2023-01-23 ENCOUNTER — TRANSCRIPTION ENCOUNTER (OUTPATIENT)
Age: 68
End: 2023-01-23

## 2023-01-29 ENCOUNTER — TRANSCRIPTION ENCOUNTER (OUTPATIENT)
Age: 68
End: 2023-01-29

## 2023-01-30 ENCOUNTER — TRANSCRIPTION ENCOUNTER (OUTPATIENT)
Age: 68
End: 2023-01-30

## 2023-02-03 ENCOUNTER — APPOINTMENT (OUTPATIENT)
Dept: PULMONOLOGY | Facility: CLINIC | Age: 68
End: 2023-02-03
Payer: MEDICARE

## 2023-02-03 ENCOUNTER — RX RENEWAL (OUTPATIENT)
Age: 68
End: 2023-02-03

## 2023-02-03 PROCEDURE — 99442: CPT

## 2023-02-03 NOTE — HISTORY OF PRESENT ILLNESS
[TextBox_4] : My patient since 2016 w asthma maintained on generic flutic/salm 250/50. He has a large hernia which can't be operated on because of obesity, and he can't get laparoscopic bariatric surgery because of the hernia. Never smoker.\par \par 9/11/20: Much more dyspneic on minimal exertion, doesn't feel like asthma, associated w throat discomfort, no chest pain, arm or jaw pain. Walked here from [address], had to stop every 1/2 block. Changed to the generic inhaler without any change. Not wheezing, not coughing, no rest dyspnea. No orthopnea. Last stress test 5 years ago. Much less physically active during pandemic. Probably gained 20 lbs.\par \par 6/27/22: Since seeing me last he had a cath with clean coronaries after a positive nuclear probably false positive from attenuation. And then in the interim the breathing improved spontaneously. No interval exacerbations. Remains on generic f/s. Never had Covid. Vax x 3. He also got a second surgical opinion at [facility] and their opinion was same as our surgeons, and he saw Dr Caputo for consultation about endoscopic lap band but this didn't seem like it was going to be effective enough.\par \par 2/3/23: Follow up visit conducted by telehealth due to Covid pandemic. The patient gives consent for telehealth services, the patient and I are both in [facility], and the patient and I are the only participants on the call.  He was diagnosed w GLADYS about 12 years ago and still has this original machine. He had an in-lab test at [facility] and was told his GLADYS was severe w AHI about 80. His machine was one of the recalled Watt machines. It was otherwise in working order. It's set at 13 cm H2O. He registered his machine and was eventually offered a new machine but nothing happened. Now they are offering him a new autotitrating machine but need an rx. Is trying to lose enough weight to get his hernia operated on. Has lost about 40 lbs. Was on Ozempic but currently there is a national shortage. With this degree of weight loss he notices less dyspnea on exertion.\par

## 2023-02-03 NOTE — ASSESSMENT
[FreeTextEntry1] : Data reviewed:\par \par Echo [facility] 8/2020: mild LVH, tr AR, mild TR\par \par PA/lat CXR in office 9/11/20: no focal infiltrate, no sig change\par \par PFT 12/15/16: severe EAO (FEV1 49%) w sig resp to IBD but GOLD II, nl TLC, mildly reduced DLCO / FENO 81\par [place] 4/20/17: moderate obstruction, FEV1 55% / FENO 21\par [place] 10/9/17: mild-mod obstruction, FEV1 70%\par [place] 4/25/18: mod-sev obstruction, FEV1 56%\par [place] 7/8/19: restricted, FEV1 77%\par [place] 9/11/20: restricted, FEV1 73%\par \par Impression:\par GLADYS, severe by his report\par Asthma, controlled\par \par Plan:\par I endorse the idea of getting an autotitrating machine and so he seems to not need anything from me. However, if he needs something he can call. I would be comfortable rx'ing PAP for him based on his story but if insurance was paying he'd need a new sleep study.\par Cont flut-salm 250/50 bid for the asthma.

## 2023-02-06 ENCOUNTER — TRANSCRIPTION ENCOUNTER (OUTPATIENT)
Age: 68
End: 2023-02-06

## 2023-02-21 ENCOUNTER — RX RENEWAL (OUTPATIENT)
Age: 68
End: 2023-02-21

## 2023-02-27 ENCOUNTER — TRANSCRIPTION ENCOUNTER (OUTPATIENT)
Age: 68
End: 2023-02-27

## 2023-03-03 ENCOUNTER — TRANSCRIPTION ENCOUNTER (OUTPATIENT)
Age: 68
End: 2023-03-03

## 2023-03-06 ENCOUNTER — TRANSCRIPTION ENCOUNTER (OUTPATIENT)
Age: 68
End: 2023-03-06

## 2023-03-20 ENCOUNTER — APPOINTMENT (OUTPATIENT)
Dept: OPHTHALMOLOGY | Facility: CLINIC | Age: 68
End: 2023-03-20
Payer: MEDICARE

## 2023-03-20 ENCOUNTER — NON-APPOINTMENT (OUTPATIENT)
Age: 68
End: 2023-03-20

## 2023-03-20 PROCEDURE — 92014 COMPRE OPH EXAM EST PT 1/>: CPT

## 2023-03-20 PROCEDURE — 92133 CPTRZD OPH DX IMG PST SGM ON: CPT

## 2023-04-18 ENCOUNTER — APPOINTMENT (OUTPATIENT)
Dept: ENDOCRINOLOGY | Facility: CLINIC | Age: 68
End: 2023-04-18
Payer: MEDICARE

## 2023-04-18 VITALS
BODY MASS INDEX: 44.83 KG/M2 | DIASTOLIC BLOOD PRESSURE: 86 MMHG | WEIGHT: 289 LBS | SYSTOLIC BLOOD PRESSURE: 144 MMHG | HEART RATE: 82 BPM | HEIGHT: 67.5 IN | TEMPERATURE: 97.3 F | OXYGEN SATURATION: 95 % | RESPIRATION RATE: 16 BRPM

## 2023-04-18 DIAGNOSIS — R94.6 ABNORMAL RESULTS OF THYROID FUNCTION STUDIES: ICD-10-CM

## 2023-04-18 LAB — HBA1C MFR BLD HPLC: 5.3

## 2023-04-18 PROCEDURE — 83036 HEMOGLOBIN GLYCOSYLATED A1C: CPT | Mod: QW

## 2023-04-18 PROCEDURE — 99214 OFFICE O/P EST MOD 30 MIN: CPT | Mod: 25

## 2023-04-18 NOTE — REVIEW OF SYSTEMS
[Fatigue] : no fatigue [Decreased Appetite] : appetite not decreased [Visual Field Defect] : no visual field defect [Dysphagia] : no dysphagia [Dysphonia] : no dysphonia [Chest Pain] : no chest pain [Palpitations] : no palpitations [Shortness Of Breath] : no shortness of breath [Nausea] : no nausea [Constipation] : no constipation [Vomiting] : no vomiting [Diarrhea] : no diarrhea [Headaches] : no headaches [Tremors] : no tremors [Depression] : no depression [Cold Intolerance] : no cold intolerance [Heat Intolerance] : no heat intolerance

## 2023-04-18 NOTE — HISTORY OF PRESENT ILLNESS
[FreeTextEntry1] : Patient is a 68 yo man with type 2 diabetes, BMI 45, HTN, GLADYS here for diabetes follow up\par \par Type 2 diabetes diagnosed about 2010 based on symptoms of urinary frequency. He was started on insulin right away. \par Currently on levemir 27 units at bedtime, novolog 7 units TID before meals, metformin 1000 mg BID and weekly ozempic 0.5. He reports alcohol induced pancreatitis in 2021 but gastroenterology did not state it was an absolute contraindication to GLP 1 agonist.\par Patient checks sugars in the morning only: today it was 86; generally runs in the high 90-150s \par Reports he is being more diligent about checking sugars after meals\par Two months ago, he fell down in his apartment and hurt his knee.  He had leg pain and knee bruise  \par Diet: not really "healthier." Reports he is eating less foods. Dinner last night was Caesar salad with chicken purchased at [employer]. Breakfast this morning granola with yogurt. Dinner last night was chicken franchise purcahsed at the deli\par Snack: fresh fruit, dried fruits; tries to avoid sugars but "doesn't mean I avoid sweet." \par Does not drink soda; has robber\par Dilated: March 2023\par Utilizes CPAP for GLADYS\par Patient has seen 3 bariatric surgeons and was told he was not a candidate. Recently saw bariatric surgeon Dr. Duran\par Podiatry: Established care with Dr Omaira Howe \par

## 2023-04-18 NOTE — PHYSICAL EXAM
[Alert] : alert [Well Nourished] : well nourished [No Acute Distress] : no acute distress [EOMI] : extra ocular movement intact [Normal Hearing] : hearing was normal [No Respiratory Distress] : no respiratory distress [No Accessory Muscle Use] : no accessory muscle use [Clear to Auscultation] : lungs were clear to auscultation bilaterally [Normal S1, S2] : normal S1 and S2 [Normal Rate] : heart rate was normal [Normal Bowel Sounds] : normal bowel sounds [Not Tender] : non-tender [Soft] : abdomen soft [Normal Gait] : normal gait [Foot Ulcers] : no foot ulcers [No Motor Deficits] : the motor exam was normal [Normal Affect] : the affect was normal [Normal Insight/Judgement] : insight and judgment were intact [Normal Mood] : the mood was normal [de-identified] : BMI 47 [de-identified] : pannus and hernia

## 2023-04-18 NOTE — ASSESSMENT
[Diabetes Foot Care] : diabetes foot care [Long Term Vascular Complications] : long term vascular complications of diabetes [Carbohydrate Consistent Diet] : carbohydrate consistent diet [Importance of Diet and Exercise] : importance of diet and exercise to improve glycemic control, achieve weight loss and improve cardiovascular health [Exercise/Effect on Glucose] : exercise/effect on glucose [Hypoglycemia Management] : hypoglycemia management [Action and use of Insulin] : action and use of short and long-acting insulin [Self Monitoring of Blood Glucose] : self monitoring of blood glucose [Injection Technique, Storage, Sharps Disposal] : injection technique, storage, and sharps disposal [Retinopathy Screening] : Patient was referred to ophthalmology for retinopathy screening [Weight Loss] : weight loss [Diabetic Medications] : Risks and benefits of diabetic medications were discussed [FreeTextEntry1] : Patient is a 68 yo man with class III obesity, type 2 diabetes and HTN here for follow up\par \par 1. T2DM\par -patient's POCT A1c today is perfect at 5.3% and the best it has ever been\par -he reports some hypoglycemia to the 70's. As such, we will continue to decrease levemir to 24 at bedtime and novolog to 5 units TID with meals. Patient adheres to checking post-prandial glucose levels and values are at goal. Continue with metformin 1000 mg BID and ozempic 0.5 mg weekly\par -diet remains fairly liberal; high in purchased/frozen foods and calories\par -up to date with podiatry exam\par -up to date with dilated eye exam\par -check albumin/creatinine\par -confirm serum A1c, obtain BMP and lipid profile\par \par 2. Class III obesity\par -since starting Ozempic, he has had significant weight loss. BMI has gone from 53 to 46 today\par -he is without reported side effects\par -we have had discussions about potential risks of pancreatitis.  He is aware.  Denies hx of MEN/MTC.\par -today, he asked about Mounjaro.  We discussed that side effect profile is similar but may confer a bit more weight loss benefit.  Patient prefers ozempic but will let me know if he wants to change therapy\par \par 3. Abnormal TFTs\par -repeat TSH, free T4 levels\par \par Follow up in 6 months. \par \par

## 2023-04-19 LAB
ALBUMIN SERPL ELPH-MCNC: 4.1 G/DL
ALP BLD-CCNC: 85 U/L
ALT SERPL-CCNC: 20 U/L
ANION GAP SERPL CALC-SCNC: 12 MMOL/L
AST SERPL-CCNC: 18 U/L
BILIRUB SERPL-MCNC: 0.4 MG/DL
BUN SERPL-MCNC: 18 MG/DL
CALCIUM SERPL-MCNC: 10 MG/DL
CHLORIDE SERPL-SCNC: 105 MMOL/L
CHOLEST SERPL-MCNC: 121 MG/DL
CO2 SERPL-SCNC: 26 MMOL/L
CREAT SERPL-MCNC: 1.12 MG/DL
CREAT SPEC-SCNC: 272 MG/DL
EGFR: 72 ML/MIN/1.73M2
ESTIMATED AVERAGE GLUCOSE: 108 MG/DL
GLUCOSE SERPL-MCNC: 86 MG/DL
HBA1C MFR BLD HPLC: 5.4 %
HDLC SERPL-MCNC: 50 MG/DL
LDLC SERPL CALC-MCNC: 34 MG/DL
MICROALBUMIN 24H UR DL<=1MG/L-MCNC: 5.8 MG/DL
MICROALBUMIN/CREAT 24H UR-RTO: 21 MG/G
NONHDLC SERPL-MCNC: 71 MG/DL
POTASSIUM SERPL-SCNC: 4.4 MMOL/L
PROT SERPL-MCNC: 6.5 G/DL
SODIUM SERPL-SCNC: 142 MMOL/L
T3 SERPL-MCNC: 95 NG/DL
T4 FREE SERPL-MCNC: 0.9 NG/DL
TRIGL SERPL-MCNC: 184 MG/DL
TSH SERPL-ACNC: 1.24 UIU/ML

## 2023-04-26 LAB — T4 FREE SERPL DIALY-MCNC: 0.93 NG/DL

## 2023-04-28 ENCOUNTER — RX RENEWAL (OUTPATIENT)
Age: 68
End: 2023-04-28

## 2023-05-03 DIAGNOSIS — R21 RASH AND OTHER NONSPECIFIC SKIN ERUPTION: ICD-10-CM

## 2023-05-08 ENCOUNTER — RX RENEWAL (OUTPATIENT)
Age: 68
End: 2023-05-08

## 2023-05-19 ENCOUNTER — TRANSCRIPTION ENCOUNTER (OUTPATIENT)
Age: 68
End: 2023-05-19

## 2023-05-19 RX ORDER — SEMAGLUTIDE 0.68 MG/ML
2 INJECTION, SOLUTION SUBCUTANEOUS
Qty: 4 | Refills: 3 | Status: ACTIVE | COMMUNITY
Start: 2021-04-30 | End: 1900-01-01

## 2023-07-04 ENCOUNTER — RX RENEWAL (OUTPATIENT)
Age: 68
End: 2023-07-04

## 2023-07-11 ENCOUNTER — TRANSCRIPTION ENCOUNTER (OUTPATIENT)
Age: 68
End: 2023-07-11

## 2023-07-11 RX ORDER — DULOXETINE HYDROCHLORIDE 20 MG/1
20 CAPSULE, DELAYED RELEASE PELLETS ORAL DAILY
Qty: 60 | Refills: 3 | Status: ACTIVE | COMMUNITY
Start: 2023-03-03 | End: 1900-01-01

## 2023-07-28 ENCOUNTER — RX RENEWAL (OUTPATIENT)
Age: 68
End: 2023-07-28

## 2023-08-04 ENCOUNTER — RX RENEWAL (OUTPATIENT)
Age: 68
End: 2023-08-04

## 2023-09-09 RX ORDER — FLUTICASONE PROPIONATE AND SALMETEROL 250; 50 UG/1; UG/1
250-50 POWDER RESPIRATORY (INHALATION)
Qty: 3 | Refills: 3 | Status: ACTIVE | COMMUNITY
Start: 2018-04-25 | End: 1900-01-01

## 2023-09-26 NOTE — ED PROVIDER NOTE - PMH
DM type 2 (diabetes mellitus, type 2)    HLD (hyperlipidemia)    HTN (hypertension)    Obesity
Him/He

## 2023-10-10 ENCOUNTER — RX RENEWAL (OUTPATIENT)
Age: 68
End: 2023-10-10

## 2023-10-16 ENCOUNTER — TRANSCRIPTION ENCOUNTER (OUTPATIENT)
Age: 68
End: 2023-10-16

## 2023-10-18 ENCOUNTER — APPOINTMENT (OUTPATIENT)
Dept: ENDOCRINOLOGY | Facility: CLINIC | Age: 68
End: 2023-10-18
Payer: MEDICARE

## 2023-10-18 VITALS
TEMPERATURE: 97.3 F | SYSTOLIC BLOOD PRESSURE: 167 MMHG | BODY MASS INDEX: 47.78 KG/M2 | DIASTOLIC BLOOD PRESSURE: 101 MMHG | HEART RATE: 77 BPM | HEIGHT: 67.5 IN | OXYGEN SATURATION: 95 % | WEIGHT: 308 LBS

## 2023-10-18 VITALS — SYSTOLIC BLOOD PRESSURE: 159 MMHG | DIASTOLIC BLOOD PRESSURE: 89 MMHG

## 2023-10-18 LAB — HBA1C MFR BLD HPLC: 5.6

## 2023-10-18 PROCEDURE — 83036 HEMOGLOBIN GLYCOSYLATED A1C: CPT | Mod: QW

## 2023-10-18 PROCEDURE — 99214 OFFICE O/P EST MOD 30 MIN: CPT | Mod: 25

## 2023-10-19 ENCOUNTER — NON-APPOINTMENT (OUTPATIENT)
Age: 68
End: 2023-10-19

## 2023-10-19 ENCOUNTER — APPOINTMENT (OUTPATIENT)
Dept: OPHTHALMOLOGY | Facility: CLINIC | Age: 68
End: 2023-10-19
Payer: MEDICARE

## 2023-10-19 PROCEDURE — 92133 CPTRZD OPH DX IMG PST SGM ON: CPT

## 2023-10-19 PROCEDURE — 92014 COMPRE OPH EXAM EST PT 1/>: CPT

## 2023-10-21 ENCOUNTER — NON-APPOINTMENT (OUTPATIENT)
Age: 68
End: 2023-10-21

## 2023-10-25 ENCOUNTER — RX RENEWAL (OUTPATIENT)
Age: 68
End: 2023-10-25

## 2023-10-31 ENCOUNTER — RX RENEWAL (OUTPATIENT)
Age: 68
End: 2023-10-31

## 2023-10-31 ENCOUNTER — LABORATORY RESULT (OUTPATIENT)
Age: 68
End: 2023-10-31

## 2023-10-31 ENCOUNTER — RESULT REVIEW (OUTPATIENT)
Age: 68
End: 2023-10-31

## 2023-11-01 ENCOUNTER — APPOINTMENT (OUTPATIENT)
Dept: DERMATOLOGY | Facility: CLINIC | Age: 68
End: 2023-11-01
Payer: MEDICARE

## 2023-11-01 VITALS — BODY MASS INDEX: 48.65 KG/M2 | WEIGHT: 310 LBS | HEIGHT: 67 IN

## 2023-11-01 DIAGNOSIS — D23.9 OTHER BENIGN NEOPLASM OF SKIN, UNSPECIFIED: ICD-10-CM

## 2023-11-01 DIAGNOSIS — L29.9 PRURITUS, UNSPECIFIED: ICD-10-CM

## 2023-11-01 DIAGNOSIS — I89.0 LYMPHEDEMA, NOT ELSEWHERE CLASSIFIED: ICD-10-CM

## 2023-11-01 DIAGNOSIS — R22.31 LOCALIZED SWELLING, MASS AND LUMP, RIGHT UPPER LIMB: ICD-10-CM

## 2023-11-01 PROCEDURE — 99204 OFFICE O/P NEW MOD 45 MIN: CPT | Mod: 25

## 2023-11-01 PROCEDURE — 11103 TANGNTL BX SKIN EA SEP/ADDL: CPT

## 2023-11-01 PROCEDURE — 11102 TANGNTL BX SKIN SINGLE LES: CPT

## 2023-11-01 RX ORDER — FLUTICASONE PROPIONATE AND SALMETEROL 500; 50 UG/1; UG/1
POWDER RESPIRATORY (INHALATION)
Refills: 0 | Status: ACTIVE | COMMUNITY

## 2023-11-01 RX ORDER — HYDROCORTISONE 25 MG/G
2.5 CREAM TOPICAL
Qty: 1 | Refills: 2 | Status: ACTIVE | COMMUNITY
Start: 2023-11-01 | End: 1900-01-01

## 2023-11-03 ENCOUNTER — TRANSCRIPTION ENCOUNTER (OUTPATIENT)
Age: 68
End: 2023-11-03

## 2023-11-06 ENCOUNTER — TRANSCRIPTION ENCOUNTER (OUTPATIENT)
Age: 68
End: 2023-11-06

## 2023-11-14 ENCOUNTER — RX RENEWAL (OUTPATIENT)
Age: 68
End: 2023-11-14

## 2023-11-17 ENCOUNTER — TRANSCRIPTION ENCOUNTER (OUTPATIENT)
Age: 68
End: 2023-11-17

## 2023-11-29 ENCOUNTER — NON-APPOINTMENT (OUTPATIENT)
Age: 68
End: 2023-11-29

## 2023-12-04 ENCOUNTER — APPOINTMENT (OUTPATIENT)
Dept: DERMATOLOGY | Facility: CLINIC | Age: 68
End: 2023-12-04
Payer: MEDICARE

## 2023-12-04 DIAGNOSIS — L29.1 PRURITUS SCROTI: ICD-10-CM

## 2023-12-04 DIAGNOSIS — D48.9 NEOPLASM OF UNCERTAIN BEHAVIOR, UNSPECIFIED: ICD-10-CM

## 2023-12-04 DIAGNOSIS — D48.5 NEOPLASM OF UNCERTAIN BEHAVIOR OF SKIN: ICD-10-CM

## 2023-12-04 PROCEDURE — 11603 EXC TR-EXT MAL+MARG 2.1-3 CM: CPT

## 2023-12-04 PROCEDURE — 12032 INTMD RPR S/A/T/EXT 2.6-7.5: CPT

## 2023-12-05 PROBLEM — L29.1 SCROTAL ITCHING: Status: RESOLVED | Noted: 2023-11-01 | Resolved: 2023-12-05

## 2023-12-07 ENCOUNTER — NON-APPOINTMENT (OUTPATIENT)
Age: 68
End: 2023-12-07

## 2023-12-11 ENCOUNTER — RX RENEWAL (OUTPATIENT)
Age: 68
End: 2023-12-11

## 2023-12-15 LAB — DERMATOLOGY BIOPSY: NORMAL

## 2023-12-18 ENCOUNTER — TRANSCRIPTION ENCOUNTER (OUTPATIENT)
Age: 68
End: 2023-12-18

## 2023-12-27 ENCOUNTER — APPOINTMENT (OUTPATIENT)
Dept: INTERNAL MEDICINE | Facility: CLINIC | Age: 68
End: 2023-12-27

## 2023-12-28 ENCOUNTER — APPOINTMENT (OUTPATIENT)
Dept: INTERNAL MEDICINE | Facility: CLINIC | Age: 68
End: 2023-12-28
Payer: MEDICARE

## 2023-12-28 VITALS
BODY MASS INDEX: 47.09 KG/M2 | HEIGHT: 67 IN | WEIGHT: 300 LBS | DIASTOLIC BLOOD PRESSURE: 87 MMHG | TEMPERATURE: 97.3 F | SYSTOLIC BLOOD PRESSURE: 135 MMHG | HEART RATE: 94 BPM | OXYGEN SATURATION: 95 %

## 2023-12-28 DIAGNOSIS — K51.30 ULCERATIVE (CHRONIC) RECTOSIGMOIDITIS W/OUT COMPLICATIONS: ICD-10-CM

## 2023-12-28 DIAGNOSIS — E66.01 MORBID (SEVERE) OBESITY DUE TO EXCESS CALORIES: ICD-10-CM

## 2023-12-28 PROCEDURE — 36415 COLL VENOUS BLD VENIPUNCTURE: CPT

## 2023-12-28 PROCEDURE — 99214 OFFICE O/P EST MOD 30 MIN: CPT | Mod: 25

## 2023-12-28 RX ORDER — INSULIN GLARGINE 100 [IU]/ML
100 INJECTION, SOLUTION SUBCUTANEOUS
Qty: 2 | Refills: 3 | Status: DISCONTINUED | COMMUNITY
Start: 2022-12-27 | End: 2023-12-28

## 2023-12-28 NOTE — PHYSICAL EXAM
[No Acute Distress] : no acute distress [Well Nourished] : well nourished [Well Developed] : well developed [Well-Appearing] : well-appearing [Normal Sclera/Conjunctiva] : normal sclera/conjunctiva [PERRL] : pupils equal round and reactive to light [EOMI] : extraocular movements intact [Normal Outer Ear/Nose] : the outer ears and nose were normal in appearance [Normal Oropharynx] : the oropharynx was normal [No JVD] : no jugular venous distention [No Lymphadenopathy] : no lymphadenopathy [Supple] : supple [Thyroid Normal, No Nodules] : the thyroid was normal and there were no nodules present [No Respiratory Distress] : no respiratory distress  [No Accessory Muscle Use] : no accessory muscle use [Clear to Auscultation] : lungs were clear to auscultation bilaterally [Normal Rate] : normal rate  [Regular Rhythm] : with a regular rhythm [Normal S1, S2] : normal S1 and S2 [No Murmur] : no murmur heard [No Carotid Bruits] : no carotid bruits [No Abdominal Bruit] : a ~M bruit was not heard ~T in the abdomen [No Varicosities] : no varicosities [Pedal Pulses Present] : the pedal pulses are present [No Edema] : there was no peripheral edema [No Palpable Aorta] : no palpable aorta [No Extremity Clubbing/Cyanosis] : no extremity clubbing/cyanosis [Soft] : abdomen soft [Non Tender] : non-tender [Non-distended] : non-distended [No Masses] : no abdominal mass palpated [No HSM] : no HSM [Normal Bowel Sounds] : normal bowel sounds [Obese] : obese [Normal Posterior Cervical Nodes] : no posterior cervical lymphadenopathy [Normal Anterior Cervical Nodes] : no anterior cervical lymphadenopathy [No CVA Tenderness] : no CVA  tenderness [No Spinal Tenderness] : no spinal tenderness [No Joint Swelling] : no joint swelling [Grossly Normal Strength/Tone] : grossly normal strength/tone [No Rash] : no rash [Coordination Grossly Intact] : coordination grossly intact [No Focal Deficits] : no focal deficits [Normal Gait] : normal gait [Deep Tendon Reflexes (DTR)] : deep tendon reflexes were 2+ and symmetric [Normal Affect] : the affect was normal [Normal Insight/Judgement] : insight and judgment were intact

## 2023-12-29 NOTE — HISTORY OF PRESENT ILLNESS
[FreeTextEntry1] : CPE [de-identified] : This is a 67 yo M w/ PMHx of T2DM, morbid obesity, HTN, severe ventral hernia who presents today for his annual CPE. He states that for the past couple of weeks he has noticed that he has an upset stomach with diarrhea. He first thought it was associated with coffee but the sx continued. He has a remote hx of UC that was followed with GI with a colonoscopy in 2019 but was unable to definitely be seen during scan. He has not followed up with GI since then. No changes in diet, no sick contacts, no alcohol or smoking hx. Has been taking ozempic for many years for weight loss. Taking pepto bismul with no relief. Saw bariatric surgery in 2018 for management of sev ventral hernia and was recommended to lose weight.

## 2023-12-29 NOTE — ASSESSMENT
[FreeTextEntry1] : #HCM -UTD on COVID and flu vaccine - Sent patient to Pharmacy for RSV and Shingles

## 2024-01-02 ENCOUNTER — TRANSCRIPTION ENCOUNTER (OUTPATIENT)
Age: 69
End: 2024-01-02

## 2024-01-02 LAB
ALBUMIN SERPL ELPH-MCNC: 4.1 G/DL
ALP BLD-CCNC: 74 U/L
ALT SERPL-CCNC: 24 U/L
ANION GAP SERPL CALC-SCNC: 16 MMOL/L
AST SERPL-CCNC: 20 U/L
BASOPHILS # BLD AUTO: 0.1 K/UL
BASOPHILS NFR BLD AUTO: 1.3 %
BILIRUB SERPL-MCNC: 0.4 MG/DL
BUN SERPL-MCNC: 22 MG/DL
CALCIUM SERPL-MCNC: 9.6 MG/DL
CHLORIDE SERPL-SCNC: 104 MMOL/L
CHOLEST SERPL-MCNC: 146 MG/DL
CO2 SERPL-SCNC: 21 MMOL/L
CREAT SERPL-MCNC: 1.02 MG/DL
EGFR: 80 ML/MIN/1.73M2
EOSINOPHIL # BLD AUTO: 0.39 K/UL
EOSINOPHIL NFR BLD AUTO: 5 %
GLUCOSE SERPL-MCNC: 102 MG/DL
HCT VFR BLD CALC: 41.6 %
HDLC SERPL-MCNC: 56 MG/DL
HGB BLD-MCNC: 13.3 G/DL
IMM GRANULOCYTES NFR BLD AUTO: 0.6 %
LDLC SERPL CALC-MCNC: 63 MG/DL
LPL SERPL-CCNC: 44 U/L
LYMPHOCYTES # BLD AUTO: 1.47 K/UL
LYMPHOCYTES NFR BLD AUTO: 19 %
MAN DIFF?: NORMAL
MCHC RBC-ENTMCNC: 29.4 PG
MCHC RBC-ENTMCNC: 32 GM/DL
MCV RBC AUTO: 91.8 FL
MONOCYTES # BLD AUTO: 0.68 K/UL
MONOCYTES NFR BLD AUTO: 8.8 %
NEUTROPHILS # BLD AUTO: 5.04 K/UL
NEUTROPHILS NFR BLD AUTO: 65.3 %
NONHDLC SERPL-MCNC: 90 MG/DL
PLATELET # BLD AUTO: 272 K/UL
POTASSIUM SERPL-SCNC: 4.3 MMOL/L
PROT SERPL-MCNC: 6.3 G/DL
RBC # BLD: 4.53 M/UL
RBC # FLD: 14.3 %
SODIUM SERPL-SCNC: 142 MMOL/L
TRIGL SERPL-MCNC: 161 MG/DL
WBC # FLD AUTO: 7.73 K/UL

## 2024-01-05 ENCOUNTER — TRANSCRIPTION ENCOUNTER (OUTPATIENT)
Age: 69
End: 2024-01-05

## 2024-01-08 ENCOUNTER — RX RENEWAL (OUTPATIENT)
Age: 69
End: 2024-01-08

## 2024-01-08 RX ORDER — BLOOD SUGAR DIAGNOSTIC
STRIP MISCELLANEOUS
Qty: 300 | Refills: 0 | Status: ACTIVE | COMMUNITY
Start: 2017-10-30 | End: 1900-01-01

## 2024-01-18 ENCOUNTER — TRANSCRIPTION ENCOUNTER (OUTPATIENT)
Age: 69
End: 2024-01-18

## 2024-01-26 ENCOUNTER — RX RENEWAL (OUTPATIENT)
Age: 69
End: 2024-01-26

## 2024-01-29 ENCOUNTER — RX RENEWAL (OUTPATIENT)
Age: 69
End: 2024-01-29

## 2024-03-22 ENCOUNTER — APPOINTMENT (OUTPATIENT)
Dept: ENDOCRINOLOGY | Facility: CLINIC | Age: 69
End: 2024-03-22
Payer: MEDICARE

## 2024-03-22 VITALS
BODY MASS INDEX: 46.93 KG/M2 | OXYGEN SATURATION: 97 % | HEIGHT: 67 IN | SYSTOLIC BLOOD PRESSURE: 118 MMHG | DIASTOLIC BLOOD PRESSURE: 71 MMHG | WEIGHT: 299 LBS | HEART RATE: 94 BPM

## 2024-03-22 VITALS — SYSTOLIC BLOOD PRESSURE: 134 MMHG | DIASTOLIC BLOOD PRESSURE: 85 MMHG

## 2024-03-22 LAB — HBA1C MFR BLD HPLC: 6

## 2024-03-22 PROCEDURE — G2211 COMPLEX E/M VISIT ADD ON: CPT

## 2024-03-22 PROCEDURE — 83036 HEMOGLOBIN GLYCOSYLATED A1C: CPT | Mod: QW

## 2024-03-22 PROCEDURE — 36415 COLL VENOUS BLD VENIPUNCTURE: CPT

## 2024-03-22 PROCEDURE — 99214 OFFICE O/P EST MOD 30 MIN: CPT

## 2024-03-22 NOTE — HISTORY OF PRESENT ILLNESS
[FreeTextEntry1] : Patient is a 69 yo man with type 2 diabetes, BMI 45, HTN, GLADYS here for diabetes follow up  Type 2 diabetes diagnosed about 2010 based on symptoms of urinary frequency. He was started on insulin right away. Currently on levemir 24 units at bedtime, metformin 1000 mg BID and weekly ozempic 0.5. 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  He discontinue novolog 3 months ago Glucose levels range from 80-130s.  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

## 2024-03-22 NOTE — REVIEW OF SYSTEMS
[Chest Pain] : no chest pain [Palpitations] : no palpitations [Constipation] : no constipation [Nausea] : no nausea [Abdominal Pain] : no abdominal pain

## 2024-03-22 NOTE — PHYSICAL EXAM
[Alert] : alert [Well Nourished] : well nourished [No Acute Distress] : no acute distress [EOMI] : extra ocular movement intact [Normal Hearing] : hearing was normal [No Respiratory Distress] : no respiratory distress [No Accessory Muscle Use] : no accessory muscle use [Clear to Auscultation] : lungs were clear to auscultation bilaterally [Normal S1, S2] : normal S1 and S2 [Normal Rate] : heart rate was normal [Normal Bowel Sounds] : normal bowel sounds [Soft] : abdomen soft [Left foot was examined, including] : left foot ~C was examined, including visual inspection with sensory and pulse exams [Normal Affect] : the affect was normal [Normal Insight/Judgement] : insight and judgment were intact [Normal Mood] : the mood was normal [de-identified] : BMI 47 [de-identified] : no left foot ulcer [de-identified] : pannus and hernia

## 2024-03-22 NOTE — ASSESSMENT
[Diabetes Foot Care] : diabetes foot care [Hypoglycemia Management] : hypoglycemia management [Importance of Diet and Exercise] : importance of diet and exercise to improve glycemic control, achieve weight loss and improve cardiovascular health [Injection Technique, Storage, Sharps Disposal] : injection technique, storage, and sharps disposal [Action and use of Insulin] : action and use of short and long-acting insulin [Retinopathy Screening] : Patient was referred to ophthalmology for retinopathy screening [Weight Loss] : weight loss [Diabetic Medications] : Risks and benefits of diabetic medications were discussed [FreeTextEntry1] : Patient is a 69 yo man with class III obesity, type 2 diabetes and HTN here for follow up  1. T2DM -patient's POCT A1c today is perfect at 6% and he is at goal -continue levemir to 24 at bedtime, metformin 1000 mg BID and ozempic 0.5 mg weekly -diet remains somewhat liberal. 80% of foods are purchased or premade -needs podiatry exam; no ulcers on left foot -up to date with dilated eye exam -we discussed increasing ozempic. Educated that his history of pancreatitis puts him at risk.  There have not been significant studies regarding GLP 1 agonist and pancreatitis-no absolute contraindication but high caution -check nephropathy screen today  2. Class III obesity -since starting Ozempic, he has had significant weight loss. BMI is lower. It's important to note also that bolus insulin was discontinued and insulin is an anabolic agent -he is without reported side effects -we have had discussions about potential risks of pancreatitis. He is aware. Denies hx of MEN/MTC.  Return in 5 months.

## 2024-03-25 LAB
CREAT SPEC-SCNC: 192 MG/DL
MICROALBUMIN 24H UR DL<=1MG/L-MCNC: 4.2 MG/DL
MICROALBUMIN/CREAT 24H UR-RTO: 22 MG/G

## 2024-04-18 ENCOUNTER — NON-APPOINTMENT (OUTPATIENT)
Age: 69
End: 2024-04-18

## 2024-04-18 ENCOUNTER — APPOINTMENT (OUTPATIENT)
Dept: OPHTHALMOLOGY | Facility: CLINIC | Age: 69
End: 2024-04-18
Payer: MEDICARE

## 2024-04-18 PROCEDURE — 92133 CPTRZD OPH DX IMG PST SGM ON: CPT

## 2024-04-18 PROCEDURE — 92014 COMPRE OPH EXAM EST PT 1/>: CPT

## 2024-04-19 PROBLEM — H40.9 UNSPECIFIED GLAUCOMA: Chronic | Status: ACTIVE | Noted: 2020-11-18

## 2024-04-24 ENCOUNTER — RX RENEWAL (OUTPATIENT)
Age: 69
End: 2024-04-24

## 2024-04-24 RX ORDER — ATORVASTATIN CALCIUM 40 MG/1
40 TABLET, FILM COATED ORAL
Qty: 90 | Refills: 1 | Status: ACTIVE | COMMUNITY
Start: 2017-10-17 | End: 1900-01-01

## 2024-04-24 NOTE — PROGRESS NOTE ADULT - PROBLEM/PLAN-8
----- Message from Karlie Hart MD sent at 4/24/2024 12:13 PM EDT -----  Blood work is normal.   
I called the patient and left detailed voicemail with his results.    
DISPLAY PLAN FREE TEXT

## 2024-04-26 ENCOUNTER — RX RENEWAL (OUTPATIENT)
Age: 69
End: 2024-04-26

## 2024-05-29 ENCOUNTER — APPOINTMENT (OUTPATIENT)
Dept: OPHTHALMOLOGY | Facility: CLINIC | Age: 69
End: 2024-05-29
Payer: MEDICARE

## 2024-05-29 ENCOUNTER — NON-APPOINTMENT (OUTPATIENT)
Age: 69
End: 2024-05-29

## 2024-05-29 PROCEDURE — 92250 FUNDUS PHOTOGRAPHY W/I&R: CPT

## 2024-05-29 PROCEDURE — 92136 OPHTHALMIC BIOMETRY: CPT

## 2024-05-29 PROCEDURE — 92025 CPTRIZED CORNEAL TOPOGRAPHY: CPT

## 2024-05-29 PROCEDURE — 92012 INTRM OPH EXAM EST PATIENT: CPT

## 2024-06-04 ENCOUNTER — APPOINTMENT (OUTPATIENT)
Dept: DERMATOLOGY | Facility: CLINIC | Age: 69
End: 2024-06-04
Payer: MEDICARE

## 2024-06-04 ENCOUNTER — TRANSCRIPTION ENCOUNTER (OUTPATIENT)
Age: 69
End: 2024-06-04

## 2024-06-04 DIAGNOSIS — D49.2 NEOPLASM OF UNSPECIFIED BEHAVIOR OF BONE, SOFT TISSUE, AND SKIN: ICD-10-CM

## 2024-06-04 DIAGNOSIS — D22.9 MELANOCYTIC NEVI, UNSPECIFIED: ICD-10-CM

## 2024-06-04 DIAGNOSIS — L82.1 OTHER SEBORRHEIC KERATOSIS: ICD-10-CM

## 2024-06-04 DIAGNOSIS — Z12.83 ENCOUNTER FOR SCREENING FOR MALIGNANT NEOPLASM OF SKIN: ICD-10-CM

## 2024-06-04 DIAGNOSIS — L30.4 ERYTHEMA INTERTRIGO: ICD-10-CM

## 2024-06-04 DIAGNOSIS — Z85.820 PERSONAL HISTORY OF MALIGNANT MELANOMA OF SKIN: ICD-10-CM

## 2024-06-04 PROCEDURE — 99214 OFFICE O/P EST MOD 30 MIN: CPT

## 2024-06-04 RX ORDER — KETOCONAZOLE 20 MG/G
2 CREAM TOPICAL
Qty: 1 | Refills: 3 | Status: ACTIVE | COMMUNITY
Start: 2024-06-04 | End: 1900-01-01

## 2024-06-04 NOTE — HISTORY OF PRESENT ILLNESS
[FreeTextEntry1] : RPV- FBSE [de-identified] : George Nur is a 69 y/o male patient who is follow up.  - s/p Mohs for MIS on R temple  - Has not yet seem hand surgeon for right thumb lesion. Does not have any contacts, forgot he was given that during last appt.  - on/off scrotal itching. Not active. Was concerned about possible skin thinning , so did not start rx HCN cream that was prescribed last visit.  -Itching and irritation under right inframammary fold.  - again, notes severe ventral hernia with failed prior surgery. Wears abdominal binder infrequently because it does not fit well and falls off.  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   SH: speaks Mandarin Chinese, lived in [facility]

## 2024-06-04 NOTE — PHYSICAL EXAM
[Alert] : alert [Oriented x 3] : ~L oriented x 3 [Well Nourished] : well nourished [Conjunctiva Non-injected] : conjunctiva non-injected [No Visual Lymphadenopathy] : no visual  lymphadenopathy [No Clubbing] : no clubbing [No Edema] : no edema [No Bromhidrosis] : no bromhidrosis [No Chromhidrosis] : no chromhidrosis [Full Body Skin Exam Performed] : performed [FreeTextEntry3] : -right temple: well healed linear scar -right lower lateral back: well healed linear scar - Right thumb PIP joint: soft subcutaneous nodule - inframammary fold- intertriginous erythematous plaques. negative woods lamp  - Severely distended non painful hernia on left lower abdomen with disfiguration of anatomy. On the underside of the hernia, there is skin thickening, cobblestoning and verrucous skin changes. No ulcers or erythema.

## 2024-06-04 NOTE — ASSESSMENT
[FreeTextEntry1] : #Skin cancer screening  Sun protection reviewed. The patient was educated regarding appropriate sun protection measures, including wearing sunscreen with SPF 30 or higher when outdoors, sun protective clothing, and sun avoidance.   #Benign appearing nevi #Seborrheic keratosis Patient was reassured of the benign nature of these findings. No further treatment needed at this time.  # Intertrigo Diagnosis/Cause:  Intertrigo is a common rash that often occurs in skin folds.  It is due to chronic moisture and friction, and sometimes superficial infection with fungal organisms and/or bacteria can make it worse.  Treatment is aimed at reducing the moisture or friction, and sometimes topical medicated creams or ointments are helpful.    -  Apply 1:1 hydrocortisone and ketoconazole cream BID for 2 weeks on 1 week off prn.  - Zeabsorb AF daily for maintenance  #MIS- R temple s/p MMS, treated  #Atypical poroma S/P WLE, treated  #. Right thumb nodule suspect ganglion vs. mucous cyst -referral to hand surgery   4. 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 Scrotal itching -OK to start hydrocortisone 2.5% cream BID PRN for less than 2 weeks in 1 month. Discussed potential risk including skin thinning and atrophy, discussed that risk is low with appropriate use.   6. Lymphedema/elephantiasis nostra verrucosa 7. ventral hernia s/p failed repair He has chronic severe ventral hernia and presents with lymphedema and ENV. Suspect this is secondary to lymphatic insufficiency from hernia, body habitus, and prior abdominal surgeries. Reviewed chronic nature of this condition and challenging treatment.  -advised him to wear abdominal binders as much as possible for compression   -gentle moisturizer such as vanicream daily.  -Educated him for signs of infection including acute new and worsening redness/pain/swelling or new ulcerations on skin.   -he is currently not candidate for further hernia repair surgery or bariatric surgery but he is currently actively trying to lose weight in order to qualify. -given presence of lymphedema, will refer him to vascular surgery for evaluation and for any additional recommendations that can help improve his QoL.  RTC 6 months or sooner prn

## 2024-06-10 ENCOUNTER — APPOINTMENT (OUTPATIENT)
Dept: INTERNAL MEDICINE | Facility: CLINIC | Age: 69
End: 2024-06-10
Payer: MEDICARE

## 2024-06-10 ENCOUNTER — RX RENEWAL (OUTPATIENT)
Age: 69
End: 2024-06-10

## 2024-06-10 ENCOUNTER — TRANSCRIPTION ENCOUNTER (OUTPATIENT)
Age: 69
End: 2024-06-10

## 2024-06-10 VITALS
SYSTOLIC BLOOD PRESSURE: 134 MMHG | HEIGHT: 68 IN | HEART RATE: 80 BPM | OXYGEN SATURATION: 97 % | DIASTOLIC BLOOD PRESSURE: 84 MMHG | TEMPERATURE: 98.2 F | WEIGHT: 300 LBS | RESPIRATION RATE: 16 BRPM | BODY MASS INDEX: 45.47 KG/M2

## 2024-06-10 DIAGNOSIS — R06.09 OTHER FORMS OF DYSPNEA: ICD-10-CM

## 2024-06-10 DIAGNOSIS — H26.9 UNSPECIFIED CATARACT: ICD-10-CM

## 2024-06-10 DIAGNOSIS — E78.5 HYPERLIPIDEMIA, UNSPECIFIED: ICD-10-CM

## 2024-06-10 DIAGNOSIS — J45.50 SEVERE PERSISTENT ASTHMA, UNCOMPLICATED: ICD-10-CM

## 2024-06-10 DIAGNOSIS — E11.9 TYPE 2 DIABETES MELLITUS W/OUT COMPLICATIONS: ICD-10-CM

## 2024-06-10 DIAGNOSIS — I10 ESSENTIAL (PRIMARY) HYPERTENSION: ICD-10-CM

## 2024-06-10 PROCEDURE — G2211 COMPLEX E/M VISIT ADD ON: CPT

## 2024-06-10 PROCEDURE — 36415 COLL VENOUS BLD VENIPUNCTURE: CPT

## 2024-06-10 PROCEDURE — 99213 OFFICE O/P EST LOW 20 MIN: CPT | Mod: GC

## 2024-06-10 PROCEDURE — 93000 ELECTROCARDIOGRAM COMPLETE: CPT

## 2024-06-10 RX ORDER — INSULIN ASPART 100 [IU]/ML
100 INJECTION, SOLUTION INTRAVENOUS; SUBCUTANEOUS
Qty: 100 | Refills: 1 | Status: DISCONTINUED | COMMUNITY
Start: 2020-07-20 | End: 2024-06-10

## 2024-06-10 RX ORDER — LISINOPRIL 40 MG/1
40 TABLET ORAL DAILY
Qty: 30 | Refills: 3 | Status: ACTIVE | COMMUNITY
Start: 2023-10-16 | End: 1900-01-01

## 2024-06-12 ENCOUNTER — TRANSCRIPTION ENCOUNTER (OUTPATIENT)
Age: 69
End: 2024-06-12

## 2024-06-12 LAB
ANION GAP SERPL CALC-SCNC: 12 MMOL/L
BUN SERPL-MCNC: 23 MG/DL
CALCIUM SERPL-MCNC: 9 MG/DL
CHLORIDE SERPL-SCNC: 104 MMOL/L
CO2 SERPL-SCNC: 24 MMOL/L
CREAT SERPL-MCNC: 1.39 MG/DL
EGFR: 55 ML/MIN/1.73M2
GLUCOSE SERPL-MCNC: 93 MG/DL
HCT VFR BLD CALC: 41.4 %
HGB BLD-MCNC: 13.2 G/DL
MCHC RBC-ENTMCNC: 29.7 PG
MCHC RBC-ENTMCNC: 31.9 GM/DL
MCV RBC AUTO: 93 FL
PLATELET # BLD AUTO: 294 K/UL
POTASSIUM SERPL-SCNC: 4.9 MMOL/L
RBC # BLD: 4.45 M/UL
RBC # FLD: 14 %
SODIUM SERPL-SCNC: 141 MMOL/L
WBC # FLD AUTO: 6.93 K/UL

## 2024-06-17 ENCOUNTER — TRANSCRIPTION ENCOUNTER (OUTPATIENT)
Age: 69
End: 2024-06-17

## 2024-06-24 ENCOUNTER — APPOINTMENT (OUTPATIENT)
Dept: INTERNAL MEDICINE | Facility: CLINIC | Age: 69
End: 2024-06-24
Payer: MEDICARE

## 2024-06-24 VITALS
OXYGEN SATURATION: 95 % | SYSTOLIC BLOOD PRESSURE: 120 MMHG | HEART RATE: 99 BPM | HEIGHT: 68 IN | WEIGHT: 300 LBS | DIASTOLIC BLOOD PRESSURE: 83 MMHG | BODY MASS INDEX: 45.47 KG/M2 | TEMPERATURE: 97.3 F

## 2024-06-24 DIAGNOSIS — R53.83 OTHER FATIGUE: ICD-10-CM

## 2024-06-24 DIAGNOSIS — R79.89 OTHER SPECIFIED ABNORMAL FINDINGS OF BLOOD CHEMISTRY: ICD-10-CM

## 2024-06-24 PROCEDURE — G2211 COMPLEX E/M VISIT ADD ON: CPT

## 2024-06-24 PROCEDURE — 36415 COLL VENOUS BLD VENIPUNCTURE: CPT

## 2024-06-24 PROCEDURE — 99213 OFFICE O/P EST LOW 20 MIN: CPT

## 2024-06-24 NOTE — ASSESSMENT
[FreeTextEntry4] : 68 y/o M w/ PMGX of Asthma, depression, DM, HLD, HTN, aortic sclerosis, and vitamin D deficiency presents for pre-op clearance for cataract surgery on 07/01/2024.  #Pre-op clearance - RCRI 1 point indicating class II risk  - METS 7  - Low risk patient for a low risk procedure  - EKG 80 NSR w/ LBBB seen on previous EKGs.  - Recommended to decrease lantus from 24u to 20u the night before his surgery - Hold metformin for 1 day. - Hold Ozempic up to 1 week prior to surgery  - Can take lisinopril and duloxetine durign the day of surgery but after his procedure. Recommending to hold his other drugs for 1 day after his surgery.  - Clearance is pending BMP and CBC. - Fax clearance to Dr. Akira Mina office [phone]

## 2024-06-24 NOTE — HISTORY OF PRESENT ILLNESS
[Asthma] : asthma [No Adverse Anesthesia Reaction] : no adverse anesthesia reaction in self or family member [Good (7-10 METs)] : Good (7-10 METs) [Aortic Stenosis] : no aortic stenosis [Atrial Fibrillation] : no atrial fibrillation [Coronary Artery Disease] : no coronary artery disease [Recent Myocardial Infarction] : no recent myocardial infarction [Implantable Device/Pacemaker] : no implantable device/pacemaker [COPD] : no COPD [Sleep Apnea] : no sleep apnea [Smoker] : not a smoker [FreeTextEntry4] : 70 y/o M w/ PMhx of Asthma, depression DM, HLD, HTN, GLADYS, aortic sclerosis, and vitamin D deficiency presents for pre-op clearance for cataract surgery. Patient has cataracts in both eyes and is scheduled to get one eye corrected on 07/01/24 (does not know which eye). OPH MD is Akira pablo [phone]). Patient states he can go up 1 flight of stairs before getting tired. States he there are days he can walk multiple blocks without stopping and feels well, and days that he would walk his same route and get fatigued.  [FreeTextEntry6] : Reports he intermittently gets SOB when he walks

## 2024-06-24 NOTE — END OF VISIT
[] : Resident [FreeTextEntry3] : Preop for cataracts. Patient has massive inoperable L inguinal hernia. He can achieve 7 mets and RCRI score is 1 assuming creatinine is less than 2. Will repeat BMP. Will obtain CBC for patient's chronic BANKS.  Addendum: labs are fine. Patient is optimized for cararact surgery.

## 2024-06-24 NOTE — REVIEW OF SYSTEMS
[Negative] : Neurological [Wheezing] : no wheezing [Cough] : no cough [Abdominal Pain] : no abdominal pain [Nausea] : no nausea [Diarrhea] : no diarrhea [Vomiting] : no vomiting [FreeTextEntry6] : Intermittently has BANKS.  [FreeTextEntry7] : +Large abdominal hernia down to patients knees.  [de-identified] : mild erythema and chronic skin changes around the underside of the hernia.

## 2024-06-26 ENCOUNTER — NON-APPOINTMENT (OUTPATIENT)
Age: 69
End: 2024-06-26

## 2024-06-26 DIAGNOSIS — N17.9 ACUTE KIDNEY FAILURE, UNSPECIFIED: ICD-10-CM

## 2024-06-26 RX ORDER — DEXTROSE MONOHYDRATE AND SODIUM CHLORIDE 5; .3 G/100ML; G/100ML
1000 INJECTION, SOLUTION INTRAVENOUS
Refills: 0 | Status: DISCONTINUED | OUTPATIENT
Start: 2024-07-01 | End: 2024-07-01

## 2024-06-28 PROBLEM — H40.9 UNSPECIFIED GLAUCOMA: Chronic | Status: INACTIVE | Noted: 2020-11-18 | Resolved: 2024-06-28

## 2024-07-01 ENCOUNTER — OUTPATIENT (OUTPATIENT)
Dept: OUTPATIENT SERVICES | Facility: HOSPITAL | Age: 69
LOS: 1 days | Discharge: HOME | End: 2024-07-01
Payer: MEDICARE

## 2024-07-01 ENCOUNTER — TRANSCRIPTION ENCOUNTER (OUTPATIENT)
Age: 69
End: 2024-07-01

## 2024-07-01 ENCOUNTER — APPOINTMENT (OUTPATIENT)
Dept: OPHTHALMOLOGY | Facility: AMBULATORY SURGERY CENTER | Age: 69
End: 2024-07-01

## 2024-07-01 VITALS
HEART RATE: 83 BPM | DIASTOLIC BLOOD PRESSURE: 62 MMHG | OXYGEN SATURATION: 96 % | TEMPERATURE: 97 F | SYSTOLIC BLOOD PRESSURE: 131 MMHG | RESPIRATION RATE: 16 BRPM

## 2024-07-01 VITALS
WEIGHT: 292.77 LBS | RESPIRATION RATE: 16 BRPM | HEIGHT: 68 IN | HEART RATE: 91 BPM | DIASTOLIC BLOOD PRESSURE: 77 MMHG | OXYGEN SATURATION: 97 % | TEMPERATURE: 98 F | SYSTOLIC BLOOD PRESSURE: 122 MMHG

## 2024-07-01 DIAGNOSIS — Z98.890 OTHER SPECIFIED POSTPROCEDURAL STATES: Chronic | ICD-10-CM

## 2024-07-01 DIAGNOSIS — Z90.49 ACQUIRED ABSENCE OF OTHER SPECIFIED PARTS OF DIGESTIVE TRACT: Chronic | ICD-10-CM

## 2024-07-01 LAB
ANION GAP SERPL CALC-SCNC: 13 MMOL/L
BUN SERPL-MCNC: 24 MG/DL
CALCIUM SERPL-MCNC: 9.6 MG/DL
CHLORIDE SERPL-SCNC: 103 MMOL/L
CO2 SERPL-SCNC: 25 MMOL/L
CREAT SERPL-MCNC: 1.43 MG/DL
CYSTATIN C SERPL-MCNC: 1.54 MG/L
EGFR: 53 ML/MIN/1.73M2
GFR/BSA.PRED SERPLBLD CYS-BASED-ARV: 42 ML/MIN/1.73M2
GLUCOSE BLDC GLUCOMTR-MCNC: 102 MG/DL — HIGH (ref 70–99)
GLUCOSE BLDC GLUCOMTR-MCNC: 96 MG/DL — SIGNIFICANT CHANGE UP (ref 70–99)
GLUCOSE BLDC GLUCOMTR-MCNC: 99 MG/DL — SIGNIFICANT CHANGE UP (ref 70–99)
GLUCOSE SERPL-MCNC: 100 MG/DL
POTASSIUM SERPL-SCNC: 5.3 MMOL/L
SODIUM SERPL-SCNC: 141 MMOL/L
TSH SERPL-ACNC: 1.69 UIU/ML

## 2024-07-01 PROCEDURE — 66984 XCAPSL CTRC RMVL W/O ECP: CPT | Mod: RT

## 2024-07-01 DEVICE — LENS IOL TECNIS EYHANCE DIB00 17.5D
Type: IMPLANTABLE DEVICE | Site: RIGHT | Status: NON-FUNCTIONAL
Removed: 2024-07-01

## 2024-07-01 RX ORDER — BENZOYL PEROXIDE MICRONIZED 5.8 %
0 TOWELETTE (EA) TOPICAL
Refills: 0 | DISCHARGE

## 2024-07-01 RX ORDER — ATORVASTATIN CALCIUM 80 MG/1
1 TABLET, FILM COATED ORAL
Refills: 0 | DISCHARGE

## 2024-07-01 RX ORDER — BUPROPION HYDROCHLORIDE 150 MG/1
1 TABLET, EXTENDED RELEASE ORAL
Refills: 0 | DISCHARGE

## 2024-07-01 RX ORDER — TETRACAINE HCL 0.5 %
1 DROPS OPHTHALMIC (EYE) ONCE
Refills: 0 | Status: COMPLETED | OUTPATIENT
Start: 2024-07-01 | End: 2024-07-01

## 2024-07-01 RX ORDER — FLUTICASONE PROPIONATE 220 MCG
0 AEROSOL WITH ADAPTER (GRAM) INHALATION
Refills: 0 | DISCHARGE

## 2024-07-01 RX ORDER — INSULIN DETEMIR 100/ML (3)
33 INSULIN PEN (ML) SUBCUTANEOUS
Refills: 0 | DISCHARGE

## 2024-07-01 RX ORDER — DULOXETINE HYDROCHLORIDE 30 MG/1
1 CAPSULE, DELAYED RELEASE ORAL
Refills: 0 | DISCHARGE

## 2024-07-01 RX ORDER — TROPICAMIDE 0.5 %
1 DROPS OPHTHALMIC (EYE)
Refills: 0 | Status: COMPLETED | OUTPATIENT
Start: 2024-07-01 | End: 2024-07-01

## 2024-07-01 RX ORDER — CYCLOPENTOLATE HYDROCHLORIDE 20 MG/ML
1 SOLUTION/ DROPS OPHTHALMIC
Refills: 0 | Status: COMPLETED | OUTPATIENT
Start: 2024-07-01 | End: 2024-07-01

## 2024-07-01 RX ORDER — METFORMIN HYDROCHLORIDE 850 MG/1
1 TABLET ORAL
Refills: 0 | DISCHARGE

## 2024-07-01 RX ORDER — LISINOPRIL 5 MG/1
1 TABLET ORAL
Refills: 0 | DISCHARGE

## 2024-07-01 RX ORDER — OFLOXACIN 3 MG/ML
1 SOLUTION/ DROPS OPHTHALMIC
Refills: 0 | Status: COMPLETED | OUTPATIENT
Start: 2024-07-01 | End: 2024-07-01

## 2024-07-01 RX ORDER — CHOLECALCIFEROL (VITAMIN D3) 125 MCG
1 CAPSULE ORAL
Refills: 0 | DISCHARGE

## 2024-07-01 RX ADMIN — Medication 1 DROP(S): at 12:33

## 2024-07-01 RX ADMIN — Medication 1 DROP(S): at 12:40

## 2024-07-01 RX ADMIN — OFLOXACIN 1 DROP(S): 3 SOLUTION/ DROPS OPHTHALMIC at 12:27

## 2024-07-01 RX ADMIN — CYCLOPENTOLATE HYDROCHLORIDE 1 DROP(S): 20 SOLUTION/ DROPS OPHTHALMIC at 12:40

## 2024-07-01 RX ADMIN — OFLOXACIN 1 DROP(S): 3 SOLUTION/ DROPS OPHTHALMIC at 12:40

## 2024-07-01 RX ADMIN — DEXTROSE MONOHYDRATE AND SODIUM CHLORIDE 40 MILLILITER(S): 5; .3 INJECTION, SOLUTION INTRAVENOUS at 16:30

## 2024-07-01 RX ADMIN — CYCLOPENTOLATE HYDROCHLORIDE 1 DROP(S): 20 SOLUTION/ DROPS OPHTHALMIC at 12:33

## 2024-07-01 RX ADMIN — Medication 1 DROP(S): at 12:27

## 2024-07-01 RX ADMIN — OFLOXACIN 1 DROP(S): 3 SOLUTION/ DROPS OPHTHALMIC at 12:34

## 2024-07-01 RX ADMIN — CYCLOPENTOLATE HYDROCHLORIDE 1 DROP(S): 20 SOLUTION/ DROPS OPHTHALMIC at 12:27

## 2024-07-01 RX ADMIN — Medication 1 DROP(S): at 12:26

## 2024-07-02 ENCOUNTER — NON-APPOINTMENT (OUTPATIENT)
Age: 69
End: 2024-07-02

## 2024-07-02 ENCOUNTER — APPOINTMENT (OUTPATIENT)
Dept: OPHTHALMOLOGY | Facility: CLINIC | Age: 69
End: 2024-07-02
Payer: MEDICARE

## 2024-07-02 PROCEDURE — 99024 POSTOP FOLLOW-UP VISIT: CPT

## 2024-07-05 PROBLEM — R01.1 CARDIAC MURMUR, UNSPECIFIED: Chronic | Status: ACTIVE | Noted: 2024-07-01

## 2024-07-05 PROBLEM — K51.90 ULCERATIVE COLITIS, UNSPECIFIED, WITHOUT COMPLICATIONS: Chronic | Status: ACTIVE | Noted: 2024-07-01

## 2024-07-05 PROBLEM — I35.8 OTHER NONRHEUMATIC AORTIC VALVE DISORDERS: Chronic | Status: ACTIVE | Noted: 2024-07-01

## 2024-07-05 PROBLEM — L30.4 ERYTHEMA INTERTRIGO: Chronic | Status: ACTIVE | Noted: 2024-07-01

## 2024-07-05 PROBLEM — I89.0 LYMPHEDEMA, NOT ELSEWHERE CLASSIFIED: Chronic | Status: ACTIVE | Noted: 2024-07-01

## 2024-07-05 PROBLEM — Z86.59 PERSONAL HISTORY OF OTHER MENTAL AND BEHAVIORAL DISORDERS: Chronic | Status: ACTIVE | Noted: 2024-07-01

## 2024-07-08 ENCOUNTER — NON-APPOINTMENT (OUTPATIENT)
Age: 69
End: 2024-07-08

## 2024-07-08 ENCOUNTER — TRANSCRIPTION ENCOUNTER (OUTPATIENT)
Age: 69
End: 2024-07-08

## 2024-07-08 ENCOUNTER — APPOINTMENT (OUTPATIENT)
Dept: OPHTHALMOLOGY | Facility: CLINIC | Age: 69
End: 2024-07-08
Payer: MEDICARE

## 2024-07-08 PROCEDURE — 99024 POSTOP FOLLOW-UP VISIT: CPT

## 2024-07-09 ENCOUNTER — TRANSCRIPTION ENCOUNTER (OUTPATIENT)
Age: 69
End: 2024-07-09

## 2024-07-09 RX ORDER — INSULIN GLARGINE 100 [IU]/ML
100 INJECTION, SOLUTION SUBCUTANEOUS
Qty: 4 | Refills: 3 | Status: ACTIVE | COMMUNITY
Start: 2024-07-09 | End: 1900-01-01

## 2024-07-10 ENCOUNTER — OUTPATIENT (OUTPATIENT)
Dept: OUTPATIENT SERVICES | Facility: HOSPITAL | Age: 69
LOS: 1 days | End: 2024-07-10
Payer: COMMERCIAL

## 2024-07-10 ENCOUNTER — RESULT REVIEW (OUTPATIENT)
Age: 69
End: 2024-07-10

## 2024-07-10 ENCOUNTER — APPOINTMENT (OUTPATIENT)
Dept: ULTRASOUND IMAGING | Facility: HOSPITAL | Age: 69
End: 2024-07-10
Payer: MEDICARE

## 2024-07-10 DIAGNOSIS — Z90.49 ACQUIRED ABSENCE OF OTHER SPECIFIED PARTS OF DIGESTIVE TRACT: Chronic | ICD-10-CM

## 2024-07-10 DIAGNOSIS — Z98.890 OTHER SPECIFIED POSTPROCEDURAL STATES: Chronic | ICD-10-CM

## 2024-07-10 PROCEDURE — 76770 US EXAM ABDO BACK WALL COMP: CPT | Mod: 26

## 2024-07-10 PROCEDURE — 76770 US EXAM ABDO BACK WALL COMP: CPT

## 2024-07-11 ENCOUNTER — TRANSCRIPTION ENCOUNTER (OUTPATIENT)
Age: 69
End: 2024-07-11

## 2024-07-11 ENCOUNTER — NON-APPOINTMENT (OUTPATIENT)
Age: 69
End: 2024-07-11

## 2024-07-16 ENCOUNTER — TRANSCRIPTION ENCOUNTER (OUTPATIENT)
Age: 69
End: 2024-07-16

## 2024-07-23 ENCOUNTER — TRANSCRIPTION ENCOUNTER (OUTPATIENT)
Age: 69
End: 2024-07-23

## 2024-07-31 ENCOUNTER — APPOINTMENT (OUTPATIENT)
Dept: NEPHROLOGY | Facility: CLINIC | Age: 69
End: 2024-07-31
Payer: MEDICARE

## 2024-07-31 VITALS
DIASTOLIC BLOOD PRESSURE: 74 MMHG | WEIGHT: 300 LBS | HEIGHT: 68 IN | SYSTOLIC BLOOD PRESSURE: 118 MMHG | BODY MASS INDEX: 45.47 KG/M2

## 2024-07-31 DIAGNOSIS — E11.9 TYPE 2 DIABETES MELLITUS W/OUT COMPLICATIONS: ICD-10-CM

## 2024-07-31 DIAGNOSIS — I10 ESSENTIAL (PRIMARY) HYPERTENSION: ICD-10-CM

## 2024-07-31 DIAGNOSIS — N17.9 ACUTE KIDNEY FAILURE, UNSPECIFIED: ICD-10-CM

## 2024-07-31 DIAGNOSIS — E87.5 HYPERKALEMIA: ICD-10-CM

## 2024-07-31 DIAGNOSIS — N18.31 CHRONIC KIDNEY DISEASE, STAGE 3A: ICD-10-CM

## 2024-07-31 PROCEDURE — 99204 OFFICE O/P NEW MOD 45 MIN: CPT

## 2024-07-31 PROCEDURE — G2211 COMPLEX E/M VISIT ADD ON: CPT

## 2024-07-31 NOTE — PHYSICAL EXAM
[General Appearance - Alert] : alert [General Appearance - In No Acute Distress] : in no acute distress [Sclera] : the sclera and conjunctiva were normal [Outer Ear] : the ears and nose were normal in appearance [Neck Appearance] : the appearance of the neck was normal [Neck Cervical Mass (___cm)] : no neck mass was observed [Jugular Venous Distention Increased] : there was no jugular-venous distention [Auscultation Breath Sounds / Voice Sounds] : lungs were clear to auscultation bilaterally [Heart Rate And Rhythm] : heart rate was normal and rhythm regular [Heart Sounds] : normal S1 and S2 [Heart Sounds Gallop] : no gallops [Murmurs] : no murmurs [Heart Sounds Pericardial Friction Rub] : no pericardial rub [Skin Color & Pigmentation] : normal skin color and pigmentation [Skin Turgor] : normal skin turgor [] : no rash [No Focal Deficits] : no focal deficits [Oriented To Time, Place, And Person] : oriented to person, place, and time [Impaired Insight] : insight and judgment were intact [Affect] : the affect was normal

## 2024-07-31 NOTE — HISTORY OF PRESENT ILLNESS
[FreeTextEntry1] : 69-year-old man with a history of hypertension, type 2 diabetes, GLADYS, asthma, depression.  His creatinine 1 month ago was 1.43 with a BUN of 24, K5.3, CO2 25, GFR 53, but only 42 by Cystatin C , TSH of 1.7, hemoglobin 13.2.  He uses CPAP but finds that he used to be able to walk 20-30 blocks and can now only do 2-3 blocks.  He dozes off in the middle of the day, suggesting that perhaps CPAP is not working as well as desire.  He has lost 60 pounds in the last 2 years on Ozempic.  He has nocturia x 1 with a stream that is still okay and has never been told he has BPH.  He had cataract surgery on the right eye 1 month ago and will have the left done in September 12.  An ultrasound of the kidneys was normal 3 weeks ago.  His BP has been well-controlled on lisinopril 40 mg daily.  He is on metformin 1 g twice daily.  He is on atorvastatin 40 mg daily.  He has not used NSAIDs.  His urine does not look foamy or bubbly.  There is no history of retinopathy or neuropathy.

## 2024-07-31 NOTE — ASSESSMENT
[FreeTextEntry1] : 69-year-old man with a 12-year history of type 2 diabetes, 20 to 30-year history of hypertension, who presents with stage III CKD.  I do not know yet whether he has albuminuria.  His kidneys look normal on ultrasound.  I have ordered labs to include UACR, BMP, Cystatin C, PTH, urinalysis.  He will return in 4 months.

## 2024-08-01 LAB
ANION GAP SERPL CALC-SCNC: 17 MMOL/L
BUN SERPL-MCNC: 24 MG/DL
CALCIUM SERPL-MCNC: 9.2 MG/DL
CALCIUM SERPL-MCNC: 9.2 MG/DL
CHLORIDE SERPL-SCNC: 102 MMOL/L
CO2 SERPL-SCNC: 22 MMOL/L
CREAT SERPL-MCNC: 1.48 MG/DL
CREAT SPEC-SCNC: 158 MG/DL
CYSTATIN C SERPL-MCNC: 1.56 MG/L
EGFR: 51 ML/MIN/1.73M2
ESTIMATED AVERAGE GLUCOSE: 120 MG/DL
GFR/BSA.PRED SERPLBLD CYS-BASED-ARV: 42 ML/MIN/1.73M2
GLUCOSE SERPL-MCNC: 92 MG/DL
HBA1C MFR BLD HPLC: 5.8 %
MICROALBUMIN 24H UR DL<=1MG/L-MCNC: 1.6 MG/DL
MICROALBUMIN/CREAT 24H UR-RTO: 10 MG/G
PARATHYROID HORMONE INTACT: 48 PG/ML
POTASSIUM SERPL-SCNC: 4.8 MMOL/L
SODIUM SERPL-SCNC: 141 MMOL/L

## 2024-08-05 LAB
APPEARANCE: CLEAR
BACTERIA: NEGATIVE /HPF
BILIRUBIN URINE: NEGATIVE
BLOOD URINE: NEGATIVE
CAST: 0 /LPF
COLOR: YELLOW
EPITHELIAL CELLS: 0 /HPF
GLUCOSE QUALITATIVE U: NEGATIVE MG/DL
KETONES URINE: ABNORMAL MG/DL
LEUKOCYTE ESTERASE URINE: NEGATIVE
MICROSCOPIC-UA: NORMAL
NITRITE URINE: NEGATIVE
PH URINE: 6
PROTEIN URINE: NEGATIVE MG/DL
RED BLOOD CELLS URINE: 1 /HPF
SPECIFIC GRAVITY URINE: 1.02
UROBILINOGEN URINE: 1 MG/DL
WHITE BLOOD CELLS URINE: 0 /HPF

## 2024-08-12 ENCOUNTER — APPOINTMENT (OUTPATIENT)
Dept: OPHTHALMOLOGY | Facility: CLINIC | Age: 69
End: 2024-08-12
Payer: MEDICARE

## 2024-08-12 ENCOUNTER — NON-APPOINTMENT (OUTPATIENT)
Age: 69
End: 2024-08-12

## 2024-08-12 PROCEDURE — 99024 POSTOP FOLLOW-UP VISIT: CPT

## 2024-08-12 PROCEDURE — 92250 FUNDUS PHOTOGRAPHY W/I&R: CPT

## 2024-08-15 ENCOUNTER — TRANSCRIPTION ENCOUNTER (OUTPATIENT)
Age: 69
End: 2024-08-15

## 2024-08-21 ENCOUNTER — APPOINTMENT (OUTPATIENT)
Dept: INTERNAL MEDICINE | Facility: CLINIC | Age: 69
End: 2024-08-21
Payer: MEDICARE

## 2024-08-21 VITALS
OXYGEN SATURATION: 97 % | BODY MASS INDEX: 45.47 KG/M2 | HEIGHT: 68 IN | WEIGHT: 300 LBS | SYSTOLIC BLOOD PRESSURE: 127 MMHG | DIASTOLIC BLOOD PRESSURE: 82 MMHG | TEMPERATURE: 98 F | HEART RATE: 88 BPM

## 2024-08-21 DIAGNOSIS — E11.9 TYPE 2 DIABETES MELLITUS W/OUT COMPLICATIONS: ICD-10-CM

## 2024-08-21 DIAGNOSIS — R06.09 OTHER FORMS OF DYSPNEA: ICD-10-CM

## 2024-08-21 DIAGNOSIS — Z01.818 ENCOUNTER FOR OTHER PREPROCEDURAL EXAMINATION: ICD-10-CM

## 2024-08-21 PROCEDURE — 93000 ELECTROCARDIOGRAM COMPLETE: CPT

## 2024-08-21 PROCEDURE — G2211 COMPLEX E/M VISIT ADD ON: CPT

## 2024-08-21 PROCEDURE — 99214 OFFICE O/P EST MOD 30 MIN: CPT | Mod: GC

## 2024-08-21 NOTE — END OF VISIT
[] : Resident [FreeTextEntry3] : #preop-  has stable sob for 8 months but greater than 4 mets. and cataract low risk surgery so can proceed. . hold [id number] week before surgery   #obesity  #sunshine- walks 2 blocks can get sob. new the past 8 months. walking up 2 fligth of stairs and then gets sob. see cards for more evaluation. get ekg. had this few years ago then lost weight got better and active again the last 8 months- maybe due to asthma and heat outside [Time Spent: ___ minutes] : I have spent [unfilled] minutes of time on the encounter which excludes teaching and separately reported services.

## 2024-08-21 NOTE — ASSESSMENT
[Patient Optimized for Surgery] : Patient optimized for surgery [No Further Testing Recommended] : no further testing recommended [FreeTextEntry4] : 69 y.o M w/ PMHx of controlled asthma, depression, DM, HLD, HTN and GLADYS on CPAP and vit D def presents for pre-op evaluation for left cataract surgery scheduled for 9/16 with Dr. Mina.

## 2024-08-21 NOTE — HISTORY OF PRESENT ILLNESS
[No Pertinent Cardiac History] : no history of aortic stenosis, atrial fibrillation, coronary artery disease, recent myocardial infarction, or implantable device/pacemaker [Asthma] : asthma [Sleep Apnea] : sleep apnea [Chronic Kidney Disease] : chronic kidney disease [Diabetes] : diabetes [(Patient denies any chest pain, claudication, dyspnea on exertion, orthopnea, palpitations or syncope)] : Patient denies any chest pain, claudication, dyspnea on exertion, orthopnea, palpitations or syncope [Moderate (4-6 METs)] : Moderate (4-6 METs) [Aortic Stenosis] : no aortic stenosis [Atrial Fibrillation] : no atrial fibrillation [Coronary Artery Disease] : no coronary artery disease [Recent Myocardial Infarction] : no recent myocardial infarction [Implantable Device/Pacemaker] : no implantable device/pacemaker [COPD] : no COPD [Smoker] : not a smoker [Chronic Anticoagulation] : no chronic anticoagulation [FreeTextEntry1] : Cataract surgery  [FreeTextEntry2] : 09/16 [FreeTextEntry3] : Dr. Mina [FreeTextEntry4] : 69 y.o M w/ PMHx of controlled asthma, depression, DM, HLD, HTN and GLADYS on CPAP and vit D def presents for pre-op evaluation for left cataract surgery scheduled for 9/16 with Dr. Mina. Pt recently had R sided cataract surgery and tolerated the procedure well without any complications. On presentation today, pt is complaining of shortness of breath on exertion. Pt was previously evaluated in 2022 for SOB by cardiology at [facility], underwent cardiac cath that was negative. Symptoms were attributed to asthma. Since then asthma has been under control without need for rescue inhaler. Pt currently able to tolerate 2-3 flights of stairs and >2-3 blocks via walking. ROS otherwise negative.  [FreeTextEntry6] : No pertinent cardiovascular symptoms [FreeTextEntry7] : EKG with known chronic LBBB ECHO 2020 with normal LVSF, mild LV hypertrophy

## 2024-08-21 NOTE — HISTORY OF PRESENT ILLNESS
[No Pertinent Cardiac History] : no history of aortic stenosis, atrial fibrillation, coronary artery disease, recent myocardial infarction, or implantable device/pacemaker [Asthma] : asthma [Sleep Apnea] : sleep apnea [Chronic Kidney Disease] : chronic kidney disease [Diabetes] : diabetes [(Patient denies any chest pain, claudication, dyspnea on exertion, orthopnea, palpitations or syncope)] : Patient denies any chest pain, claudication, dyspnea on exertion, orthopnea, palpitations or syncope [Moderate (4-6 METs)] : Moderate (4-6 METs) [Aortic Stenosis] : no aortic stenosis [Atrial Fibrillation] : no atrial fibrillation [Coronary Artery Disease] : no coronary artery disease [Recent Myocardial Infarction] : no recent myocardial infarction [Implantable Device/Pacemaker] : no implantable device/pacemaker [COPD] : no COPD [Smoker] : not a smoker [Chronic Anticoagulation] : no chronic anticoagulation [FreeTextEntry1] : Cataract surgery  [FreeTextEntry2] : 09/16 [FreeTextEntry3] : Dr. Mina [FreeTextEntry4] : 69 y.o M w/ PMHx of controlled asthma, depression, DM, HLD, HTN and GLADYS on CPAP and vit D def presents for pre-op evaluation for left cataract surgery scheduled for 9/16 with Dr. Mina. Pt recently had R sided cataract surgery and tolerated the procedure well without any complications. On presentation today, pt is complaining of shortness of breath on exertion. Pt was previously evaluated in 2022 for SOB by cardiology at [facility], underwent cardiac cath that was negative. Symptoms were attributed to asthma. Since then asthma has been under control without need for rescue inhaler. Pt currently able to tolerate 2-3 flights of stairs and >2-3 blocks via walking. ROS otherwise negative.  [FreeTextEntry6] : No pertinent cardiovascular symptoms [FreeTextEntry7] : EKG with known chronic LBBB ECHO 2020 with normal LVSF, mild LV hypertrophy

## 2024-09-02 ENCOUNTER — NON-APPOINTMENT (OUTPATIENT)
Age: 69
End: 2024-09-02

## 2024-09-03 ENCOUNTER — TRANSCRIPTION ENCOUNTER (OUTPATIENT)
Age: 69
End: 2024-09-03

## 2024-09-06 ENCOUNTER — TRANSCRIPTION ENCOUNTER (OUTPATIENT)
Age: 69
End: 2024-09-06

## 2024-09-09 ENCOUNTER — NON-APPOINTMENT (OUTPATIENT)
Age: 69
End: 2024-09-09

## 2024-09-10 ENCOUNTER — APPOINTMENT (OUTPATIENT)
Dept: ORTHOPEDIC SURGERY | Facility: CLINIC | Age: 69
End: 2024-09-10
Payer: MEDICARE

## 2024-09-10 DIAGNOSIS — R22.31 LOCALIZED SWELLING, MASS AND LUMP, RIGHT UPPER LIMB: ICD-10-CM

## 2024-09-10 PROCEDURE — 73140 X-RAY EXAM OF FINGER(S): CPT | Mod: F5

## 2024-09-10 PROCEDURE — 99204 OFFICE O/P NEW MOD 45 MIN: CPT

## 2024-09-10 NOTE — PHYSICAL EXAM
[de-identified] : Physical exam demonstrates the patient to be alert and oriented x 3 and capable of ambulation. The patient is well-developed and well-nourished in no apparent respiratory distress. The majority of the skin is intact bilaterally in the upper extremities without any bilateral elbow lymphadenopathy.  The wrists have symmetric range of motion bilaterally. There is no tenderness over the scaphoid, scapholunate, or lunotriquetral ligaments bilaterally. There is a negative Arriaza's test bilaterally. There is no tenderness over the distal radial ulnar joint or TFCC and no evidence of instability bilaterally. There is no tenderness over the pisotriquetral joint, hamate hook, or CMC joints bilaterally. The patient is nontender over both scaphoids and anatomic snuffbox is bilaterally. There is no clubbing cyanosis or edema.  Full, symmetric digital range of motion.  There is a 5 to 7 mm soft tissue mass over the radial aspect of the right thumb IP joint.  Nontender to palpation over the mass.  No overlying skin changes, erythema or signs of infection.  The mass does not move with flexion and extension of the right thumb.  EPL and FPL are intact. There is good capillary refill of the digits bilaterally. Sensation is intact to light touch bilaterally. [de-identified] : PA, lateral and oblique x-rays of the right thumb were obtained today to assess for bony injury, masses or lesions.  No evidence of acute fracture or dislocation.  No radiopaque lesions present.

## 2024-09-10 NOTE — ASSESSMENT
[FreeTextEntry1] : My impression is that the patient has a nonaggressive right thumb soft tissue mass over the radial aspect of the thumb, just proximal to the IP joint.  I advised the patient that radiographic imaging obtained today shows no evidence of radiopaque lesions or bony injury.  I did recommend that the patient obtain an MRI with contrast of the right thumb to further assess the soft tissue mass.  I explained that the exact diagnosis of this lesion is not clear, however, given its chronicity, the chance of it being malignant sarcoma or aggressive benign lesion is unlikely, however cannot be ruled out at this time.  Both nonsurgical and surgical treatment options were discussed and given the soft tissue mass has become more bothersome in the recent months, shared decision making was made to proceed with surgical intervention to remove it.  I discussed the goals and details of the procedure and the rehab involved postoperatively along with the inherent risks and potential complications of surgery.  These included, but were not limited to, infection, wound healing issues, loss of motion, stiffness, nerve injury, numbness and tingling, electrical-burning pain, tendon rupture, postoperative pain, CRPS, swelling, recurrence of the mass and the possibility of revision surgery or reoperation, especially if malignant findings are noted on biopsy, which would warrant a larger procedure including wide excision.  Shared decision making was made to proceed with right thumb soft tissue mass excision in the near future, after the MRI is obtained for preoperative planning

## 2024-09-10 NOTE — HISTORY OF PRESENT ILLNESS
[FreeTextEntry1] : 69-year-old male presents for evaluation of a right thumb soft tissue mass which he noticed approximately 20 years.  The patient notes that the mass is only slightly increased in size since he first noticed the mass.  The patient notes that the mass has become more bothersome over the past few months, but not constantly.

## 2024-09-13 NOTE — ASU PATIENT PROFILE, ADULT - NSICDXPASTMEDICALHX_GEN_ALL_CORE_FT
PAST MEDICAL HISTORY:  Anemia     Aortic valve sclerosis     Asthma     Cataracts, bilateral     DM type 2 (diabetes mellitus, type 2)     Elephantiasis nostra verrucosa     H/O: depression     HLD (hyperlipidemia)     HTN (hypertension)     Intertrigo     Lymphedema     Obstructive sleep apnea     Systolic murmur     Ulcerative colitis      PAST MEDICAL HISTORY:  Anemia     Aortic valve sclerosis     Asthma     Cataracts, bilateral     DM type 2 (diabetes mellitus, type 2)     Elephantiasis nostra verrucosa     H/O: depression     HLD (hyperlipidemia)     HTN (hypertension)     Intertrigo     Lymphedema     Obstructive sleep apnea     Obstructive sleep apnea on CPAP     Systolic murmur     Ulcerative colitis

## 2024-09-13 NOTE — ASU PATIENT PROFILE, ADULT - PATIENT'S PREFERRED PRONOUN
Detail Level: Detailed
Him/He
Plan: This patient has been treated today with image guided superficial radiation therapy for non-melanoma skin cancer.\\n\\nWritten informed consent has been previously obtained from this patient for this treatment. This consent is documented in the patient’s chart. The patient gave verbal consent to continue treatment today.\\n\\nThe patient was treated with a specific radiation dose and setup as prescribed by the provider listed on this visit note. A Radiation Therapist performed administration of radiation under supervision of provider. The treatment parameters and cumulative dose are indicated above.\\n\\nPrior to administering the radiation, the patient underwent a verification therapeutic radiology simulation-aided field setting defining relevant normal and abnormal target anatomy and acquiring images with high frequency ultrasound in addition to data necessary developing optimal radiation treatment process for the patient. This process includes verification of the treatment port(s) and proper treatment positioning. All treatment ports were photographed within electronic medical record. The patient’s customized lead blocking along with gross tumor volume and margin was confirmed. Considering superficial radiotherapy is clinical in setup, this requires physician and radiation therapist to clarify location interest being treated against initial images, pathology and patient anatomy. Care was taken ensuring fields treated were geometrically accurate and properly positioned using therapeutic radiology simulation-aided field setting verification per fraction. This process is also utilized to determine if any prescription or setup changes are necessary. These steps are therefore medically necessary ensuring safe and effective administration of radiation. Ongoing therapeutic radiology simulation-aided field setting verification is ordered throughout course of therapy.\\n\\nA high frequency ultrasound image was acquired today for two-dimensional evaluation of the tumor volume and response to treatment, in addition to geometric accuracy of field placement. US depth is 2.21 mm, which is 0.53 mm in difference from previous imaging. The field placement and ultrasound imaging, per fraction, is separate and distinct from the initial simulation, and is an important task in providing safe administration of superficial radiation therapy. Physician evaluation of the ultrasound tumor depth will be ongoing throughout the course of treatment and is deemed medically necessary in order to ensure the efficacy of treatment and any necessary changes. Today’s image was evaluated for determination of continuation of treatment with the current plan or with necessary changes as appropriate. According to provider review of verification, therapeutic radiology simulation-aided field setting and imaging, no change is required. Additionally the use of ultrasound visualization and targeted assessment allows the patient to be able to see their cancer(s) progress, encouraging patient to complete and maintain compliance through full course of radiotherapy.\\n\\nPer Dr. Chen, continued ultrasound guidance and therapeutic radiology simulation-aided field setting verification per fraction is required for field placement, measurement of tumor depth, progress and acute effect monitoring.

## 2024-09-13 NOTE — ASU PATIENT PROFILE, ADULT - NS PREOP UNDERSTANDS INFO
No solid food/dairy/candy/gum after midnight Sunday; water/clear apple juice/black coffee/tea/Gatorade allowed before 7:30am Monday; patient reminded to come with photo ID/insurance/credit card; dress in comfortable clothes; no jewelries/contact lens/valuable; no smoking/alcohol drinking/recreational drug use Sunday; escort to have photo ID; address and callback number was given/yes

## 2024-09-13 NOTE — ASU PATIENT PROFILE, ADULT - NSICDXPASTSURGICALHX_GEN_ALL_CORE_FT
PAST SURGICAL HISTORY:  H/O umbilical hernia repair     History of appendectomy     History of cholecystectomy     History of colon resection     S/P cataract surgery right

## 2024-09-16 ENCOUNTER — APPOINTMENT (OUTPATIENT)
Dept: INTERNAL MEDICINE | Facility: CLINIC | Age: 69
End: 2024-09-16

## 2024-09-16 ENCOUNTER — OUTPATIENT (OUTPATIENT)
Dept: OUTPATIENT SERVICES | Facility: HOSPITAL | Age: 69
LOS: 1 days | Discharge: HOME | End: 2024-09-16
Payer: MEDICARE

## 2024-09-16 ENCOUNTER — APPOINTMENT (OUTPATIENT)
Dept: OPHTHALMOLOGY | Facility: AMBULATORY SURGERY CENTER | Age: 69
End: 2024-09-16

## 2024-09-16 VITALS
OXYGEN SATURATION: 97 % | RESPIRATION RATE: 12 BRPM | SYSTOLIC BLOOD PRESSURE: 114 MMHG | DIASTOLIC BLOOD PRESSURE: 56 MMHG | HEART RATE: 73 BPM

## 2024-09-16 VITALS
RESPIRATION RATE: 16 BRPM | HEART RATE: 75 BPM | TEMPERATURE: 97 F | WEIGHT: 295.86 LBS | SYSTOLIC BLOOD PRESSURE: 144 MMHG | HEIGHT: 68 IN | DIASTOLIC BLOOD PRESSURE: 74 MMHG | OXYGEN SATURATION: 98 %

## 2024-09-16 DIAGNOSIS — Z98.890 OTHER SPECIFIED POSTPROCEDURAL STATES: Chronic | ICD-10-CM

## 2024-09-16 DIAGNOSIS — Z98.49 CATARACT EXTRACTION STATUS, UNSPECIFIED EYE: Chronic | ICD-10-CM

## 2024-09-16 DIAGNOSIS — Z90.49 ACQUIRED ABSENCE OF OTHER SPECIFIED PARTS OF DIGESTIVE TRACT: Chronic | ICD-10-CM

## 2024-09-16 LAB — GLUCOSE BLDC GLUCOMTR-MCNC: 99 MG/DL — SIGNIFICANT CHANGE UP (ref 70–99)

## 2024-09-16 PROCEDURE — 66984 XCAPSL CTRC RMVL W/O ECP: CPT | Mod: LT,79

## 2024-09-16 DEVICE — LENS IOL TECNIS EYHANCE DIB00 17.5D
Type: IMPLANTABLE DEVICE | Status: NON-FUNCTIONAL
Removed: 2024-09-16

## 2024-09-16 RX ORDER — TETRACAINE HCL 0.5 %
1 DROPS OPHTHALMIC (EYE) ONCE
Refills: 0 | Status: COMPLETED | OUTPATIENT
Start: 2024-09-16 | End: 2024-09-16

## 2024-09-16 RX ORDER — TROPICAMIDE 1 %
1 DROPS OPHTHALMIC (EYE)
Refills: 0 | Status: COMPLETED | OUTPATIENT
Start: 2024-09-16 | End: 2024-09-16

## 2024-09-16 RX ORDER — PHENYLEPHRINE HYDROCHLORIDE 25 MG/ML
1 SOLUTION/ DROPS OPHTHALMIC
Refills: 0 | Status: COMPLETED | OUTPATIENT
Start: 2024-09-16 | End: 2024-09-16

## 2024-09-16 RX ORDER — OFLOXACIN 3 MG/ML
1 SOLUTION/ DROPS OPHTHALMIC
Refills: 0 | Status: COMPLETED | OUTPATIENT
Start: 2024-09-16 | End: 2024-09-16

## 2024-09-16 RX ORDER — FLUTICASONE PROPIONATE AND SALMETEROL 250; 50 UG/1; UG/1
1 POWDER RESPIRATORY (INHALATION)
Refills: 0 | DISCHARGE

## 2024-09-16 RX ADMIN — Medication 1 DROP(S): at 09:21

## 2024-09-16 RX ADMIN — OFLOXACIN 1 DROP(S): 3 SOLUTION/ DROPS OPHTHALMIC at 09:33

## 2024-09-16 RX ADMIN — Medication 1 DROP(S): at 09:48

## 2024-09-16 RX ADMIN — OFLOXACIN 1 DROP(S): 3 SOLUTION/ DROPS OPHTHALMIC at 09:20

## 2024-09-16 RX ADMIN — PHENYLEPHRINE HYDROCHLORIDE 1 DROP(S): 25 SOLUTION/ DROPS OPHTHALMIC at 09:20

## 2024-09-16 RX ADMIN — PHENYLEPHRINE HYDROCHLORIDE 1 DROP(S): 25 SOLUTION/ DROPS OPHTHALMIC at 09:47

## 2024-09-16 RX ADMIN — OFLOXACIN 1 DROP(S): 3 SOLUTION/ DROPS OPHTHALMIC at 09:47

## 2024-09-16 RX ADMIN — Medication 1 DROP(S): at 09:33

## 2024-09-16 RX ADMIN — PHENYLEPHRINE HYDROCHLORIDE 1 DROP(S): 25 SOLUTION/ DROPS OPHTHALMIC at 09:33

## 2024-09-16 NOTE — OPERATIVE REPORT - OPERATIVE RPOSRT DETAILS
SURGEON: Akira Mina MD    ASSISTANT: Michela Mortensen MD    PRE-OP DIAGNOSIS: Cataract Left Eye    POST-OP DIAGNOSIS: Same    ANESTHESIA: Mac    PROCEDURE: Cataract extraction with intraocular lens implant left eye    SPECIMEN/TISSUE REMOVED: None    ESTIMATED BLOOD LOSS: < 1mL    COMPLICATIONS: None    IMPLANT: DIB00 +17.5    PROCEDURE:    The patient was seen in the preoperative area. The risks, benefits, and alternatives to surgery were discussed. All questions were answered.  The patient was given standard preoperative drops. The patient was then brought to the operating room and prepped and draped in the usual sterile fashion for ophthalmic surgery.    A lid speculum was used to expose the eye.  A paracentesis was created with an MVR blade at 3 clock hours from the temporal limbus in the clockwise direction.  Next, 1% Preservative-free Lidocaine was instilled into the anterior chamber followed by viscoat.  A clear corneal incision was created with the aid of a crescent blade and a 2.75 mm sharped tip keratome.  A cystotome and Utrata forceps was used to create a continuous curvilinear capsulorrhexis.  Balanced salt solution was used for hydro dissection.  The nucleus was then phacoemulsified and aspirated using a divide and conquer technique.  The remaining epinucleus and cortical material was aspirated from the eye.  The capsular bag was then reformed using provisc in anticipation of the introduction of an intraocular lens implant.  The implant was injected into the capsular bag.  After centration, the remaining viscoelastic material was removed using the IA handpiece.    The temporal clear corneal and paracentesis incisions were hydrated with balanced salt solution to achieve adequate watertight closure. The wounds were checked for leaks and found to be negative.    A combination of antibiotic and steroid were applied to the surface of the eye, and the eye was shielded.    The patient tolerated the procedure well and was transferred to the recovery room in stable condition. They received standard postoperative instructions and an appointment to follow up the next day.

## 2024-09-16 NOTE — PRE-ANESTHESIA EVALUATION ADULT - HEIGHT IN FEET
Christine Adame is a 42 year old female presenting for   Chief Complaint   Patient presents with   • Injections     Triggers         Concerns: Patient here in follow up     PA approval for Injections:  PA injection approval: Triggerpoint injections    Botox Units:     Denies Latex allergy or sensitivity.      Donna Tyler MD  Health Maintenance Due   Topic Date Due   • Hepatitis B Vaccine (1 of 3 - 3-dose series) Never done   • COVID-19 Vaccine (1) Never done       Medications reviewed and updated.    Pharmacy Verified?  Yes    Social History     Tobacco Use   Smoking Status Never   Smokeless Tobacco Never       Patient's current [facility] status: Active.                           5

## 2024-09-16 NOTE — PRE-ANESTHESIA EVALUATION ADULT - NSANTHPMHFT_GEN_ALL_CORE
69 y.o M w/ PMHx of controlled asthma, depression, DM, HLD, HTN and GLADYS on CPAP and vit D def presents for pre-op evaluation for left cataract surgery scheduled for 9/16 with Dr. Mina. Pt recently had R sided cataract surgery and tolerated the procedure well without any complications. On presentation today, pt is complaining of shortness of breath on exertion. Pt was previously evaluated in 2022 for SOB by cardiology at [facility], underwent cardiac cath that was negative. Symptoms were attributed to asthma. Since then asthma has been under control without need for rescue inhaler. Pt currently able to tolerate 2-3 flights of stairs and >2-3 blocks via walking. ROS otherwise negative.    Cardiac: no history of aortic stenosis, atrial fibrillation, coronary artery disease, recent myocardial infarction, or implantable device/pacemaker, but no aortic stenosis, no atrial fibrillation, no coronary artery disease, no recent myocardial infarction and no implantable device/pacemaker.    Pulmonary: asthma and sleep apnea, but no COPD and not a smoker.    Other: chronic kidney disease and diabetes, but no chronic anticoagulation.       Cardiovascular Symptoms: Patient denies any chest pain, claudication, dyspnea on exertion, orthopnea, palpitations or syncope No pertinent cardiovascular symptoms.    Cardiac Testing & Procedure History: EKG with known chronic LBBB  ECHO 2020 with normal LVSF, mild LV hypertrophy.    Functional Capacity: Moderate (4-6 METs).

## 2024-09-16 NOTE — PRE-ANESTHESIA EVALUATION ADULT - NSANTHPEFT_GEN_ALL_CORE
A&Ox3  neuro:  neck:  lung:  cor:  extr: A&Ox3  neuro: nonfocal  neck: obese  lung: CTA  cor: RRR  abd: large ventral hernia

## 2024-09-17 ENCOUNTER — APPOINTMENT (OUTPATIENT)
Dept: OPHTHALMOLOGY | Facility: CLINIC | Age: 69
End: 2024-09-17
Payer: MEDICARE

## 2024-09-17 ENCOUNTER — NON-APPOINTMENT (OUTPATIENT)
Age: 69
End: 2024-09-17

## 2024-09-17 PROCEDURE — 99024 POSTOP FOLLOW-UP VISIT: CPT

## 2024-09-19 ENCOUNTER — APPOINTMENT (OUTPATIENT)
Dept: PULMONOLOGY | Facility: CLINIC | Age: 69
End: 2024-09-19
Payer: MEDICARE

## 2024-09-19 ENCOUNTER — APPOINTMENT (OUTPATIENT)
Dept: RADIOLOGY | Facility: CLINIC | Age: 69
End: 2024-09-19
Payer: MEDICARE

## 2024-09-19 VITALS
SYSTOLIC BLOOD PRESSURE: 110 MMHG | OXYGEN SATURATION: 98 % | WEIGHT: 298 LBS | BODY MASS INDEX: 45.16 KG/M2 | HEIGHT: 68 IN | HEART RATE: 92 BPM | DIASTOLIC BLOOD PRESSURE: 80 MMHG | TEMPERATURE: 95.3 F

## 2024-09-19 DIAGNOSIS — J45.50 SEVERE PERSISTENT ASTHMA, UNCOMPLICATED: ICD-10-CM

## 2024-09-19 DIAGNOSIS — G47.33 OBSTRUCTIVE SLEEP APNEA (ADULT) (PEDIATRIC): ICD-10-CM

## 2024-09-19 PROBLEM — E66.01 MORBID (SEVERE) OBESITY DUE TO EXCESS CALORIES: Chronic | Status: ACTIVE | Noted: 2024-09-16

## 2024-09-19 PROBLEM — K43.2 INCISIONAL HERNIA WITHOUT OBSTRUCTION OR GANGRENE: Chronic | Status: ACTIVE | Noted: 2024-09-16

## 2024-09-19 PROCEDURE — 99215 OFFICE O/P EST HI 40 MIN: CPT | Mod: 25

## 2024-09-19 PROCEDURE — 95012 NITRIC OXIDE EXP GAS DETER: CPT

## 2024-09-19 PROCEDURE — 94729 DIFFUSING CAPACITY: CPT

## 2024-09-19 PROCEDURE — 94060 EVALUATION OF WHEEZING: CPT

## 2024-09-19 PROCEDURE — 94727 GAS DIL/WSHOT DETER LNG VOL: CPT

## 2024-09-19 PROCEDURE — 71046 X-RAY EXAM CHEST 2 VIEWS: CPT

## 2024-09-19 NOTE — ASSESSMENT
[FreeTextEntry1] : Data reviewed:  Echo [facility] 8/2020: mild LVH, tr AR, mild TR  PA/lat CXR Jackelin 9/19/2024: read as minimal congestion but largely clear, blunted   PFT 12/15/16: severe EAO (FEV1 49%) w sig resp to IBD but GOLD II, nl TLC, mildly reduced DLCO / FENO 81 Vida 4/20/17: moderate obstruction, FEV1 55% / FENO 21 Vida 10/9/17: mild-mod obstruction, FEV1 70% Vida 4/25/18: mod-sev obstruction, FEV1 56% Vida 7/8/19: restricted, FEV1 77% Dustin 9/11/20: restricted, FEV1 73% Vida 9/19/2024: mod obstruction, FEV1 66% / FENO 17 PFT 9/19/2024: mild fixed obstruction, FEV1 82%, %, DLCO 95%  Impression: Dyspnea  - Asthma  - Cath - clean coronaries (2022) GLADYS, severe by his report, with sleepiness on autoPAP  Plan: Asthma remains well controlled. No sign of any new lung process. This does not explain his worsening dyspnea. Will have him see cardiology again. Cont flut-salm 250/50 bid for the asthma. For the sleepiness I will get a HST on his PAP to ascertain if the PAP is working for him.

## 2024-09-19 NOTE — HISTORY OF PRESENT ILLNESS
[Never] : never [TextBox_4] : My patient since 2016 w asthma maintained on generic flutic/salm 250/50. He has a large hernia which can't be operated on because of obesity, and he can't get laparoscopic bariatric surgery because of the hernia. Never smoker.  9/11/20: Much more dyspneic on minimal exertion, doesn't feel like asthma, associated w throat discomfort, no chest pain, arm or jaw pain. Walked here from [address], had to stop every 1/2 block. Changed to the generic inhaler without any change. Not wheezing, not coughing, no rest dyspnea. No orthopnea. Last stress test 5 years ago. Much less physically active during pandemic. Probably gained 20 lbs.  6/27/22: Since seeing me last he had a cath with clean coronaries after a positive nuclear probably false positive from attenuation. And then in the interim the breathing improved spontaneously. No interval exacerbations. Remains on generic f/s. Never had Covid. Vax x 3. He also got a second surgical opinion at [facility] and their opinion was same as our surgeons, and he saw Dr Caputo for consultation about endoscopic lap band but this didn't seem like it was going to be effective enough.  2/3/23: Follow up visit conducted by telehealth due to Covid pandemic. The patient gives consent for telehealth services, the patient and I are both in [facility], and the patient and I are the only participants on the call.  He was diagnosed w GLADYS about 12 years ago and still has this original machine. He had an in-lab test at [facility] and was told his GLADYS was severe w AHI about 80. His machine was one of the recalled Watt machines. It was otherwise in working order. It's set at 13 cm H2O. He registered his machine and was eventually offered a new machine but nothing happened. Now they are offering him a new autotitrating machine but need an rx. Is trying to lose enough weight to get his hernia operated on. Has lost about 40 lbs. Was on Ozempic but currently there is a national shortage. With this degree of weight loss he notices less dyspnea on exertion.  9/19/2024: Comes in now with generally more dyspnea on exertion. Remains on Wixela 250 bid. Not using albuterol, doesn't feel like asthma. Using CPAP nightly and machine is functioning but feels sleepy. Had a melanoma removed a year or so ago. Has lost about 60 lbs on Ozempic.

## 2024-09-20 ENCOUNTER — APPOINTMENT (OUTPATIENT)
Dept: ENDOCRINOLOGY | Facility: CLINIC | Age: 69
End: 2024-09-20
Payer: MEDICARE

## 2024-09-20 VITALS
TEMPERATURE: 97 F | OXYGEN SATURATION: 96 % | DIASTOLIC BLOOD PRESSURE: 79 MMHG | HEIGHT: 68 IN | BODY MASS INDEX: 43.8 KG/M2 | SYSTOLIC BLOOD PRESSURE: 139 MMHG | WEIGHT: 289 LBS | HEART RATE: 80 BPM

## 2024-09-20 PROCEDURE — G2211 COMPLEX E/M VISIT ADD ON: CPT

## 2024-09-20 PROCEDURE — 99214 OFFICE O/P EST MOD 30 MIN: CPT

## 2024-09-20 NOTE — HISTORY OF PRESENT ILLNESS
[FreeTextEntry1] : Patient is a 68 yo man with type 2 diabetes, BMI 45, HTN, GLADYS here for diabetes follow up  Type 2 diabetes diagnosed about 2010 based on symptoms of urinary frequency. He was started on insulin right away. Currently on levemir 24 units at bedtime, metformin 1000 mg BID and weekly ozempic 0.5. 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 He discontinued novolog in 2024 Glucose levels are good.  AM glucose in the 80s and 2 hours post prandial in the 130s, 4 hours after 97 Denies hypoglycemia Breakfast: eggs and toast Lunch: fruit like crazy Dinner: last night he had shredded pork in peking sauce from [employer] restaurant with rice 80% foods are purchased or premade foods Does not drink soda; has florencetzer Dilated eye exam: goes every six months Utilizes CPAP for GLADYS Recently had Mohs surgery for right temporal melanoma In the interim, he reports SOB on exertions, saw pulmonary who recommended cardiology follow up which he will do. The patient saw renal for elevated creatinine that was checked preoperative for cataract surgery.

## 2024-09-20 NOTE — ASSESSMENT
[FreeTextEntry1] : Patient is a 68 yo man here for diabetes follow up  1. T2DM -patient's July 2024 serum A1c was 5.8% and he is at goal -he has established care with nephrology -interval symptoms include SOB on exertion and he has seen pulmonary. His next follow up will be with cardiology -decrease levemir to 22 at bedtime. Continue metformin 1000 mg BID and ozempic 0.5 mg weekly -diet remains somewhat liberal. 80% of foods are purchased or premade -up to date with dilated eye exam -we discussed increasing ozempic. Educated that his history of pancreatitis puts him at risk. There have not been significant studies regarding GLP 1 agonist and pancreatitis-no absolute contraindication but high caution -refills for ozempic provided  2. Class III obesity -since starting Ozempic, he has had significant weight loss. BMI is lower. It's important to note also that bolus insulin was discontinued and insulin is an anabolic agent -he is without reported side effects -we have had discussions about potential risks of pancreatitis. He is aware. Denies hx of MEN/MTC.  Patient informed about my leave.  Information for Lesvia Ramos at [address] provided

## 2024-09-20 NOTE — PHYSICAL EXAM
[Alert] : alert [Well Nourished] : well nourished [No Acute Distress] : no acute distress [Normal Hearing] : hearing was normal [No Respiratory Distress] : no respiratory distress [No Accessory Muscle Use] : no accessory muscle use [Clear to Auscultation] : lungs were clear to auscultation bilaterally [Normal S1, S2] : normal S1 and S2 [Normal Rate] : heart rate was normal [Normal Bowel Sounds] : normal bowel sounds [Not Tender] : non-tender [Soft] : abdomen soft [Normal Affect] : the affect was normal [Normal Mood] : the mood was normal [de-identified] : large hernia

## 2024-09-20 NOTE — REVIEW OF SYSTEMS
[Fatigue] : no fatigue [Dysphagia] : no dysphagia [Dysphonia] : no dysphonia [Chest Pain] : no chest pain [Slow Heart Rate] : heart rate is not slow [Palpitations] : no palpitations [Fast Heart Rate] : heart rate is not fast [As Noted in HPI] : as noted in HPI [Shortness Of Breath] : shortness of breath [Cough] : no cough [SOB on Exertion] : shortness of breath on exertion [Nausea] : no nausea [Constipation] : no constipation [Vomiting] : no vomiting [Diarrhea] : no diarrhea

## 2024-09-23 ENCOUNTER — NON-APPOINTMENT (OUTPATIENT)
Age: 69
End: 2024-09-23

## 2024-09-23 ENCOUNTER — TRANSCRIPTION ENCOUNTER (OUTPATIENT)
Age: 69
End: 2024-09-23

## 2024-09-23 ENCOUNTER — APPOINTMENT (OUTPATIENT)
Dept: OPHTHALMOLOGY | Facility: CLINIC | Age: 69
End: 2024-09-23
Payer: MEDICARE

## 2024-09-23 PROCEDURE — 99024 POSTOP FOLLOW-UP VISIT: CPT

## 2024-09-25 ENCOUNTER — APPOINTMENT (OUTPATIENT)
Dept: HEART AND VASCULAR | Facility: CLINIC | Age: 69
End: 2024-09-25
Payer: MEDICARE

## 2024-09-25 ENCOUNTER — NON-APPOINTMENT (OUTPATIENT)
Age: 69
End: 2024-09-25

## 2024-09-25 VITALS
WEIGHT: 295 LBS | HEART RATE: 103 BPM | BODY MASS INDEX: 44.71 KG/M2 | OXYGEN SATURATION: 96 % | HEIGHT: 68 IN | TEMPERATURE: 98.3 F | SYSTOLIC BLOOD PRESSURE: 106 MMHG | DIASTOLIC BLOOD PRESSURE: 61 MMHG

## 2024-09-25 DIAGNOSIS — I10 ESSENTIAL (PRIMARY) HYPERTENSION: ICD-10-CM

## 2024-09-25 DIAGNOSIS — R06.09 OTHER FORMS OF DYSPNEA: ICD-10-CM

## 2024-09-25 DIAGNOSIS — E78.5 HYPERLIPIDEMIA, UNSPECIFIED: ICD-10-CM

## 2024-09-25 DIAGNOSIS — R01.1 CARDIAC MURMUR, UNSPECIFIED: ICD-10-CM

## 2024-09-25 DIAGNOSIS — E11.9 TYPE 2 DIABETES MELLITUS W/OUT COMPLICATIONS: ICD-10-CM

## 2024-09-25 PROCEDURE — 93000 ELECTROCARDIOGRAM COMPLETE: CPT

## 2024-09-25 PROCEDURE — 99214 OFFICE O/P EST MOD 30 MIN: CPT | Mod: 25

## 2024-09-25 NOTE — REASON FOR VISIT
[FreeTextEntry1] : 70 y/o M with BANKS.  He has to stop when walking.  Even getting dressed causes BANKS.  He does note neck discomfort, better with rest.  Pt has never smoked.  CRFs include HTN, HLD, DM and he also has GLADYS.    12/22/20  s/p Nuc, large Inf, IL and apical MI, NL EF, cath only luminal irregularities and echo was also NL...Nuc most likely a false (+) from attenuation. 6/1/21  walking more, much less SOB.  Had the Covid Vaccine.   A1c 7.1, LDL 35, , On Ozempic, losing wt. 12/7/21 walking much better, good wt loss. 12/6/22 Loosing wt, 75 lbs.  Getting strange nasal pain and HA, saw ENT, scan (-).  Also get right flank pain.  US (-).  No CP 9/25/24  Here for BANKS.  Prior w/u in 2020 was (-).   Recent CXR with mild PVC. EKG: NSR, PVC, left anterior hemiblock, possible ASWMI,  no ST-Tw abn. 10/16/20 EKG: NSR, left anterior hemiblock, PRWP, no ST-Tw abn. 12/22/20

## 2024-09-25 NOTE — ASSESSMENT
[FreeTextEntry1] : BANKS- s/p w/u in 2020. He also reported neck discomfort with exertion. s/p a Pharm Nuc stress test at [facility] where the weight limit is higher than on our camera.  If Nuc is abnormal then I would favor a cardiac cath.  Continue Lipitor 40 mg.  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  Cath discussed in detail.  Pt agrees.  Cath clean in 2020.  Here now in 2024 with similar c/o. Evaluated by Dr Barr.  CXR with mild PVC.  Exam OK, EKG unchanged, BNP sent, echo ordered.  He has an aortic V murmur.  HTN- BP good on Lisinopril and is off HCTZ  Hypertriglyceridemia- Parallels the glucose, no new meds, 163.  Losing wt nicely.  A1c 5.8  Atypical CP   s/p Nuc in 2020, large Inf, IL and apical MI, NL EF, cath only luminal irregularities and echo was also NL...Nuc most likely a false (+) from attenuation.  Prilosec  x 30 days  Hernia- I would clear him.  Losing Weight on Ozempic.  Saw Dr Mukesh Douglas.

## 2024-09-27 ENCOUNTER — TRANSCRIPTION ENCOUNTER (OUTPATIENT)
Age: 69
End: 2024-09-27

## 2024-09-30 LAB — NT-PROBNP SERPL-MCNC: 406 PG/ML

## 2024-10-01 ENCOUNTER — RX RENEWAL (OUTPATIENT)
Age: 69
End: 2024-10-01

## 2024-10-08 ENCOUNTER — APPOINTMENT (OUTPATIENT)
Dept: OPHTHALMOLOGY | Facility: CLINIC | Age: 69
End: 2024-10-08

## 2024-10-10 ENCOUNTER — APPOINTMENT (OUTPATIENT)
Dept: OPHTHALMOLOGY | Facility: CLINIC | Age: 69
End: 2024-10-10

## 2024-10-11 ENCOUNTER — TRANSCRIPTION ENCOUNTER (OUTPATIENT)
Age: 69
End: 2024-10-11

## 2024-10-14 ENCOUNTER — APPOINTMENT (OUTPATIENT)
Dept: INTERNAL MEDICINE | Facility: CLINIC | Age: 69
End: 2024-10-14
Payer: MEDICARE

## 2024-10-14 ENCOUNTER — TRANSCRIPTION ENCOUNTER (OUTPATIENT)
Age: 69
End: 2024-10-14

## 2024-10-14 ENCOUNTER — APPOINTMENT (OUTPATIENT)
Dept: OPHTHALMOLOGY | Facility: CLINIC | Age: 69
End: 2024-10-14

## 2024-10-14 DIAGNOSIS — E11.9 TYPE 2 DIABETES MELLITUS W/OUT COMPLICATIONS: ICD-10-CM

## 2024-10-14 DIAGNOSIS — F32.A DEPRESSION, UNSPECIFIED: ICD-10-CM

## 2024-10-14 PROCEDURE — 99443: CPT

## 2024-10-16 ENCOUNTER — APPOINTMENT (OUTPATIENT)
Dept: OPHTHALMOLOGY | Facility: CLINIC | Age: 69
End: 2024-10-16
Payer: MEDICARE

## 2024-10-16 ENCOUNTER — NON-APPOINTMENT (OUTPATIENT)
Age: 69
End: 2024-10-16

## 2024-10-16 PROCEDURE — 99024 POSTOP FOLLOW-UP VISIT: CPT

## 2024-10-17 ENCOUNTER — APPOINTMENT (OUTPATIENT)
Dept: HEART AND VASCULAR | Facility: CLINIC | Age: 69
End: 2024-10-17
Payer: MEDICARE

## 2024-10-17 PROCEDURE — 93306 TTE W/DOPPLER COMPLETE: CPT

## 2024-10-19 ENCOUNTER — OUTPATIENT (OUTPATIENT)
Dept: OUTPATIENT SERVICES | Facility: HOSPITAL | Age: 69
LOS: 1 days | End: 2024-10-19
Payer: COMMERCIAL

## 2024-10-19 ENCOUNTER — APPOINTMENT (OUTPATIENT)
Dept: SLEEP CENTER | Facility: HOME HEALTH | Age: 69
End: 2024-10-19

## 2024-10-19 DIAGNOSIS — Z90.49 ACQUIRED ABSENCE OF OTHER SPECIFIED PARTS OF DIGESTIVE TRACT: Chronic | ICD-10-CM

## 2024-10-19 DIAGNOSIS — Z98.890 OTHER SPECIFIED POSTPROCEDURAL STATES: Chronic | ICD-10-CM

## 2024-10-19 DIAGNOSIS — Z98.49 CATARACT EXTRACTION STATUS, UNSPECIFIED EYE: Chronic | ICD-10-CM

## 2024-10-19 PROCEDURE — 95800 SLP STDY UNATTENDED: CPT

## 2024-10-19 PROCEDURE — 95800 SLP STDY UNATTENDED: CPT | Mod: 26

## 2024-10-21 DIAGNOSIS — G47.33 OBSTRUCTIVE SLEEP APNEA (ADULT) (PEDIATRIC): ICD-10-CM

## 2024-10-28 ENCOUNTER — TRANSCRIPTION ENCOUNTER (OUTPATIENT)
Age: 69
End: 2024-10-28

## 2024-10-28 ENCOUNTER — RX RENEWAL (OUTPATIENT)
Age: 69
End: 2024-10-28

## 2024-10-29 ENCOUNTER — TRANSCRIPTION ENCOUNTER (OUTPATIENT)
Age: 69
End: 2024-10-29

## 2024-11-05 ENCOUNTER — TRANSCRIPTION ENCOUNTER (OUTPATIENT)
Age: 69
End: 2024-11-05

## 2024-11-14 ENCOUNTER — RX RENEWAL (OUTPATIENT)
Age: 69
End: 2024-11-14

## 2024-11-26 ENCOUNTER — APPOINTMENT (OUTPATIENT)
Dept: NEPHROLOGY | Facility: CLINIC | Age: 69
End: 2024-11-26
Payer: MEDICARE

## 2024-11-26 VITALS
SYSTOLIC BLOOD PRESSURE: 122 MMHG | BODY MASS INDEX: 44.71 KG/M2 | DIASTOLIC BLOOD PRESSURE: 73 MMHG | WEIGHT: 295 LBS | HEIGHT: 68 IN

## 2024-11-26 DIAGNOSIS — E66.01 MORBID (SEVERE) OBESITY DUE TO EXCESS CALORIES: ICD-10-CM

## 2024-11-26 DIAGNOSIS — N18.31 CHRONIC KIDNEY DISEASE, STAGE 3A: ICD-10-CM

## 2024-11-26 DIAGNOSIS — I10 ESSENTIAL (PRIMARY) HYPERTENSION: ICD-10-CM

## 2024-11-26 DIAGNOSIS — I89.0 LYMPHEDEMA, NOT ELSEWHERE CLASSIFIED: ICD-10-CM

## 2024-11-26 DIAGNOSIS — N17.9 ACUTE KIDNEY FAILURE, UNSPECIFIED: ICD-10-CM

## 2024-11-26 PROCEDURE — G2211 COMPLEX E/M VISIT ADD ON: CPT

## 2024-11-26 PROCEDURE — 99214 OFFICE O/P EST MOD 30 MIN: CPT

## 2024-11-27 LAB
ANION GAP SERPL CALC-SCNC: 12 MMOL/L
BUN SERPL-MCNC: 24 MG/DL
CALCIUM SERPL-MCNC: 9.4 MG/DL
CALCIUM SERPL-MCNC: 9.4 MG/DL
CHLORIDE SERPL-SCNC: 108 MMOL/L
CO2 SERPL-SCNC: 24 MMOL/L
CREAT SERPL-MCNC: 1.45 MG/DL
CREAT SPEC-SCNC: 226 MG/DL
CYSTATIN C SERPL-MCNC: 1.47 MG/L
EGFR: 52 ML/MIN/1.73M2
GFR/BSA.PRED SERPLBLD CYS-BASED-ARV: 45 ML/MIN/1.73M2
GLUCOSE SERPL-MCNC: 106 MG/DL
MICROALBUMIN 24H UR DL<=1MG/L-MCNC: 2.4 MG/DL
MICROALBUMIN/CREAT 24H UR-RTO: 11 MG/G
PARATHYROID HORMONE INTACT: 47 PG/ML
POTASSIUM SERPL-SCNC: 4.7 MMOL/L
SODIUM SERPL-SCNC: 144 MMOL/L

## 2024-12-03 ENCOUNTER — APPOINTMENT (OUTPATIENT)
Dept: DERMATOLOGY | Facility: CLINIC | Age: 69
End: 2024-12-03
Payer: MEDICARE

## 2024-12-03 DIAGNOSIS — L57.0 ACTINIC KERATOSIS: ICD-10-CM

## 2024-12-03 DIAGNOSIS — L30.4 ERYTHEMA INTERTRIGO: ICD-10-CM

## 2024-12-03 DIAGNOSIS — I89.0 LYMPHEDEMA, NOT ELSEWHERE CLASSIFIED: ICD-10-CM

## 2024-12-03 DIAGNOSIS — D48.9 NEOPLASM OF UNCERTAIN BEHAVIOR, UNSPECIFIED: ICD-10-CM

## 2024-12-03 DIAGNOSIS — Z12.83 ENCOUNTER FOR SCREENING FOR MALIGNANT NEOPLASM OF SKIN: ICD-10-CM

## 2024-12-03 DIAGNOSIS — R22.32 LOCALIZED SWELLING, MASS AND LUMP, LEFT UPPER LIMB: ICD-10-CM

## 2024-12-03 DIAGNOSIS — L82.1 OTHER SEBORRHEIC KERATOSIS: ICD-10-CM

## 2024-12-03 DIAGNOSIS — L85.9 EPIDERMAL THICKENING, UNSPECIFIED: ICD-10-CM

## 2024-12-03 DIAGNOSIS — D22.9 MELANOCYTIC NEVI, UNSPECIFIED: ICD-10-CM

## 2024-12-03 PROCEDURE — 17003 DESTRUCT PREMALG LES 2-14: CPT

## 2024-12-03 PROCEDURE — 11104 PUNCH BX SKIN SINGLE LESION: CPT

## 2024-12-03 PROCEDURE — 17000 DESTRUCT PREMALG LESION: CPT | Mod: 59

## 2024-12-03 PROCEDURE — 99214 OFFICE O/P EST MOD 30 MIN: CPT | Mod: 25

## 2024-12-03 RX ORDER — UREA 40 G/100G
40 CREAM TOPICAL
Qty: 1 | Refills: 1 | Status: ACTIVE | COMMUNITY
Start: 2024-12-03 | End: 1900-01-01

## 2024-12-11 LAB — DERMATOLOGY BIOPSY: NORMAL

## 2024-12-19 ENCOUNTER — TRANSCRIPTION ENCOUNTER (OUTPATIENT)
Age: 69
End: 2024-12-19

## 2024-12-30 RX ORDER — FLUTICASONE PROPIONATE AND SALMETEROL 100; 50 UG/1; UG/1
100-50 POWDER RESPIRATORY (INHALATION)
Qty: 1 | Refills: 0 | Status: ACTIVE | COMMUNITY
Start: 2024-12-30 | End: 1900-01-01

## 2024-12-31 ENCOUNTER — TRANSCRIPTION ENCOUNTER (OUTPATIENT)
Age: 69
End: 2024-12-31

## 2025-01-06 ENCOUNTER — TRANSCRIPTION ENCOUNTER (OUTPATIENT)
Age: 70
End: 2025-01-06

## 2025-01-09 ENCOUNTER — APPOINTMENT (OUTPATIENT)
Dept: INTERNAL MEDICINE | Facility: CLINIC | Age: 70
End: 2025-01-09
Payer: MEDICARE

## 2025-01-09 VITALS
TEMPERATURE: 97.6 F | BODY MASS INDEX: 43.69 KG/M2 | WEIGHT: 295 LBS | HEIGHT: 69 IN | HEART RATE: 79 BPM | SYSTOLIC BLOOD PRESSURE: 138 MMHG | DIASTOLIC BLOOD PRESSURE: 84 MMHG | OXYGEN SATURATION: 97 %

## 2025-01-09 DIAGNOSIS — R22.31 LOCALIZED SWELLING, MASS AND LUMP, RIGHT UPPER LIMB: ICD-10-CM

## 2025-01-09 DIAGNOSIS — Z01.818 ENCOUNTER FOR OTHER PREPROCEDURAL EXAMINATION: ICD-10-CM

## 2025-01-09 PROCEDURE — 93000 ELECTROCARDIOGRAM COMPLETE: CPT

## 2025-01-09 PROCEDURE — G2211 COMPLEX E/M VISIT ADD ON: CPT

## 2025-01-09 PROCEDURE — 36415 COLL VENOUS BLD VENIPUNCTURE: CPT

## 2025-01-09 PROCEDURE — 99215 OFFICE O/P EST HI 40 MIN: CPT | Mod: GC

## 2025-01-10 LAB
ALBUMIN SERPL ELPH-MCNC: 4 G/DL
ALP BLD-CCNC: 75 U/L
ALT SERPL-CCNC: 20 U/L
ANION GAP SERPL CALC-SCNC: 14 MMOL/L
AST SERPL-CCNC: 18 U/L
BASOPHILS # BLD AUTO: 0.09 K/UL
BASOPHILS NFR BLD AUTO: 1.3 %
BILIRUB SERPL-MCNC: 0.3 MG/DL
BUN SERPL-MCNC: 27 MG/DL
CALCIUM SERPL-MCNC: 9.5 MG/DL
CHLORIDE SERPL-SCNC: 105 MMOL/L
CHOLEST SERPL-MCNC: 128 MG/DL
CO2 SERPL-SCNC: 23 MMOL/L
CREAT SERPL-MCNC: 1.28 MG/DL
EGFR: 61 ML/MIN/1.73M2
EOSINOPHIL # BLD AUTO: 0.55 K/UL
EOSINOPHIL NFR BLD AUTO: 7.8 %
ESTIMATED AVERAGE GLUCOSE: 120 MG/DL
GLUCOSE SERPL-MCNC: 86 MG/DL
HBA1C MFR BLD HPLC: 5.8 %
HCT VFR BLD CALC: 41.1 %
HDLC SERPL-MCNC: 62 MG/DL
HGB BLD-MCNC: 12.8 G/DL
IMM GRANULOCYTES NFR BLD AUTO: 0.3 %
LDLC SERPL CALC-MCNC: 44 MG/DL
LYMPHOCYTES # BLD AUTO: 1.41 K/UL
LYMPHOCYTES NFR BLD AUTO: 19.9 %
MAN DIFF?: NORMAL
MCHC RBC-ENTMCNC: 28.9 PG
MCHC RBC-ENTMCNC: 31.1 G/DL
MCV RBC AUTO: 92.8 FL
MONOCYTES # BLD AUTO: 0.62 K/UL
MONOCYTES NFR BLD AUTO: 8.7 %
NEUTROPHILS # BLD AUTO: 4.4 K/UL
NEUTROPHILS NFR BLD AUTO: 62 %
NONHDLC SERPL-MCNC: 66 MG/DL
PLATELET # BLD AUTO: 263 K/UL
POTASSIUM SERPL-SCNC: 4.6 MMOL/L
PROT SERPL-MCNC: 6.3 G/DL
RBC # BLD: 4.43 M/UL
RBC # FLD: 14.2 %
SODIUM SERPL-SCNC: 141 MMOL/L
TRIGL SERPL-MCNC: 130 MG/DL
WBC # FLD AUTO: 7.09 K/UL

## 2025-01-14 ENCOUNTER — TRANSCRIPTION ENCOUNTER (OUTPATIENT)
Age: 70
End: 2025-01-14

## 2025-01-17 ENCOUNTER — APPOINTMENT (OUTPATIENT)
Dept: MRI IMAGING | Facility: CLINIC | Age: 70
End: 2025-01-17

## 2025-01-17 ENCOUNTER — RESULT REVIEW (OUTPATIENT)
Age: 70
End: 2025-01-17

## 2025-01-17 PROCEDURE — A9585: CPT

## 2025-01-17 PROCEDURE — 73220 MRI UPPR EXTREMITY W/O&W/DYE: CPT | Mod: RT

## 2025-01-21 ENCOUNTER — TRANSCRIPTION ENCOUNTER (OUTPATIENT)
Age: 70
End: 2025-01-21

## 2025-01-21 RX ORDER — CHLORHEXIDINE GLUCONATE 1.2 MG/ML
1 RINSE ORAL DAILY
Refills: 0 | Status: DISCONTINUED | OUTPATIENT
Start: 2025-01-22 | End: 2025-01-22

## 2025-01-21 RX ORDER — ACETAMINOPHEN AND CODEINE PHOSPHATE 300; 30 MG/1; MG/1
300-30 TABLET ORAL TWICE DAILY
Qty: 10 | Refills: 0 | Status: ACTIVE | COMMUNITY
Start: 2025-01-21 | End: 1900-01-01

## 2025-01-21 NOTE — ASU DISCHARGE PLAN (ADULT/PEDIATRIC) - FINANCIAL ASSISTANCE
[facility] provides services at a reduced cost to those who are determined to be eligible through [facility]’s financial assistance program. Information regarding [facility]’s financial assistance program can be found by going to [email or web address] or by calling [phone].

## 2025-01-21 NOTE — ASU DISCHARGE PLAN (ADULT/PEDIATRIC) - ASU DC SPECIAL INSTRUCTIONSFT
Do not remove dressing/bandage  Keep dressing clean and dry  Digital range of motion  Hand elevation     Please call the office at [phone] if you have any questions or concerns.

## 2025-01-21 NOTE — ASU PATIENT PROFILE, ADULT - NSICDXPASTMEDICALHX_GEN_ALL_CORE_FT
PAST MEDICAL HISTORY:  Anemia     Aortic valve sclerosis     Asthma     Cataracts, bilateral     DM type 2 (diabetes mellitus, type 2)     Elephantiasis nostra verrucosa     H/O: depression     HLD (hyperlipidemia)     HTN (hypertension)     Intertrigo     Lymphedema     Morbid obesity with BMI of 45.0-49.9, adult     Obstructive sleep apnea     Obstructive sleep apnea on CPAP     Systolic murmur     Ulcerative colitis     Ventral incisional hernia      PAST MEDICAL HISTORY:  Abdominal hernia     Anemia     Aortic valve sclerosis     Asthma     Cataracts, bilateral     DM type 2 (diabetes mellitus, type 2)     Elephantiasis nostra verrucosa     H/O: depression     HLD (hyperlipidemia)     HTN (hypertension)     Intertrigo     Lymphedema     Morbid obesity with BMI of 45.0-49.9, adult     Obstructive sleep apnea     Obstructive sleep apnea on CPAP     Systolic murmur     Ulcerative colitis     Ventral incisional hernia

## 2025-01-21 NOTE — ASU PATIENT PROFILE, ADULT - NSICDXPASTSURGICALHX_GEN_ALL_CORE_FT
PAST SURGICAL HISTORY:  H/O umbilical hernia repair     History of appendectomy     History of cholecystectomy     History of colon resection     S/P cataract surgery right     PAST SURGICAL HISTORY:  Cataract, left eye removed    H/O umbilical hernia repair     History of appendectomy     History of cholecystectomy     History of colon resection     S/P cataract surgery right

## 2025-01-22 ENCOUNTER — APPOINTMENT (OUTPATIENT)
Dept: ORTHOPEDIC SURGERY | Facility: AMBULATORY SURGERY CENTER | Age: 70
End: 2025-01-22

## 2025-01-22 ENCOUNTER — RESULT REVIEW (OUTPATIENT)
Age: 70
End: 2025-01-22

## 2025-01-22 ENCOUNTER — OUTPATIENT (OUTPATIENT)
Dept: OUTPATIENT SERVICES | Facility: HOSPITAL | Age: 70
LOS: 1 days | Discharge: HOME | End: 2025-01-22
Payer: MEDICARE

## 2025-01-22 ENCOUNTER — TRANSCRIPTION ENCOUNTER (OUTPATIENT)
Age: 70
End: 2025-01-22

## 2025-01-22 VITALS
HEART RATE: 73 BPM | RESPIRATION RATE: 16 BRPM | TEMPERATURE: 97 F | DIASTOLIC BLOOD PRESSURE: 63 MMHG | SYSTOLIC BLOOD PRESSURE: 119 MMHG | OXYGEN SATURATION: 98 %

## 2025-01-22 VITALS
HEART RATE: 86 BPM | HEIGHT: 69 IN | DIASTOLIC BLOOD PRESSURE: 73 MMHG | OXYGEN SATURATION: 97 % | SYSTOLIC BLOOD PRESSURE: 126 MMHG | WEIGHT: 291.89 LBS | RESPIRATION RATE: 16 BRPM | TEMPERATURE: 98 F

## 2025-01-22 DIAGNOSIS — H26.9 UNSPECIFIED CATARACT: Chronic | ICD-10-CM

## 2025-01-22 DIAGNOSIS — Z98.49 CATARACT EXTRACTION STATUS, UNSPECIFIED EYE: Chronic | ICD-10-CM

## 2025-01-22 DIAGNOSIS — Z90.49 ACQUIRED ABSENCE OF OTHER SPECIFIED PARTS OF DIGESTIVE TRACT: Chronic | ICD-10-CM

## 2025-01-22 DIAGNOSIS — Z98.890 OTHER SPECIFIED POSTPROCEDURAL STATES: Chronic | ICD-10-CM

## 2025-01-22 PROCEDURE — 26116 EXC HAND TUM DEEP < 1.5 CM: CPT | Mod: F5

## 2025-01-22 PROCEDURE — 88304 TISSUE EXAM BY PATHOLOGIST: CPT | Mod: 26

## 2025-01-22 RX ORDER — SODIUM CHLORIDE 9 MG/ML
1000 INJECTION, SOLUTION INTRAVENOUS
Refills: 0 | Status: DISCONTINUED | OUTPATIENT
Start: 2025-01-22 | End: 2025-01-22

## 2025-01-22 RX ORDER — FENTANYL 75 UG/H
25 PATCH, EXTENDED RELEASE TRANSDERMAL
Refills: 0 | Status: DISCONTINUED | OUTPATIENT
Start: 2025-01-22 | End: 2025-01-22

## 2025-01-22 RX ORDER — VANCOMYCIN HYDROCHLORIDE 5 G/100ML
2000 INJECTION, POWDER, LYOPHILIZED, FOR SOLUTION INTRAVENOUS ONCE
Refills: 0 | Status: DISCONTINUED | OUTPATIENT
Start: 2025-01-22 | End: 2025-01-22

## 2025-01-22 RX ORDER — ONDANSETRON 4 MG/1
4 TABLET ORAL ONCE
Refills: 0 | Status: DISCONTINUED | OUTPATIENT
Start: 2025-01-22 | End: 2025-01-22

## 2025-01-22 RX ORDER — LISINOPRIL 30 MG/1
1 TABLET ORAL
Refills: 0 | DISCHARGE

## 2025-01-22 RX ADMIN — SODIUM CHLORIDE 100 MILLILITER(S): 9 INJECTION, SOLUTION INTRAVENOUS at 11:53

## 2025-01-22 RX ADMIN — CHLORHEXIDINE GLUCONATE 1 APPLICATION(S): 1.2 RINSE ORAL at 09:39

## 2025-01-27 ENCOUNTER — RX RENEWAL (OUTPATIENT)
Age: 70
End: 2025-01-27

## 2025-01-28 NOTE — PHYSICAL EXAM
No [Normal Sclera/Conjunctiva] : normal sclera/conjunctiva [No JVD] : no jugular venous distention [Supple] : supple [No Varicosities] : no varicosities [No Edema] : there was no peripheral edema [Soft] : abdomen soft [Non Tender] : non-tender [Non-distended] : non-distended [Normal Bowel Sounds] : normal bowel sounds [Normal] : normal gait, coordination grossly intact, no focal deficits and deep tendon reflexes were 2+ and symmetric [EOMI] : extraocular movements intact [de-identified] : Large abd hernia down to knees.  [de-identified] : Mild erythema around the underside of hernia.

## 2025-01-31 ENCOUNTER — NON-APPOINTMENT (OUTPATIENT)
Age: 70
End: 2025-01-31

## 2025-02-06 ENCOUNTER — TRANSCRIPTION ENCOUNTER (OUTPATIENT)
Age: 70
End: 2025-02-06

## 2025-02-07 ENCOUNTER — APPOINTMENT (OUTPATIENT)
Dept: ORTHOPEDIC SURGERY | Facility: CLINIC | Age: 70
End: 2025-02-07
Payer: MEDICARE

## 2025-02-07 DIAGNOSIS — R22.31 LOCALIZED SWELLING, MASS AND LUMP, RIGHT UPPER LIMB: ICD-10-CM

## 2025-02-07 PROCEDURE — 99024 POSTOP FOLLOW-UP VISIT: CPT

## 2025-02-12 ENCOUNTER — TRANSCRIPTION ENCOUNTER (OUTPATIENT)
Age: 70
End: 2025-02-12

## 2025-02-20 ENCOUNTER — TRANSCRIPTION ENCOUNTER (OUTPATIENT)
Age: 70
End: 2025-02-20

## 2025-02-20 ENCOUNTER — APPOINTMENT (OUTPATIENT)
Dept: INTERNAL MEDICINE | Facility: CLINIC | Age: 70
End: 2025-02-20
Payer: MEDICARE

## 2025-02-20 VITALS
HEIGHT: 69 IN | DIASTOLIC BLOOD PRESSURE: 77 MMHG | SYSTOLIC BLOOD PRESSURE: 120 MMHG | BODY MASS INDEX: 44.14 KG/M2 | WEIGHT: 298 LBS | HEART RATE: 83 BPM | TEMPERATURE: 97.3 F | OXYGEN SATURATION: 96 %

## 2025-02-20 DIAGNOSIS — Z23 ENCOUNTER FOR IMMUNIZATION: ICD-10-CM

## 2025-02-20 DIAGNOSIS — E11.9 TYPE 2 DIABETES MELLITUS W/OUT COMPLICATIONS: ICD-10-CM

## 2025-02-20 DIAGNOSIS — I10 ESSENTIAL (PRIMARY) HYPERTENSION: ICD-10-CM

## 2025-02-20 PROCEDURE — G2211 COMPLEX E/M VISIT ADD ON: CPT

## 2025-02-20 PROCEDURE — 99213 OFFICE O/P EST LOW 20 MIN: CPT

## 2025-02-21 ENCOUNTER — TRANSCRIPTION ENCOUNTER (OUTPATIENT)
Age: 70
End: 2025-02-21

## 2025-02-21 RX ORDER — SALMONELLA TYPHI TY21A 6000000000 [CFU]/1
CAPSULE, COATED ORAL
Qty: 1 | Refills: 0 | Status: ACTIVE | COMMUNITY
Start: 2025-02-21 | End: 1900-01-01

## 2025-02-26 ENCOUNTER — RX RENEWAL (OUTPATIENT)
Age: 70
End: 2025-02-26

## 2025-02-26 ENCOUNTER — TRANSCRIPTION ENCOUNTER (OUTPATIENT)
Age: 70
End: 2025-02-26

## 2025-03-04 ENCOUNTER — APPOINTMENT (OUTPATIENT)
Dept: INTERNAL MEDICINE | Facility: CLINIC | Age: 70
End: 2025-03-04
Payer: MEDICARE

## 2025-03-04 ENCOUNTER — TRANSCRIPTION ENCOUNTER (OUTPATIENT)
Age: 70
End: 2025-03-04

## 2025-03-04 DIAGNOSIS — I10 ESSENTIAL (PRIMARY) HYPERTENSION: ICD-10-CM

## 2025-03-04 DIAGNOSIS — E11.9 TYPE 2 DIABETES MELLITUS W/OUT COMPLICATIONS: ICD-10-CM

## 2025-03-04 PROCEDURE — 99213 OFFICE O/P EST LOW 20 MIN: CPT

## 2025-03-05 ENCOUNTER — NON-APPOINTMENT (OUTPATIENT)
Age: 70
End: 2025-03-05

## 2025-03-05 ENCOUNTER — TRANSCRIPTION ENCOUNTER (OUTPATIENT)
Age: 70
End: 2025-03-05

## 2025-03-06 ENCOUNTER — TRANSCRIPTION ENCOUNTER (OUTPATIENT)
Age: 70
End: 2025-03-06

## 2025-03-11 ENCOUNTER — TRANSCRIPTION ENCOUNTER (OUTPATIENT)
Age: 70
End: 2025-03-11

## 2025-03-18 ENCOUNTER — APPOINTMENT (OUTPATIENT)
Dept: INTERNAL MEDICINE | Facility: CLINIC | Age: 70
End: 2025-03-18

## 2025-03-24 ENCOUNTER — APPOINTMENT (OUTPATIENT)
Dept: INTERNAL MEDICINE | Facility: CLINIC | Age: 70
End: 2025-03-24
Payer: MEDICARE

## 2025-03-24 DIAGNOSIS — E11.9 TYPE 2 DIABETES MELLITUS W/OUT COMPLICATIONS: ICD-10-CM

## 2025-03-24 DIAGNOSIS — E66.01 MORBID (SEVERE) OBESITY DUE TO EXCESS CALORIES: ICD-10-CM

## 2025-03-24 DIAGNOSIS — K43.9 VENTRAL HERNIA W/OUT OBSTRUCTION OR GANGRENE: ICD-10-CM

## 2025-03-24 PROCEDURE — G2211 COMPLEX E/M VISIT ADD ON: CPT | Mod: 93

## 2025-03-24 PROCEDURE — 99214 OFFICE O/P EST MOD 30 MIN: CPT | Mod: 93

## 2025-04-22 ENCOUNTER — TRANSCRIPTION ENCOUNTER (OUTPATIENT)
Age: 70
End: 2025-04-22

## 2025-04-22 ENCOUNTER — APPOINTMENT (OUTPATIENT)
Dept: INTERNAL MEDICINE | Facility: CLINIC | Age: 70
End: 2025-04-22
Payer: MEDICARE

## 2025-04-22 VITALS
WEIGHT: 290 LBS | BODY MASS INDEX: 42.95 KG/M2 | DIASTOLIC BLOOD PRESSURE: 79 MMHG | OXYGEN SATURATION: 95 % | HEART RATE: 97 BPM | TEMPERATURE: 97.4 F | HEIGHT: 69 IN | SYSTOLIC BLOOD PRESSURE: 121 MMHG

## 2025-04-22 DIAGNOSIS — K43.9 VENTRAL HERNIA W/OUT OBSTRUCTION OR GANGRENE: ICD-10-CM

## 2025-04-22 DIAGNOSIS — E11.9 TYPE 2 DIABETES MELLITUS W/OUT COMPLICATIONS: ICD-10-CM

## 2025-04-22 DIAGNOSIS — R06.09 OTHER FORMS OF DYSPNEA: ICD-10-CM

## 2025-04-22 PROCEDURE — 36415 COLL VENOUS BLD VENIPUNCTURE: CPT

## 2025-04-22 PROCEDURE — 99214 OFFICE O/P EST MOD 30 MIN: CPT | Mod: 25,GC

## 2025-04-22 RX ORDER — TIRZEPATIDE 2.5 MG/.5ML
2.5 INJECTION, SOLUTION SUBCUTANEOUS
Qty: 1 | Refills: 1 | Status: ACTIVE | COMMUNITY
Start: 2025-04-22 | End: 1900-01-01

## 2025-04-25 ENCOUNTER — TRANSCRIPTION ENCOUNTER (OUTPATIENT)
Age: 70
End: 2025-04-25

## 2025-04-28 LAB
ALBUMIN SERPL ELPH-MCNC: 3.9 G/DL
ALP BLD-CCNC: 68 U/L
ALT SERPL-CCNC: 20 U/L
ANION GAP SERPL CALC-SCNC: 14 MMOL/L
AST SERPL-CCNC: 15 U/L
BILIRUB SERPL-MCNC: 0.2 MG/DL
BUN SERPL-MCNC: 27 MG/DL
CALCIUM SERPL-MCNC: 9.2 MG/DL
CHLORIDE SERPL-SCNC: 107 MMOL/L
CO2 SERPL-SCNC: 22 MMOL/L
CREAT SERPL-MCNC: 1.41 MG/DL
DEPRECATED D DIMER PPP IA-ACNC: 203 NG/ML DDU
EGFRCR SERPLBLD CKD-EPI 2021: 54 ML/MIN/1.73M2
GLUCOSE SERPL-MCNC: 131 MG/DL
HCT VFR BLD CALC: 41 %
HGB BLD-MCNC: 12.4 G/DL
MCHC RBC-ENTMCNC: 29.1 PG
MCHC RBC-ENTMCNC: 30.2 G/DL
MCV RBC AUTO: 96.2 FL
PLATELET # BLD AUTO: 286 K/UL
POTASSIUM SERPL-SCNC: 4.5 MMOL/L
PROT SERPL-MCNC: 6.1 G/DL
RBC # BLD: 4.26 M/UL
RBC # FLD: 14.6 %
SODIUM SERPL-SCNC: 143 MMOL/L
TSH SERPL-ACNC: 1.38 UIU/ML
WBC # FLD AUTO: 8.1 K/UL

## 2025-05-02 ENCOUNTER — APPOINTMENT (OUTPATIENT)
Dept: OPHTHALMOLOGY | Facility: CLINIC | Age: 70
End: 2025-05-02
Payer: MEDICARE

## 2025-05-02 ENCOUNTER — NON-APPOINTMENT (OUTPATIENT)
Age: 70
End: 2025-05-02

## 2025-05-02 PROCEDURE — 92014 COMPRE OPH EXAM EST PT 1/>: CPT

## 2025-05-02 PROCEDURE — 92133 CPTRZD OPH DX IMG PST SGM ON: CPT

## 2025-05-02 PROCEDURE — 92083 EXTENDED VISUAL FIELD XM: CPT

## 2025-05-15 ENCOUNTER — TRANSCRIPTION ENCOUNTER (OUTPATIENT)
Age: 70
End: 2025-05-15

## 2025-05-20 ENCOUNTER — APPOINTMENT (OUTPATIENT)
Dept: INTERNAL MEDICINE | Facility: CLINIC | Age: 70
End: 2025-05-20
Payer: MEDICARE

## 2025-05-20 VITALS
DIASTOLIC BLOOD PRESSURE: 82 MMHG | OXYGEN SATURATION: 94 % | TEMPERATURE: 98.1 F | HEART RATE: 84 BPM | HEIGHT: 69 IN | BODY MASS INDEX: 43.69 KG/M2 | WEIGHT: 295 LBS | SYSTOLIC BLOOD PRESSURE: 125 MMHG

## 2025-05-20 DIAGNOSIS — E66.9 OBESITY, UNSPECIFIED: ICD-10-CM

## 2025-05-20 DIAGNOSIS — K43.9 VENTRAL HERNIA W/OUT OBSTRUCTION OR GANGRENE: ICD-10-CM

## 2025-05-20 DIAGNOSIS — J45.50 SEVERE PERSISTENT ASTHMA, UNCOMPLICATED: ICD-10-CM

## 2025-05-20 DIAGNOSIS — G47.33 OBSTRUCTIVE SLEEP APNEA (ADULT) (PEDIATRIC): ICD-10-CM

## 2025-05-20 DIAGNOSIS — F32.A DEPRESSION, UNSPECIFIED: ICD-10-CM

## 2025-05-20 DIAGNOSIS — E11.9 TYPE 2 DIABETES MELLITUS W/OUT COMPLICATIONS: ICD-10-CM

## 2025-05-20 PROCEDURE — G0444 DEPRESSION SCREEN ANNUAL: CPT | Mod: 59

## 2025-05-20 PROCEDURE — G0439: CPT

## 2025-05-20 PROCEDURE — 99213 OFFICE O/P EST LOW 20 MIN: CPT | Mod: 25,GC

## 2025-05-27 ENCOUNTER — APPOINTMENT (OUTPATIENT)
Dept: NEPHROLOGY | Facility: CLINIC | Age: 70
End: 2025-05-27
Payer: MEDICARE

## 2025-05-27 VITALS
DIASTOLIC BLOOD PRESSURE: 71 MMHG | SYSTOLIC BLOOD PRESSURE: 116 MMHG | BODY MASS INDEX: 43.69 KG/M2 | HEIGHT: 69 IN | WEIGHT: 295 LBS

## 2025-05-27 DIAGNOSIS — I10 ESSENTIAL (PRIMARY) HYPERTENSION: ICD-10-CM

## 2025-05-27 DIAGNOSIS — N18.31 CHRONIC KIDNEY DISEASE, STAGE 3A: ICD-10-CM

## 2025-05-27 DIAGNOSIS — N17.9 ACUTE KIDNEY FAILURE, UNSPECIFIED: ICD-10-CM

## 2025-05-27 DIAGNOSIS — E87.20 ACIDOSIS, UNSPECIFIED: ICD-10-CM

## 2025-05-27 PROCEDURE — G2211 COMPLEX E/M VISIT ADD ON: CPT

## 2025-05-27 PROCEDURE — 99214 OFFICE O/P EST MOD 30 MIN: CPT

## 2025-05-27 RX ORDER — SODIUM BICARBONATE 650 MG/1
650 TABLET ORAL TWICE DAILY
Qty: 90 | Refills: 3 | Status: ACTIVE | COMMUNITY
Start: 2025-05-27 | End: 1900-01-01

## 2025-06-10 ENCOUNTER — APPOINTMENT (OUTPATIENT)
Dept: INTERNAL MEDICINE | Facility: CLINIC | Age: 70
End: 2025-06-10
Payer: MEDICARE

## 2025-06-10 PROCEDURE — 99212 OFFICE O/P EST SF 10 MIN: CPT | Mod: GC,93

## 2025-06-18 ENCOUNTER — APPOINTMENT (OUTPATIENT)
Dept: DERMATOLOGY | Facility: CLINIC | Age: 70
End: 2025-06-18
Payer: MEDICARE

## 2025-06-18 PROBLEM — R23.8 VESICLE OF SKIN: Status: ACTIVE | Noted: 2025-06-18

## 2025-06-18 PROBLEM — R30.1: Status: ACTIVE | Noted: 2025-06-18

## 2025-06-18 PROBLEM — R22.9 SUBCUTANEOUS MASS: Status: ACTIVE | Noted: 2025-06-18

## 2025-06-18 PROCEDURE — 99214 OFFICE O/P EST MOD 30 MIN: CPT

## 2025-06-18 RX ORDER — UREA 200 MG/G
20 CREAM TOPICAL
Qty: 1 | Refills: 2 | Status: ACTIVE | COMMUNITY
Start: 2025-06-18 | End: 1900-01-01

## 2025-06-20 LAB
HSV+VZV DNA SPEC QL NAA+PROBE: NOT DETECTED
SPECIMEN SOURCE: NORMAL

## 2025-06-26 ENCOUNTER — OUTPATIENT (OUTPATIENT)
Dept: OUTPATIENT SERVICES | Facility: HOSPITAL | Age: 70
LOS: 1 days | End: 2025-06-26
Payer: COMMERCIAL

## 2025-06-26 ENCOUNTER — APPOINTMENT (OUTPATIENT)
Dept: ULTRASOUND IMAGING | Facility: HOSPITAL | Age: 70
End: 2025-06-26
Payer: MEDICARE

## 2025-06-26 ENCOUNTER — TRANSCRIPTION ENCOUNTER (OUTPATIENT)
Age: 70
End: 2025-06-26

## 2025-06-26 DIAGNOSIS — Z90.49 ACQUIRED ABSENCE OF OTHER SPECIFIED PARTS OF DIGESTIVE TRACT: Chronic | ICD-10-CM

## 2025-06-26 DIAGNOSIS — H26.9 UNSPECIFIED CATARACT: Chronic | ICD-10-CM

## 2025-06-26 DIAGNOSIS — Z98.890 OTHER SPECIFIED POSTPROCEDURAL STATES: Chronic | ICD-10-CM

## 2025-06-26 DIAGNOSIS — Z98.49 CATARACT EXTRACTION STATUS, UNSPECIFIED EYE: Chronic | ICD-10-CM

## 2025-06-26 PROCEDURE — 76705 ECHO EXAM OF ABDOMEN: CPT

## 2025-06-26 PROCEDURE — 76705 ECHO EXAM OF ABDOMEN: CPT | Mod: 26

## 2025-06-30 ENCOUNTER — TRANSCRIPTION ENCOUNTER (OUTPATIENT)
Age: 70
End: 2025-06-30

## 2025-07-01 ENCOUNTER — APPOINTMENT (OUTPATIENT)
Dept: DERMATOLOGY | Facility: CLINIC | Age: 70
End: 2025-07-01
Payer: MEDICARE

## 2025-07-01 PROCEDURE — 99213 OFFICE O/P EST LOW 20 MIN: CPT | Mod: 25

## 2025-07-01 PROCEDURE — 11104 PUNCH BX SKIN SINGLE LESION: CPT

## 2025-07-08 ENCOUNTER — APPOINTMENT (OUTPATIENT)
Dept: INTERNAL MEDICINE | Facility: CLINIC | Age: 70
End: 2025-07-08
Payer: MEDICARE

## 2025-07-08 VITALS — WEIGHT: 286 LBS | BODY MASS INDEX: 42.24 KG/M2

## 2025-07-08 PROCEDURE — G2211 COMPLEX E/M VISIT ADD ON: CPT | Mod: 93

## 2025-07-08 PROCEDURE — 99213 OFFICE O/P EST LOW 20 MIN: CPT | Mod: 93

## 2025-07-09 LAB — DERMATOLOGY BIOPSY: NORMAL

## 2025-07-15 ENCOUNTER — TRANSCRIPTION ENCOUNTER (OUTPATIENT)
Age: 70
End: 2025-07-15

## 2025-08-08 ENCOUNTER — APPOINTMENT (OUTPATIENT)
Dept: INTERNAL MEDICINE | Facility: CLINIC | Age: 70
End: 2025-08-08
Payer: MEDICARE

## 2025-08-08 VITALS
HEIGHT: 69 IN | TEMPERATURE: 97.3 F | HEART RATE: 87 BPM | WEIGHT: 282 LBS | BODY MASS INDEX: 41.77 KG/M2 | OXYGEN SATURATION: 96 % | SYSTOLIC BLOOD PRESSURE: 105 MMHG | DIASTOLIC BLOOD PRESSURE: 69 MMHG

## 2025-08-08 VITALS — HEIGHT: 68 IN | BODY MASS INDEX: 42.88 KG/M2

## 2025-08-08 DIAGNOSIS — E11.9 TYPE 2 DIABETES MELLITUS W/OUT COMPLICATIONS: ICD-10-CM

## 2025-08-08 DIAGNOSIS — E66.9 OBESITY, UNSPECIFIED: ICD-10-CM

## 2025-08-08 DIAGNOSIS — K46.9 UNSPECIFIED ABDOMINAL HERNIA W/OUT OBSTRUCTION OR GANGRENE: ICD-10-CM

## 2025-08-08 PROCEDURE — 99214 OFFICE O/P EST MOD 30 MIN: CPT

## 2025-08-08 PROCEDURE — G2211 COMPLEX E/M VISIT ADD ON: CPT

## 2025-08-08 RX ORDER — TIRZEPATIDE 12.5 MG/.5ML
12.5 INJECTION, SOLUTION SUBCUTANEOUS
Qty: 1 | Refills: 2 | Status: ACTIVE | COMMUNITY
Start: 2025-08-08 | End: 1900-01-01

## 2025-08-13 ENCOUNTER — TRANSCRIPTION ENCOUNTER (OUTPATIENT)
Age: 70
End: 2025-08-13

## 2025-08-21 ENCOUNTER — NON-APPOINTMENT (OUTPATIENT)
Age: 70
End: 2025-08-21

## 2025-08-26 ENCOUNTER — NON-APPOINTMENT (OUTPATIENT)
Age: 70
End: 2025-08-26

## 2025-09-05 ENCOUNTER — TRANSCRIPTION ENCOUNTER (OUTPATIENT)
Age: 70
End: 2025-09-05

## 2025-09-08 ENCOUNTER — APPOINTMENT (OUTPATIENT)
Dept: INTERNAL MEDICINE | Facility: CLINIC | Age: 70
End: 2025-09-08
Payer: MEDICARE

## 2025-09-08 VITALS
OXYGEN SATURATION: 98 % | SYSTOLIC BLOOD PRESSURE: 101 MMHG | HEART RATE: 96 BPM | HEIGHT: 68 IN | TEMPERATURE: 96.7 F | DIASTOLIC BLOOD PRESSURE: 71 MMHG | BODY MASS INDEX: 42.13 KG/M2 | WEIGHT: 278 LBS

## 2025-09-08 DIAGNOSIS — E11.9 TYPE 2 DIABETES MELLITUS W/OUT COMPLICATIONS: ICD-10-CM

## 2025-09-08 PROCEDURE — G2211 COMPLEX E/M VISIT ADD ON: CPT

## 2025-09-08 PROCEDURE — 99214 OFFICE O/P EST MOD 30 MIN: CPT | Mod: GC

## 2025-09-17 ENCOUNTER — APPOINTMENT (OUTPATIENT)
Dept: NEPHROLOGY | Facility: CLINIC | Age: 70
End: 2025-09-17
Payer: MEDICARE

## 2025-09-17 VITALS — WEIGHT: 278 LBS | HEIGHT: 68 IN | BODY MASS INDEX: 42.13 KG/M2

## 2025-09-17 DIAGNOSIS — E66.01 MORBID (SEVERE) OBESITY DUE TO EXCESS CALORIES: ICD-10-CM

## 2025-09-17 DIAGNOSIS — E87.20 ACIDOSIS, UNSPECIFIED: ICD-10-CM

## 2025-09-17 DIAGNOSIS — N18.31 CHRONIC KIDNEY DISEASE, STAGE 3A: ICD-10-CM

## 2025-09-17 DIAGNOSIS — I10 ESSENTIAL (PRIMARY) HYPERTENSION: ICD-10-CM

## 2025-09-17 DIAGNOSIS — E87.5 HYPERKALEMIA: ICD-10-CM

## 2025-09-17 PROCEDURE — G2211 COMPLEX E/M VISIT ADD ON: CPT | Mod: 93

## 2025-09-17 PROCEDURE — 99214 OFFICE O/P EST MOD 30 MIN: CPT | Mod: 93

## (undated) DEVICE — KIT CENTURION ANTERIOR

## (undated) DEVICE — PACK ANTERIOR SEGMENT

## (undated) DEVICE — SOL IRR BAG BSS 500ML

## (undated) DEVICE — CAPSULE GUARD I/A

## (undated) DEVICE — NDL HYPO SAFE 25G X 5/8" (ORANGE)

## (undated) DEVICE — GOWN ROYAL SILK XL

## (undated) DEVICE — PREP BETADINE 5% STERILE OPTHALMIC SOLUTION

## (undated) DEVICE — DRAPE MICROSCOPE KNOB COVER SMALL (2 PCS)

## (undated) DEVICE — KNIFE FULL HANDLE ANGLE 2.75MM

## (undated) DEVICE — KNIFE ALCON MVR V-LANCE 20G (WHITE)

## (undated) DEVICE — SUT NYLON 10-0 12" CU-5

## (undated) DEVICE — KNIFE ALCON CRESCENT ANGLED BEVEL UP 2.3MM (PINK)

## (undated) DEVICE — SUT ETHILON 5-0 18" P-3

## (undated) DEVICE — Device

## (undated) DEVICE — PACK CENTURION 2.75MM

## (undated) DEVICE — WARMING BLANKET LOWER ADULT

## (undated) DEVICE — GLV 7.5 PROTEXIS (WHITE)

## (undated) DEVICE — NDL RETROBULBAR VISITEC 25X1.5

## (undated) DEVICE — MARKING PEN W RULER

## (undated) DEVICE — VENODYNE/SCD SLEEVE CALF MEDIUM

## (undated) DEVICE — GLV 8 PROTEXIS (WHITE)

## (undated) DEVICE — TOURNIQUET CUFF 18" DUAL PORT SINGLE BLADDER W PLC  (BLACK)

## (undated) DEVICE — PACK HAND

## (undated) DEVICE — SUT SILK 4-0 18" PS-2